# Patient Record
Sex: MALE | Race: WHITE | NOT HISPANIC OR LATINO | Employment: OTHER | URBAN - METROPOLITAN AREA
[De-identification: names, ages, dates, MRNs, and addresses within clinical notes are randomized per-mention and may not be internally consistent; named-entity substitution may affect disease eponyms.]

---

## 2017-01-12 ENCOUNTER — TELEPHONE (OUTPATIENT)
Dept: ORTHOPEDICS | Facility: CLINIC | Age: 77
End: 2017-01-12

## 2017-01-12 NOTE — TELEPHONE ENCOUNTER
Spoke with patient. Says he wanted to be seen today. Informed him that our providers are in surgery. Patient asked what he should do about his pain today. I informed him that he could go to the ER if he is in a lot of pain.

## 2017-01-12 NOTE — TELEPHONE ENCOUNTER
----- Message from Juice Corrigan sent at 1/12/2017 12:19 PM CST -----  Contact: pt  Pt is calling nurse staff regarding if patient can be seen today if possible. Pt is having pain with the left wrist. Pt call back asap [phone] thanks

## 2017-01-16 ENCOUNTER — OFFICE VISIT (OUTPATIENT)
Dept: ORTHOPEDICS | Facility: CLINIC | Age: 77
End: 2017-01-16
Payer: MEDICARE

## 2017-01-16 VITALS
HEART RATE: 66 BPM | SYSTOLIC BLOOD PRESSURE: 166 MMHG | HEIGHT: 73 IN | WEIGHT: 231.5 LBS | BODY MASS INDEX: 30.68 KG/M2 | DIASTOLIC BLOOD PRESSURE: 86 MMHG

## 2017-01-16 DIAGNOSIS — M19.032 ARTHRITIS OF LEFT WRIST: ICD-10-CM

## 2017-01-16 DIAGNOSIS — M77.8 TENDINITIS OF LEFT WRIST: Primary | ICD-10-CM

## 2017-01-16 PROCEDURE — 1160F RVW MEDS BY RX/DR IN RCRD: CPT | Mod: S$GLB,,, | Performed by: PHYSICIAN ASSISTANT

## 2017-01-16 PROCEDURE — 1159F MED LIST DOCD IN RCRD: CPT | Mod: S$GLB,,, | Performed by: PHYSICIAN ASSISTANT

## 2017-01-16 PROCEDURE — 1157F ADVNC CARE PLAN IN RCRD: CPT | Mod: S$GLB,,, | Performed by: PHYSICIAN ASSISTANT

## 2017-01-16 PROCEDURE — 20600 DRAIN/INJ JOINT/BURSA W/O US: CPT | Mod: LT,S$GLB,, | Performed by: PHYSICIAN ASSISTANT

## 2017-01-16 PROCEDURE — 1125F AMNT PAIN NOTED PAIN PRSNT: CPT | Mod: S$GLB,,, | Performed by: PHYSICIAN ASSISTANT

## 2017-01-16 PROCEDURE — 3079F DIAST BP 80-89 MM HG: CPT | Mod: S$GLB,,, | Performed by: PHYSICIAN ASSISTANT

## 2017-01-16 PROCEDURE — 99999 PR PBB SHADOW E&M-EST. PATIENT-LVL III: CPT | Mod: PBBFAC,,, | Performed by: PHYSICIAN ASSISTANT

## 2017-01-16 PROCEDURE — 99212 OFFICE O/P EST SF 10 MIN: CPT | Mod: 25,S$GLB,, | Performed by: PHYSICIAN ASSISTANT

## 2017-01-16 PROCEDURE — 3077F SYST BP >= 140 MM HG: CPT | Mod: S$GLB,,, | Performed by: PHYSICIAN ASSISTANT

## 2017-01-16 RX ORDER — DICLOFENAC SODIUM 10 MG/G
2 GEL TOPICAL 4 TIMES DAILY
Qty: 1 TUBE | Refills: 3 | Status: SHIPPED | OUTPATIENT
Start: 2017-01-16 | End: 2017-04-27

## 2017-01-16 RX ORDER — METHYLPREDNISOLONE ACETATE 40 MG/ML
40 INJECTION, SUSPENSION INTRA-ARTICULAR; INTRALESIONAL; INTRAMUSCULAR; SOFT TISSUE
Status: COMPLETED | OUTPATIENT
Start: 2017-01-16 | End: 2017-01-16

## 2017-01-16 RX ADMIN — METHYLPREDNISOLONE ACETATE 40 MG: 40 INJECTION, SUSPENSION INTRA-ARTICULAR; INTRALESIONAL; INTRAMUSCULAR; SOFT TISSUE at 03:01

## 2017-01-16 NOTE — MR AVS SNAPSHOT
[facility]  [address]  [place] [address]  Phone: [phone]  Fax: [phone]                  Pablito Givens Jr.   2017 2:45 PM   Office Visit    Description:  Male : 1940   Provider:  Dann Ozuna PA-C   Department:  [facility]           Reason for Visit     Left Wrist - Pain           Diagnoses this Visit        Comments    Tendinitis of left wrist    -  Primary     Arthritis of left wrist                To Do List           Future Appointments        Provider Department Dept Phone    2017 9:20 AM Jacinta Amezcua DPM [facility] Podiatry [phone]    3/20/2017 8:40 AM LABORATORY, [address] [facility] [phone]    3/27/2017 8:20 AM Christina East NP [facility] Internal Medicine [phone]    2017 9:20 AM Christiano Nieto MD [facility] Internal Medicine [phone]    1/3/2018 9:30 AM Josué Cook OD [facility] - Ophthalmology [phone]      Goals (5 Years of Data)     None      Follow-Up and Disposition     Return if symptoms worsen or fail to improve.       These Medications        Disp Refills Start End    diclofenac sodium 1 % Gel 1 Tube 3 2017    Apply 2 g topically 4 (four) times daily. - Topical    Pharmacy: [facility] [address] [place] [address] [id number] [place] [address] Ph #: [phone]         [facility] On [facility]     [facility] -  Assistance  Registered nurses in the [facility] provide clinical advisement, health education, appointment booking, and other advisory services.  Call for this free service at [phone].             Medications           Message regarding Medications     Verify the changes and/or additions to your medication regime listed below are the same as discussed with your clinician today.  If any of these changes or additions are incorrect, please notify your healthcare provider.        START taking these NEW medications      "   Refills    diclofenac sodium 1 % Gel 3    Sig: Apply 2 g topically 4 (four) times daily.    Class: Normal    Route: Topical      These medications were administered today        Dose Freq    methylPREDNISolone acetate injection 40 mg 40 mg Clinic/HOD 1 time    Sig: Inject 1 mL (40 mg total) into the articular space one time.    Class: Normal    Route: Intra-articular           Verify that the below list of medications is an accurate representation of the medications you are currently taking.  If none reported, the list may be blank. If incorrect, please contact your healthcare provider. Carry this list with you in case of emergency.           Current Medications     acetaminophen (TYLENOL) 650 MG TbSR Take 1 tablet (650 mg total) by mouth every 8 (eight) hours.    amitriptyline (ELAVIL) 25 MG tablet Take 1 tablet (25 mg total) by mouth every evening.    aspirin 81 MG Chew Take 81 mg by mouth once daily.    cholecalciferol, vitamin D3, (VITAMIN D3) 1,000 unit capsule Take 1 capsule (1,000 Units total) by mouth once daily.    colchicine 0.6 mg tablet TAKE ONE TABLET BY MOUTH ONCE DAILY    diclofenac sodium 1 % Gel Apply 2 g topically 4 (four) times daily.    hydrochlorothiazide (HYDRODIURIL) 25 MG tablet Take 1 tablet (25 mg total) by mouth once daily.    lancets (ACCU-CHEK SOFTCLIX LANCETS) Misc Check  blood sugars as soon as you wake up and 2 hrs after biggest meal    lisinopril (PRINIVIL,ZESTRIL) 20 MG tablet Take 1 tablet (20 mg total) by mouth once daily.    meloxicam (MOBIC) 15 MG tablet Take 1 tablet (15 mg total) by mouth once daily.    zolpidem (AMBIEN) 5 MG Tab Take 1 tablet (5 mg total) by mouth every evening.           Clinical Reference Information           Vital Signs - Last Recorded  Most recent update: 1/16/2017  3:09 PM by Hayde Wynn MA    BP Pulse Ht Wt BMI    (!) 166/86 66 6' 1" (1.854 m) 105 kg (231 lb 7.7 oz) 30.54 kg/m2      Blood Pressure          Most Recent Value    BP  (!)  " 166/86      Allergies as of 1/16/2017     Amlodipine    Demerol [Meperidine]    Oxycodone    Darvocet A500  [Propoxyphene N-acetaminophen]    Opioids-meperidine And Related    Codeine      Immunizations Administered on Date of Encounter - 1/16/2017     None

## 2017-01-16 NOTE — PROGRESS NOTES
Subjective:      Patient ID: Pablito Givens Jr. is a 76 y.o. male.    Chief Complaint: Pain of the Left Wrist    HPI   The patient presents to clinic for follow-up of left wrist pain. The patient states that he has had a gradual increase in his pain. He had a steroid injection in this wrist several months ago (July 2016) and reports good relief from this. He requests another injection today.     Review of Systems   Constitution: Negative for chills and fever.   Gastrointestinal: Negative for abdominal pain, diarrhea, nausea and vomiting.         Objective:                    Left Hand/Wrist Exam     Pain   Wrist - The patient exhibits pain of the extensory musculature.    Swelling   Wrist - The patient is swollen on the extensory musculature.    Range of Motion     Wrist   Extension: normal   Flexion: normal   Pronation: normal   Supination: normal            On exam, the patient has tenderness and pain of articulating surface of distal radius and ulna and ulna styloid.     X-rays: none taken today.        Assessment:       Encounter Diagnoses   Name Primary?    Tendinitis of left wrist Yes    Arthritis of left wrist           Plan:       Pablito was seen today for pain.    Diagnoses and all orders for this visit:    Tendinitis of left wrist  -     diclofenac sodium 1 % Gel; Apply 2 g topically 4 (four) times daily.    Arthritis of left wrist  -     diclofenac sodium 1 % Gel; Apply 2 g topically 4 (four) times daily.    Other orders  -     methylPREDNISolone acetate injection 40 mg; Inject 1 mL (40 mg total) into the articular space one time.        The patient was given a depomedrol and xylocaine injection for left wrist discomfort. He understands that he can have a max of 4 injections of his wrist per calendar year. This will be his second injection.     He was instructed on the use of warm, damp compresses and taking an anti-inflammatory. The patient does not want to take oral NSAIDs. Therefor, I prescribed  voltaren gel today.     He will follow-up as needed. in the clinic if he has any concerns or increased pain.    Procedure note:    Preoperative diagnosis: Arthritis and tendinitis of left wrist  Postoperative diagnosis: same    Procedure performed Injection  left wrist(s)     Description:  Timeout was performed and the patient's allergies were verified. His left wrist ( distal ulna) was marked and prepped with betadine prior to procedure. Under sterile conditions the patient's left wrist(s) was injected through   anterolateral approach with Depo-Medrol 40 and 0.5 cc of 2% Xylocaine.  The patient tolerated the procedure without incident.  A Band-Aid was applied to the needle site.

## 2017-01-17 ENCOUNTER — TELEPHONE (OUTPATIENT)
Dept: ORTHOPEDICS | Facility: CLINIC | Age: 77
End: 2017-01-17

## 2017-01-17 NOTE — TELEPHONE ENCOUNTER
----- Message from Dina Argueta sent at 1/17/2017 10:29 AM CST -----  Pt at [phone]///states he saw Ms Ozuna yesterday//1-16/17///Was given an topical ointment//the pharmacy did not have this med in stock  and also it is very expensive//$65.00//is needing a different med//less expensive//please call to discuss//thanks/lh

## 2017-01-17 NOTE — TELEPHONE ENCOUNTER
Spoke with patient and let him know that Julian Ozuna said he go go to the store and get a cream called blue-emu for his muscle pain. The patient stated that he will get some and try it.

## 2017-01-17 NOTE — TELEPHONE ENCOUNTER
Spoke with patient and let him know that I have forwarded his message to Dann Ozuna for review and will give him a call back as soon as I hear back from her.

## 2017-01-17 NOTE — TELEPHONE ENCOUNTER
----- Message from Dann Ozuna PA-C sent at 1/17/2017 12:07 PM CST -----  Hi!    If his pharmacy won't cover it, I do not have a cheaper alternative. There are compound creams which can be pretty pricey too.     Please inform the patient of this. He can also purchase Blue-Emu cream at most stores. It's very popular for muscle and joint pain.     Dann Ozuna PA-C    ----- Message -----     From: Lisandra Moran MA     Sent: 1/17/2017  11:28 AM       To: Dann Ozuna PA-C        ----- Message -----     From: Dina Argueta     Sent: 1/17/2017  10:29 AM       To: Laith GONZALEZ Staff    Pt at [phone]///states he saw Ms Ozuna yesterday//1-16/17///Was given an topical ointment//the pharmacy did not have this med in stock  and also it is very expensive//$65.00//is needing a different med//less expensive//please call to discuss//thanks/lh

## 2017-01-23 ENCOUNTER — TELEPHONE (OUTPATIENT)
Dept: INTERNAL MEDICINE | Facility: CLINIC | Age: 77
End: 2017-01-23

## 2017-01-23 RX ORDER — COLCHICINE 0.6 MG/1
0.6 TABLET ORAL DAILY
Qty: 30 TABLET | Refills: 11 | Status: SHIPPED | OUTPATIENT
Start: 2017-01-23 | End: 2017-02-17 | Stop reason: SDUPTHER

## 2017-01-23 NOTE — TELEPHONE ENCOUNTER
S/w insurance. Pt's colchicine is a non formulary medication and a form needs to be completed for pt to have a non Tier 1 exception. Waiting on form.

## 2017-01-23 NOTE — TELEPHONE ENCOUNTER
----- Message from Alexandria Odom sent at 1/23/2017  3:22 PM CST -----  Contact: pt   States he's been taking his medicine for gout / cochison(sp) and has been taking it for years and ws told recently that there was a price increase due to insurance not paying but was told to get a PA to state that pt needs the medicine and can be reached at [phone]/ states his right foot is starting to hurt bad [phone]//thanks/dbw    States they were filled by Dr Oquendo but has been seeing Dr Nieto lately     Pt pharm is   [id number] - [place], [place] - [address] [address]  [address] [place] [address]  [place] [address]  Phone: [phone] Fax: [phone]

## 2017-01-23 NOTE — TELEPHONE ENCOUNTER
MD Gerson Bustillos Staff 3 minutes ago (5:01 PM)                 I tried to send a new order for Colcrys brand name which is [employer] covered formulary and a review should not be required.   Also Dr Oquendo is his PCP. Needs o stick with her for non acute care problems

## 2017-02-13 ENCOUNTER — HOSPITAL ENCOUNTER (OUTPATIENT)
Dept: RADIOLOGY | Facility: HOSPITAL | Age: 77
Discharge: HOME | End: 2017-02-13
Attending: PHYSICIAN ASSISTANT
Payer: MEDICARE

## 2017-02-13 ENCOUNTER — OFFICE VISIT (OUTPATIENT)
Dept: ORTHOPEDICS | Facility: CLINIC | Age: 77
End: 2017-02-13
Payer: MEDICARE

## 2017-02-13 VITALS
HEIGHT: 73 IN | WEIGHT: 231.5 LBS | DIASTOLIC BLOOD PRESSURE: 83 MMHG | BODY MASS INDEX: 30.68 KG/M2 | HEART RATE: 94 BPM | SYSTOLIC BLOOD PRESSURE: 116 MMHG

## 2017-02-13 DIAGNOSIS — M25.421 PAIN AND SWELLING OF ELBOW, RIGHT: ICD-10-CM

## 2017-02-13 DIAGNOSIS — M25.521 RIGHT ELBOW PAIN: ICD-10-CM

## 2017-02-13 DIAGNOSIS — M25.521 PAIN AND SWELLING OF ELBOW, RIGHT: ICD-10-CM

## 2017-02-13 DIAGNOSIS — M10.021 ACUTE IDIOPATHIC GOUT OF RIGHT ELBOW: Primary | ICD-10-CM

## 2017-02-13 DIAGNOSIS — M25.521 RIGHT ELBOW PAIN: Primary | ICD-10-CM

## 2017-02-13 DIAGNOSIS — M19.021 PRIMARY OSTEOARTHRITIS OF RIGHT ELBOW: ICD-10-CM

## 2017-02-13 PROCEDURE — 1159F MED LIST DOCD IN RCRD: CPT | Mod: S$GLB,,, | Performed by: PHYSICIAN ASSISTANT

## 2017-02-13 PROCEDURE — 3074F SYST BP LT 130 MM HG: CPT | Mod: S$GLB,,, | Performed by: PHYSICIAN ASSISTANT

## 2017-02-13 PROCEDURE — 20605 DRAIN/INJ JOINT/BURSA W/O US: CPT | Mod: RT,S$GLB,, | Performed by: PHYSICIAN ASSISTANT

## 2017-02-13 PROCEDURE — 73080 X-RAY EXAM OF ELBOW: CPT | Mod: 26,RT,, | Performed by: RADIOLOGY

## 2017-02-13 PROCEDURE — 99999 PR PBB SHADOW E&M-EST. PATIENT-LVL III: CPT | Mod: PBBFAC,,, | Performed by: PHYSICIAN ASSISTANT

## 2017-02-13 PROCEDURE — 99213 OFFICE O/P EST LOW 20 MIN: CPT | Mod: 25,S$GLB,, | Performed by: PHYSICIAN ASSISTANT

## 2017-02-13 PROCEDURE — 3079F DIAST BP 80-89 MM HG: CPT | Mod: S$GLB,,, | Performed by: PHYSICIAN ASSISTANT

## 2017-02-13 PROCEDURE — 1160F RVW MEDS BY RX/DR IN RCRD: CPT | Mod: S$GLB,,, | Performed by: PHYSICIAN ASSISTANT

## 2017-02-13 PROCEDURE — 1125F AMNT PAIN NOTED PAIN PRSNT: CPT | Mod: S$GLB,,, | Performed by: PHYSICIAN ASSISTANT

## 2017-02-13 PROCEDURE — 1157F ADVNC CARE PLAN IN RCRD: CPT | Mod: S$GLB,,, | Performed by: PHYSICIAN ASSISTANT

## 2017-02-13 RX ORDER — METHYLPREDNISOLONE ACETATE 40 MG/ML
40 INJECTION, SUSPENSION INTRA-ARTICULAR; INTRALESIONAL; INTRAMUSCULAR; SOFT TISSUE
Status: COMPLETED | OUTPATIENT
Start: 2017-02-13 | End: 2017-02-13

## 2017-02-13 RX ADMIN — METHYLPREDNISOLONE ACETATE 40 MG: 40 INJECTION, SUSPENSION INTRA-ARTICULAR; INTRALESIONAL; INTRAMUSCULAR; SOFT TISSUE at 09:02

## 2017-02-13 NOTE — PROGRESS NOTES
Subjective:      Patient ID: Pablito Givens Jr. is a 76 y.o. male.    Chief Complaint: Pain of the Right Elbow    HPI  The patient presents to clinic for evaluation of right elbow pain and swelling.  He states that he did not injure himself or bump it.  The only thing that he's been doing is driving his tractor.  He does have a history of gout.  The patient states that his symptoms have been ongoing for approximately one week now.  Rates his current pain a 10 out of 10.  X-rays were taken today.  The patient states that he hasn't seen a rheumatologist in years.  He is unable to recall who he did see.    Review of Systems   Constitution: Negative for chills and fever.   Gastrointestinal: Negative for abdominal pain, diarrhea, nausea and vomiting.         Objective:            Ortho/SPM Exam   On exam, the patient has mild swelling and soft tissue edema over posterior arm and surrounding elbow.  He has pronounced pain to palpation over lateral and medial epicondyles as well as radial head.  The patient has limited flexion and extension due to his pain.    X-rays:Technique: 3 views were obtained of the right elbow    Comparison: 06/28/2016    Findings: There is nonspecific soft tissue edema surrounding the elbow.  No acute fractures or dislocations visualized.  Prominence of the anterior fat pad suggest possible small elbow joint effusion.  Previously described calcifications near the radial head are no longer visualized Moderate degenerative findings at the elbow again noted and unchanged.        Assessment:       Encounter Diagnoses   Name Primary?    Pain and swelling of elbow, right     Primary osteoarthritis of right elbow     Acute idiopathic gout of right elbow Yes          Plan:       Pablito was seen today for pain.    Diagnoses and all orders for this visit:    Acute idiopathic gout of right elbow    Pain and swelling of elbow, right  -     MARIA E; Future  -     Rheumatoid factor; Future  -     Sedimentation  rate, manual; Future  -     Uric acid; Future  -     C-reactive protein; Future  -     CBC auto differential; Future  -     methylPREDNISolone acetate injection 40 mg; Inject 1 mL (40 mg total) into the articular space one time.  -     methylPREDNISolone acetate injection 40 mg; Inject 1 mL (40 mg total) into the articular space one time.    Primary osteoarthritis of right elbow  -     MARIA E; Future  -     Rheumatoid factor; Future  -     Sedimentation rate, manual; Future  -     Uric acid; Future  -     C-reactive protein; Future  -     CBC auto differential; Future  -     methylPREDNISolone acetate injection 40 mg; Inject 1 mL (40 mg total) into the articular space one time.  -     methylPREDNISolone acetate injection 40 mg; Inject 1 mL (40 mg total) into the articular space one time.      The patient was given 2 injections in the distribution of lateral and medial epicondyles.  Afterwards, I applied a 4 inch Ace wrap on his elbow.  He was cautioned not to bump or lean on this elbow.  I would like the patient to have lab studies on his way out of clinic today.  He was instructed to apply cold compresses to his elbow as needed throughout the day.  The patient will return in approximately 3-4 weeks for follow-up.  If there are any significant abnormalities in his lab studies, I will notify him via phone call in 1-2 days.    Addendum: I notified the patient about his lab results. He will increase his colchicine to BID. He voiced understanding of these instructions.       Procedure note:    Preoperative diagnosis: Osteoarthritis of right elbow, gout flareup, pain and swelling of elbow  Postoperative diagnosis: same    Procedure performed Injection  right elbow(s)     Description:  Timeout was performed and the patient's ALLERGIES were verified.  His right elbow was marked and prepped with Betadine prior to the injections.  Under sterile conditions the patient's right elbow(s) was injected through   anteromedial,  anterolateral approach with Depo-Medrol 40 and 0.5 cc of 2% Xylocaine.  The patient tolerated the procedure without incident.  A Band-Aid was applied to the needle site.

## 2017-02-13 NOTE — MR AVS SNAPSHOT
[facility]  [address] [address]  Phone: [phone]  Fax: [phone]                  Pablito Givens Jr.   2017 9:15 AM   Office Visit    Description:  Male : 1940   Provider:  Dann Ozuna PA-C   [facility]           Reason for Visit     Right Elbow - Pain           Diagnoses this Visit        Comments    Pain and swelling of elbow, right    -  Primary     Primary osteoarthritis of right elbow                To Do List           Future Appointments        Provider Department Dept Phone    2017 2:10 PM LABORATORY, [address] [facility] [phone]    3/13/2017 9:15 AM Dann Ozuna PA-C [facility] [phone]    3/20/2017 8:40 AM LABORATORY, [address] [facility] [phone]    3/27/2017 8:20 AM Christina East NP [facility] Internal Medicine [phone]    2017 9:20 AM Christiano Nieto MD [facility] Internal Medicine [phone]      Goals (5 Years of Data)     None      [facility]     [facility] Nurse Care Line -  Assistance  Registered nurses in the [facility] provide clinical advisement, health education, appointment booking, and other advisory services.  Call for this free service at [phone].             Medications           Message regarding Medications     Verify the changes and/or additions to your medication regime listed below are the same as discussed with your clinician today.  If any of these changes or additions are incorrect, please notify your healthcare provider.        These medications were administered today        Dose Freq    methylPREDNISolone acetate injection 40 mg 40 mg Clinic/HOD 1 time    Sig: Inject 1 mL (40 mg total) into the articular space one time.    Class: Normal    Route: Intra-articular    methylPREDNISolone acetate injection 40 mg 40 mg Clinic/HOD 1 time    Sig: Inject 1 mL (40 mg  "total) into the articular space one time.    Class: Normal    Route: Intra-articular           Verify that the below list of medications is an accurate representation of the medications you are currently taking.  If none reported, the list may be blank. If incorrect, please contact your healthcare provider. Carry this list with you in case of emergency.           Current Medications     acetaminophen (TYLENOL) 650 MG TbSR Take 1 tablet (650 mg total) by mouth every 8 (eight) hours.    amitriptyline (ELAVIL) 25 MG tablet Take 1 tablet (25 mg total) by mouth every evening.    aspirin 81 MG Chew Take 81 mg by mouth once daily.    cholecalciferol, vitamin D3, (VITAMIN D3) 1,000 unit capsule Take 1 capsule (1,000 Units total) by mouth once daily.    colchicine 0.6 mg tablet Take 1 tablet (0.6 mg total) by mouth once daily.    hydrochlorothiazide (HYDRODIURIL) 25 MG tablet Take 1 tablet (25 mg total) by mouth once daily.    lancets (ACCU-CHEK SOFTCLIX LANCETS) Misc Check  blood sugars as soon as you wake up and 2 hrs after biggest meal    lisinopril (PRINIVIL,ZESTRIL) 20 MG tablet Take 1 tablet (20 mg total) by mouth once daily.    meloxicam (MOBIC) 15 MG tablet Take 1 tablet (15 mg total) by mouth once daily.    zolpidem (AMBIEN) 5 MG Tab Take 1 tablet (5 mg total) by mouth every evening.    diclofenac sodium 1 % Gel Apply 2 g topically 4 (four) times daily.           Clinical Reference Information           Your Vitals Were     BP Pulse Height Weight BMI    116/83 94 6' 1" (1.854 m) 105 kg (231 lb 7.7 oz) 30.54 kg/m2      Blood Pressure          Most Recent Value    BP  116/83      Allergies as of 2/13/2017     Amlodipine    Demerol [Meperidine]    Oxycodone    Darvocet A500  [Propoxyphene N-acetaminophen]    Opioids-meperidine And Related    Codeine      Immunizations Administered on Date of Encounter - 2/13/2017     None      Orders Placed During Today's Visit     Future Labs/Procedures Expected by Expires    MARIA E  " 2/13/2017 4/14/2018    C-reactive protein  2/13/2017 4/14/2018    CBC auto differential  2/13/2017 4/14/2018    Rheumatoid factor  2/13/2017 4/14/2018    Sedimentation rate, manual  2/13/2017 4/14/2018    Uric acid  2/13/2017 4/14/2018      Language Assistance Services     ATTENTION: Language assistance services are available, free of charge. Please call [phone].      ATENCIÓN: Si habla español, tiene a han disposición servicios gratuitos de asistencia lingüística. Llame al [phone].     CHÚ Ý: N?u b?n nói Ti?ng Vi?t, có các d?ch v? h? tr? ngôn ng? mi?n phí dành cho b?n. G?i s? [phone].         [facility] complies with applicable Federal civil rights laws and does not discriminate on the basis of race, color, national origin, age, disability, or sex.

## 2017-02-15 ENCOUNTER — TELEPHONE (OUTPATIENT)
Dept: ORTHOPEDICS | Facility: CLINIC | Age: 77
End: 2017-02-15

## 2017-02-15 NOTE — TELEPHONE ENCOUNTER
----- Message from Soy Horton sent at 2/15/2017  9:41 AM CST -----  Pt is requesting a call from nurse to discuss his prescription.              Please call pt back at [phone]

## 2017-02-17 ENCOUNTER — TELEPHONE (OUTPATIENT)
Dept: ORTHOPEDICS | Facility: CLINIC | Age: 77
End: 2017-02-17

## 2017-02-17 RX ORDER — COLCHICINE 0.6 MG/1
0.6 TABLET ORAL 2 TIMES DAILY
Qty: 60 TABLET | Refills: 1 | Status: SHIPPED | OUTPATIENT
Start: 2017-02-17 | End: 2017-02-17 | Stop reason: SDUPTHER

## 2017-02-17 RX ORDER — COLCHICINE 0.6 MG/1
0.6 TABLET ORAL 2 TIMES DAILY
Qty: 60 TABLET | Refills: 1 | Status: SHIPPED | OUTPATIENT
Start: 2017-02-17 | End: 2017-04-27

## 2017-02-17 NOTE — TELEPHONE ENCOUNTER
Spoke with patient regarding his pharmacy not having his medication in stock.I got a new pharmacy and let him know that [facility] will resend it .

## 2017-02-17 NOTE — TELEPHONE ENCOUNTER
----- Message from Dann Ozuna PA-C sent at 2/17/2017  1:29 PM CST -----  Contact: Pt   Can you please call the patient and let him know that I prescribed his medication?    Dann Yeung       ----- Message -----     From: Lisandra Moran MA     Sent: 2/16/2017   2:05 PM       To: Dann Ozuna PA-C     Just a reminder to fill medication. Thanks.   ----- Message -----     From: Mariel Mathew     Sent: 2/16/2017   1:50 PM       To: Laith GONZALEZ Staff    Pt states he spoke with the nurse last night and is waiting on a call from the nurse./ Pt can be reached at [phone]

## 2017-03-10 ENCOUNTER — PATIENT OUTREACH (OUTPATIENT)
Dept: ADMINISTRATIVE | Facility: HOSPITAL | Age: 77
End: 2017-03-10

## 2017-03-10 NOTE — PROGRESS NOTES
Diabetic eye exam has been completed and [facility] to [facility] for [facility]-Eye Exam. Measure has been satisfied

## 2017-03-13 ENCOUNTER — OFFICE VISIT (OUTPATIENT)
Dept: ORTHOPEDICS | Facility: CLINIC | Age: 77
End: 2017-03-13
Payer: MEDICARE

## 2017-03-13 VITALS
SYSTOLIC BLOOD PRESSURE: 113 MMHG | DIASTOLIC BLOOD PRESSURE: 72 MMHG | HEIGHT: 73 IN | HEART RATE: 89 BPM | BODY MASS INDEX: 30.51 KG/M2 | WEIGHT: 230.19 LBS

## 2017-03-13 DIAGNOSIS — M77.11 RIGHT TENNIS ELBOW: Primary | ICD-10-CM

## 2017-03-13 DIAGNOSIS — M10.021 ACUTE IDIOPATHIC GOUT OF RIGHT ELBOW: ICD-10-CM

## 2017-03-13 DIAGNOSIS — M19.021 PRIMARY OSTEOARTHRITIS OF RIGHT ELBOW: ICD-10-CM

## 2017-03-13 PROCEDURE — 1160F RVW MEDS BY RX/DR IN RCRD: CPT | Mod: S$GLB,,, | Performed by: PHYSICIAN ASSISTANT

## 2017-03-13 PROCEDURE — 3078F DIAST BP <80 MM HG: CPT | Mod: S$GLB,,, | Performed by: PHYSICIAN ASSISTANT

## 2017-03-13 PROCEDURE — 99212 OFFICE O/P EST SF 10 MIN: CPT | Mod: S$GLB,,, | Performed by: PHYSICIAN ASSISTANT

## 2017-03-13 PROCEDURE — 99999 PR PBB SHADOW E&M-EST. PATIENT-LVL III: CPT | Mod: PBBFAC,,, | Performed by: PHYSICIAN ASSISTANT

## 2017-03-13 PROCEDURE — 1157F ADVNC CARE PLAN IN RCRD: CPT | Mod: S$GLB,,, | Performed by: PHYSICIAN ASSISTANT

## 2017-03-13 PROCEDURE — 1125F AMNT PAIN NOTED PAIN PRSNT: CPT | Mod: S$GLB,,, | Performed by: PHYSICIAN ASSISTANT

## 2017-03-13 PROCEDURE — 3074F SYST BP LT 130 MM HG: CPT | Mod: S$GLB,,, | Performed by: PHYSICIAN ASSISTANT

## 2017-03-13 PROCEDURE — 1159F MED LIST DOCD IN RCRD: CPT | Mod: S$GLB,,, | Performed by: PHYSICIAN ASSISTANT

## 2017-03-13 NOTE — MR AVS SNAPSHOT
[facility]  [address] [facility] [address]  Phone: [phone]  Fax: [phone]                  Pablito Givens Jr.   3/13/2017 9:15 AM   Office Visit    Description:  Male : 1940   Provider:  Dann Ozuna PA-C   Department:  [facility]           Reason for Visit     Right Elbow - Pain           Diagnoses this Visit        Comments    Right tennis elbow    -  Primary     Acute idiopathic gout of right elbow         Primary osteoarthritis of right elbow                To Do List           Future Appointments        Provider Department Dept Phone    3/20/2017 8:40 AM LABORATORY, [address] [facility] [phone]    2017 10:30 AM Larry Zepeda MD [facility] - Rheumatology [phone]    2017 9:20 AM Christiano Nieto MD [facility] - Internal Medicine [phone]    1/3/2018 9:30 AM Josué Cook OD [facility] Ophthalmology [phone]      Goals (5 Years of Data)     None      [facility]     [facility] -  Assistance  Registered nurses in the [facility] provide clinical advisement, health education, appointment booking, and other advisory services.  Call for this free service at [phone].             Medications           Message regarding Medications     Verify the changes and/or additions to your medication regime listed below are the same as discussed with your clinician today.  If any of these changes or additions are incorrect, please notify your healthcare provider.             Verify that the below list of medications is an accurate representation of the medications you are currently taking.  If none reported, the list may be blank. If incorrect, please contact your healthcare provider. Carry this list with you in case of emergency.           Current Medications     acetaminophen (TYLENOL) 650 MG TbSR Take 1 tablet (650 mg total) by mouth every 8 (eight) hours.     "amitriptyline (ELAVIL) 25 MG tablet Take 1 tablet (25 mg total) by mouth every evening.    aspirin 81 MG Chew Take 81 mg by mouth once daily.    cholecalciferol, vitamin D3, (VITAMIN D3) 1,000 unit capsule Take 1 capsule (1,000 Units total) by mouth once daily.    colchicine 0.6 mg tablet Take 1 tablet (0.6 mg total) by mouth 2 (two) times daily.    hydrochlorothiazide (HYDRODIURIL) 25 MG tablet Take 1 tablet (25 mg total) by mouth once daily.    lancets (ACCU-CHEK SOFTCLIX LANCETS) Misc Check  blood sugars as soon as you wake up and 2 hrs after biggest meal    lisinopril (PRINIVIL,ZESTRIL) 20 MG tablet Take 1 tablet (20 mg total) by mouth once daily.    meloxicam (MOBIC) 15 MG tablet Take 1 tablet (15 mg total) by mouth once daily.    zolpidem (AMBIEN) 5 MG Tab Take 1 tablet (5 mg total) by mouth every evening.    diclofenac sodium 1 % Gel Apply 2 g topically 4 (four) times daily.           Clinical Reference Information           Your Vitals Were     BP Pulse Height Weight BMI    113/72 89 6' 1" (1.854 m) 104.4 kg (230 lb 2.6 oz) 30.37 kg/m2      Blood Pressure          Most Recent Value    BP  113/72      Allergies as of 3/13/2017     Amlodipine    Demerol [Meperidine]    Oxycodone    Darvocet A500  [Propoxyphene N-acetaminophen]    Opioids-meperidine And Related    Codeine      Immunizations Administered on Date of Encounter - 3/13/2017     None      Orders Placed During Today's Visit      Normal Orders This Visit    Ambulatory Referral to Rheumatology       Language Assistance Services     ATTENTION: Language assistance services are available, free of charge. Please call [phone].      ATENCIÓN: Si habla caesar, tiene a han disposición servicios gratuitos de asistencia lingüística. Llame al [phone].     CHÚ Ý: N?u b?n nói Ti?ng Vi?t, có các d?ch v? h? tr? ngôn ng? mi?n phí dành cho b?n. G?i s? [phone].         [facility] complies with applicable Federal civil rights laws and does " not discriminate on the basis of race, color, national origin, age, disability, or sex.

## 2017-03-13 NOTE — PROGRESS NOTES
Subjective:      Patient ID: Pablito Givens Jr. is a 76 y.o. male.    Chief Complaint: Pain of the Right Elbow    HPI  The patient presents to clinic for follow-up of right elbow pain and swelling. Overall, his symptoms have improved greatly since his last visit. The patient states that he was unable to get colcrys BID from his pharmacy due to his insurance and out-of-pocket costs. He started taking the colcrys that he had at home BID for a few days (3-4 days) and then resumed it once daily.     Labs from his last visit were reviewed with the patient.     Review of Systems   Constitution: Negative for chills and fever.   Gastrointestinal: Negative for abdominal pain, diarrhea, nausea and vomiting.         Objective:                        Right Hand/Wrist Exam     Range of Motion   The patient has normal right hand/wrist ROM.      Right Elbow Exam     Inspection   Effusion: absent    Pain   The patient exhibits pain of the extensor musculature and lateral epicondyle    Swelling   The patient is swollen on the flexor pronator musculature and lateral epicondyle    Tenderness   The patient is tender to palpation of the lateral epicondyle.     Range of Motion   The patient has normal right elbow ROM.    Tests   Tennis Elbow: mild            X-rays:  None taken today.    Labs:       Normal Range:    Current:   Uric Acid 3.4 - 7.0 mg/dL 10.4 (H             Assessment:       Encounter Diagnoses   Name Primary?    Right tennis elbow Yes    Acute idiopathic gout of right elbow     Primary osteoarthritis of right elbow           Plan:       Pablito was seen today for pain.    Diagnoses and all orders for this visit:    Right tennis elbow  -     Ambulatory Referral to Rheumatology    Acute idiopathic gout of right elbow  -     Ambulatory Referral to Rheumatology    Primary osteoarthritis of right elbow  -     Ambulatory Referral to Rheumatology      The patient seems to be improving with his pain and swelling after a recent  Depo-Medrol and Xylocaine injection of his elbow and with colchicine/colcrys.  I would like the patient to have an appointment with rheumatology for a consultation given  recent elevation of uric acid levels.  The patient would like to follow up with me after that  appointment should he have any increased pain, swelling or other concerns.

## 2017-03-20 ENCOUNTER — LAB VISIT (OUTPATIENT)
Dept: LAB | Facility: HOSPITAL | Age: 77
End: 2017-03-20
Attending: FAMILY MEDICINE
Payer: MEDICARE

## 2017-03-20 DIAGNOSIS — E55.9 VITAMIN D DEFICIENCY: ICD-10-CM

## 2017-03-20 DIAGNOSIS — I10 ESSENTIAL HYPERTENSION: ICD-10-CM

## 2017-03-20 DIAGNOSIS — R73.03 PREDIABETES: ICD-10-CM

## 2017-03-20 LAB
25(OH)D3+25(OH)D2 SERPL-MCNC: 21 NG/ML
ANION GAP SERPL CALC-SCNC: 13 MMOL/L
BUN SERPL-MCNC: 26 MG/DL
CALCIUM SERPL-MCNC: 9.9 MG/DL
CHLORIDE SERPL-SCNC: 104 MMOL/L
CHOLEST/HDLC SERPL: 4.5 {RATIO}
CO2 SERPL-SCNC: 26 MMOL/L
CREAT SERPL-MCNC: 1.5 MG/DL
EST. GFR  (AFRICAN AMERICAN): 52.3 ML/MIN/1.73 M^2
EST. GFR  (NON AFRICAN AMERICAN): 45.2 ML/MIN/1.73 M^2
GLUCOSE SERPL-MCNC: 85 MG/DL
HDL/CHOLESTEROL RATIO: 22.1 %
HDLC SERPL-MCNC: 163 MG/DL
HDLC SERPL-MCNC: 36 MG/DL
LDLC SERPL CALC-MCNC: 98.8 MG/DL
NONHDLC SERPL-MCNC: 127 MG/DL
POTASSIUM SERPL-SCNC: 4 MMOL/L
SODIUM SERPL-SCNC: 143 MMOL/L
TRIGL SERPL-MCNC: 141 MG/DL
TSH SERPL DL<=0.005 MIU/L-ACNC: 1.61 UIU/ML

## 2017-03-20 PROCEDURE — 80061 LIPID PANEL: CPT

## 2017-03-20 PROCEDURE — 82306 VITAMIN D 25 HYDROXY: CPT

## 2017-03-20 PROCEDURE — 84443 ASSAY THYROID STIM HORMONE: CPT

## 2017-03-20 PROCEDURE — 83036 HEMOGLOBIN GLYCOSYLATED A1C: CPT

## 2017-03-20 PROCEDURE — 80048 BASIC METABOLIC PNL TOTAL CA: CPT

## 2017-03-20 PROCEDURE — 36415 COLL VENOUS BLD VENIPUNCTURE: CPT

## 2017-03-21 LAB
ESTIMATED AVG GLUCOSE: 128 MG/DL
HBA1C MFR BLD HPLC: 6.1 %

## 2017-03-24 ENCOUNTER — TELEPHONE (OUTPATIENT)
Dept: INTERNAL MEDICINE | Facility: CLINIC | Age: 77
End: 2017-03-24

## 2017-03-24 DIAGNOSIS — E55.9 VITAMIN D INSUFFICIENCY: Primary | ICD-10-CM

## 2017-03-24 RX ORDER — ERGOCALCIFEROL 1.25 MG/1
50000 CAPSULE ORAL
Qty: 12 CAPSULE | Refills: 0 | Status: SHIPPED | OUTPATIENT
Start: 2017-03-24 | End: 2017-06-08 | Stop reason: SDUPTHER

## 2017-03-24 NOTE — TELEPHONE ENCOUNTER
----- Message from Christiano Nieto MD sent at 3/22/2017 11:36 AM CDT -----  A1c remains in prediabetic range.  As long as maintains physical activity level, okay to stay off low-dose metformin.  If decreased physical activity level, needs to start low dose metformin.  Return to clinic as planned.

## 2017-03-24 NOTE — TELEPHONE ENCOUNTER
----- Message from Christiano Nieto MD sent at 3/21/2017  8:12 AM CDT -----  Thyroid testing is normal.  Vitamin D Level is low despite OTC supplement. Needs weekly high dose supplement for 3 months. Recheck levlel in 3 months.  Lipid panel stable  Renal disease stable. Keep all Urology and nephrology appointments.  A1c pending

## 2017-03-28 DIAGNOSIS — F51.04 CHRONIC INSOMNIA: ICD-10-CM

## 2017-03-28 RX ORDER — ZOLPIDEM TARTRATE 5 MG/1
TABLET ORAL
Qty: 90 TABLET | Refills: 0 | Status: SHIPPED | OUTPATIENT
Start: 2017-03-28 | End: 2017-06-19 | Stop reason: SDUPTHER

## 2017-04-27 ENCOUNTER — OFFICE VISIT (OUTPATIENT)
Dept: INTERNAL MEDICINE | Facility: CLINIC | Age: 77
End: 2017-04-27
Payer: MEDICARE

## 2017-04-27 ENCOUNTER — NURSE TRIAGE (OUTPATIENT)
Dept: ADMINISTRATIVE | Facility: CLINIC | Age: 77
End: 2017-04-27

## 2017-04-27 VITALS
BODY MASS INDEX: 30.33 KG/M2 | TEMPERATURE: 98 F | SYSTOLIC BLOOD PRESSURE: 122 MMHG | HEIGHT: 73 IN | DIASTOLIC BLOOD PRESSURE: 72 MMHG | WEIGHT: 228.81 LBS

## 2017-04-27 DIAGNOSIS — Z90.5 HISTORY OF NEPHRECTOMY, UNILATERAL: ICD-10-CM

## 2017-04-27 DIAGNOSIS — N18.30 STAGE 3 CHRONIC KIDNEY DISEASE: ICD-10-CM

## 2017-04-27 DIAGNOSIS — R73.03 PREDIABETES: Chronic | ICD-10-CM

## 2017-04-27 DIAGNOSIS — E55.9 VITAMIN D DEFICIENCY DISEASE: Chronic | ICD-10-CM

## 2017-04-27 DIAGNOSIS — M1A.09X1 IDIOPATHIC CHRONIC GOUT OF MULTIPLE SITES WITH TOPHUS: Primary | Chronic | ICD-10-CM

## 2017-04-27 PROCEDURE — 1157F ADVNC CARE PLAN IN RCRD: CPT | Mod: S$GLB,,, | Performed by: FAMILY MEDICINE

## 2017-04-27 PROCEDURE — 1125F AMNT PAIN NOTED PAIN PRSNT: CPT | Mod: S$GLB,,, | Performed by: FAMILY MEDICINE

## 2017-04-27 PROCEDURE — 99999 PR PBB SHADOW E&M-EST. PATIENT-LVL III: CPT | Mod: PBBFAC,,, | Performed by: FAMILY MEDICINE

## 2017-04-27 PROCEDURE — 3074F SYST BP LT 130 MM HG: CPT | Mod: S$GLB,,, | Performed by: FAMILY MEDICINE

## 2017-04-27 PROCEDURE — 3078F DIAST BP <80 MM HG: CPT | Mod: S$GLB,,, | Performed by: FAMILY MEDICINE

## 2017-04-27 PROCEDURE — 99214 OFFICE O/P EST MOD 30 MIN: CPT | Mod: S$GLB,,, | Performed by: FAMILY MEDICINE

## 2017-04-27 PROCEDURE — 99499 UNLISTED E&M SERVICE: CPT | Mod: S$GLB,,, | Performed by: FAMILY MEDICINE

## 2017-04-27 PROCEDURE — 1160F RVW MEDS BY RX/DR IN RCRD: CPT | Mod: S$GLB,,, | Performed by: FAMILY MEDICINE

## 2017-04-27 PROCEDURE — 1159F MED LIST DOCD IN RCRD: CPT | Mod: S$GLB,,, | Performed by: FAMILY MEDICINE

## 2017-04-27 RX ORDER — PREDNISONE 50 MG/1
50 TABLET ORAL DAILY
Qty: 7 TABLET | Refills: 0 | Status: SHIPPED | OUTPATIENT
Start: 2017-04-27 | End: 2017-05-04

## 2017-04-27 NOTE — TELEPHONE ENCOUNTER
Reason for Disposition   SEVERE pain (e.g., excruciating, unable to do any normal activities)    Protocols used:  FOOT PAIN-A-OH    Pablito is calling to request an appointment.  He has left foot pain and swelling.  States he is scheduled to see a Rheumatologist but not until June and can not wait that long. States took tatianna dose of ASA last night but is still hurting.  Appointment scheduled.

## 2017-04-27 NOTE — PROGRESS NOTES
Subjective:   Patient ID: Pablito Givens Jr. is a 77 y.o. male.  Chief Complaint:  Foot Pain    HPI Comments: Patient presents for second opinion regarding left foot pain and chronic recurrent gout.  Has appointment with rheumatology June 2.  Review of chart shows previous evaluation and diagnosis of gout by rheumatology with allopurinol preventative and colchicine one needed   Unfortunately history kidney cancer with nephrectomy and most recent EGFR 45.2 with creatinine 1.5. Nephrology advised no allopurinol or cold to seen use   Multiple frequent attacks since then.    Prediabetes with A1c 6.1 recently.    Vitamin D on weekly high dose supplementation.      Review of Systems   Constitutional: Negative for chills, fatigue and fever.   Eyes: Negative for visual disturbance.   Respiratory: Negative for cough, chest tightness, shortness of breath and wheezing.    Cardiovascular: Negative for chest pain, palpitations and leg swelling.   Gastrointestinal: Negative for abdominal pain, constipation, diarrhea, nausea and vomiting.   Endocrine: Negative for polydipsia, polyphagia and polyuria.   Musculoskeletal: Positive for arthralgias, gait problem and joint swelling. Negative for myalgias.   Skin: Negative for rash.   Neurological: Negative for dizziness, syncope, weakness, numbness and headaches.       Current Outpatient Prescriptions:     acetaminophen (TYLENOL) 650 MG TbSR, Take 1 tablet (650 mg total) by mouth every 8 (eight) hours., Disp: , Rfl: 0    aspirin 81 MG Chew, Take 81 mg by mouth once daily., Disp: , Rfl:     ergocalciferol (ERGOCALCIFEROL) 50,000 unit Cap, Take 1 capsule (50,000 Units total) by mouth every 7 days., Disp: 12 capsule, Rfl: 0    hydrochlorothiazide (HYDRODIURIL) 25 MG tablet, Take 1 tablet (25 mg total) by mouth once daily., Disp: 90 tablet, Rfl: 3    lisinopril (PRINIVIL,ZESTRIL) 20 MG tablet, Take 1 tablet (20 mg total) by mouth once daily., Disp: 90 tablet, Rfl: 3    zolpidem  "(AMBIEN) 5 MG Tab, TAKE ONE TABLET BY MOUTH IN THE EVENING, Disp: 90 tablet, Rfl: 0    predniSONE (DELTASONE) 50 MG Tab, Take 1 tablet (50 mg total) by mouth once daily., Disp: 7 tablet, Rfl: 0    Objective:   /72 (BP Location: Right arm, Patient Position: Sitting, BP Method: Manual)  Temp 97.8 °F (36.6 °C) (Tympanic)   Ht 6' 1" (1.854 m)  Wt 103.8 kg (228 lb 13.4 oz)  BMI 30.19 kg/m2    Physical Exam   Constitutional: He appears well-developed and well-nourished.   Eyes: Conjunctivae are normal. Right eye exhibits no discharge. Left eye exhibits no discharge. No scleral icterus.   Neck: No JVD present.   Cardiovascular: Normal rate, regular rhythm and normal heart sounds.  Exam reveals no gallop and no friction rub.    No murmur heard.  Pulmonary/Chest: Effort normal and breath sounds normal. He has no wheezes. He has no rhonchi. He has no rales.   Abdominal: Soft. He exhibits no distension. There is no tenderness. There is no rebound and no guarding.   Musculoskeletal: He exhibits no edema.   Skin: Skin is warm and dry. No rash noted.   Psychiatric: He has a normal mood and affect.   Nursing note and vitals reviewed.    Assessment:     1. Idiopathic chronic gout of multiple sites with tophus    2. Stage 3 chronic kidney disease    3. Vitamin D deficiency disease    4. Prediabetes    5. History of nephrectomy, unilateral      Plan:   Idiopathic chronic gout of multiple sites with tophus  -     predniSONE (DELTASONE) 50 MG Tab; Take 1 tablet (50 mg total) by mouth once daily.  Dispense: 7 tablet; Refill: 0  Treat present acute attack with prednisone.  Tylenol over-the-counter.  No colchicine.  No over-the-counter NSAIDs.  Keep rheumatology appointment regarding maintenance preventative medicine of allopurinol despite kidney risk or possible daily low-dose prednisone.    Stage 3 chronic kidney disease/History of nephrectomy, unilateral  Patient refuses/declines nephrology referral today.      Vitamin D " deficiency disease  Continue weekly high-dose supplement.  Return to clinic 2 months as planned.  Recheck levels in.    Prediabetes  Stable.  Recheck A1c next visit.     RTC 2 months

## 2017-04-27 NOTE — MR AVS SNAPSHOT
[facility]  [address] [facility] [address] [facility] [place] [address]  Phone: [phone]  Fax: [phone]                  Pablito Givens Jr.   2017 1:20 PM   Office Visit    Description:  Male : 1940   Provider:  Christiano Nieto MD   Department:  [facility] - Internal Medicine           Reason for Visit     Foot Pain           Diagnoses this Visit        Comments    Idiopathic chronic gout of multiple sites with tophus    -  Primary     Stage 3 chronic kidney disease         Vitamin D deficiency disease         Prediabetes         History of nephrectomy, unilateral                To Do List           Future Appointments        Provider Department Dept Phone    2017 10:30 AM Larry Zepeda MD [facility] Rheumatology [phone]    2017 9:20 AM Christiano Nieto MD [facility] Internal Medicine [phone]    1/3/2018 9:30 AM Josué Cook OD [facility] [phone]      Goals (5 Years of Data)     None      Follow-Up and Disposition     Return for As Planned with Dr Nieto, As Planned with Rhematologist.       These Medications        Disp Refills Start End    predniSONE (DELTASONE) 50 MG Tab 7 tablet 0 2017    Take 1 tablet (50 mg total) by mouth once daily. - Oral    Pharmacy: [facility] [id number]  [place], [place] - [address] [facility] #: [phone]         [facility]     [facility] -  Assistance  Unless otherwise directed by your provider, please contact [facility], our nurse care line that is available for  assistance.     Registered nurses in the [facility] provide: appointment scheduling, clinical advisement, health education, and other advisory services.  Call: [phone] (toll free)               Medications           Message regarding Medications     Verify the changes and/or additions to your medication regime listed below are the same as discussed with your clinician today.  If any of  "these changes or additions are incorrect, please notify your healthcare provider.        START taking these NEW medications        Refills    predniSONE (DELTASONE) 50 MG Tab 0    Sig: Take 1 tablet (50 mg total) by mouth once daily.    Class: Normal    Route: Oral      STOP taking these medications     amitriptyline (ELAVIL) 25 MG tablet Take 1 tablet (25 mg total) by mouth every evening.    diclofenac sodium 1 % Gel Apply 2 g topically 4 (four) times daily.    meloxicam (MOBIC) 15 MG tablet Take 1 tablet (15 mg total) by mouth once daily.    colchicine 0.6 mg tablet Take 1 tablet (0.6 mg total) by mouth 2 (two) times daily.    lancets (ACCU-CHEK SOFTCLIX LANCETS) Misc Check  blood sugars as soon as you wake up and 2 hrs after biggest meal           Verify that the below list of medications is an accurate representation of the medications you are currently taking.  If none reported, the list may be blank. If incorrect, please contact your healthcare provider. Carry this list with you in case of emergency.           Current Medications     acetaminophen (TYLENOL) 650 MG TbSR Take 1 tablet (650 mg total) by mouth every 8 (eight) hours.    aspirin 81 MG Chew Take 81 mg by mouth once daily.    ergocalciferol (ERGOCALCIFEROL) 50,000 unit Cap Take 1 capsule (50,000 Units total) by mouth every 7 days.    hydrochlorothiazide (HYDRODIURIL) 25 MG tablet Take 1 tablet (25 mg total) by mouth once daily.    lisinopril (PRINIVIL,ZESTRIL) 20 MG tablet Take 1 tablet (20 mg total) by mouth once daily.    predniSONE (DELTASONE) 50 MG Tab Take 1 tablet (50 mg total) by mouth once daily.    zolpidem (AMBIEN) 5 MG Tab TAKE ONE TABLET BY MOUTH IN THE EVENING           Clinical Reference Information           Your Vitals Were     BP Temp Height Weight BMI    122/72 (BP Location: Right arm, Patient Position: Sitting, BP Method: Manual) 97.8 °F (36.6 °C) (Tympanic) 6' 1" (1.854 m) 103.8 kg (228 lb 13.4 oz) 30.19 kg/m2      Blood Pressure  "         Most Recent Value    BP  122/72      Allergies as of 4/27/2017     Amlodipine    Demerol [Meperidine]    Oxycodone    Darvocet A500  [Propoxyphene N-acetaminophen]    Opioids-meperidine And Related    Codeine      Immunizations Administered on Date of Encounter - 4/27/2017     None      Language Assistance Services     ATTENTION: Language assistance services are available, free of charge. Please call [phone].      ATENCIÓN: Si habla español, tiene a han disposición servicios gratuitos de asistencia lingüística. Llame al [phone].     [facility] Ý: N?u b?n nói Ti?ng Vi?t, có các d?ch v? h? tr? ngôn ng? mi?n phí dành cho b?n. G?i s? [phone].         [facility] complies with applicable Federal civil rights laws and does not discriminate on the basis of race, color, national origin, age, disability, or sex.

## 2017-05-07 ENCOUNTER — PATIENT MESSAGE (OUTPATIENT)
Dept: INTERNAL MEDICINE | Facility: CLINIC | Age: 77
End: 2017-05-07

## 2017-05-08 ENCOUNTER — TELEPHONE (OUTPATIENT)
Dept: RHEUMATOLOGY | Facility: CLINIC | Age: 77
End: 2017-05-08

## 2017-05-08 ENCOUNTER — OFFICE VISIT (OUTPATIENT)
Dept: INTERNAL MEDICINE | Facility: CLINIC | Age: 77
End: 2017-05-08
Payer: MEDICARE

## 2017-05-08 ENCOUNTER — TELEPHONE (OUTPATIENT)
Dept: INTERNAL MEDICINE | Facility: CLINIC | Age: 77
End: 2017-05-08

## 2017-05-08 ENCOUNTER — HOSPITAL ENCOUNTER (OUTPATIENT)
Dept: RADIOLOGY | Facility: HOSPITAL | Age: 77
Discharge: HOME | End: 2017-05-08
Attending: FAMILY MEDICINE
Payer: MEDICARE

## 2017-05-08 ENCOUNTER — PATIENT MESSAGE (OUTPATIENT)
Dept: RHEUMATOLOGY | Facility: CLINIC | Age: 77
End: 2017-05-08

## 2017-05-08 ENCOUNTER — PATIENT MESSAGE (OUTPATIENT)
Dept: INTERNAL MEDICINE | Facility: CLINIC | Age: 77
End: 2017-05-08

## 2017-05-08 VITALS
HEIGHT: 73 IN | HEART RATE: 98 BPM | SYSTOLIC BLOOD PRESSURE: 110 MMHG | TEMPERATURE: 101 F | OXYGEN SATURATION: 98 % | DIASTOLIC BLOOD PRESSURE: 80 MMHG

## 2017-05-08 DIAGNOSIS — M1A.09X1 IDIOPATHIC CHRONIC GOUT OF MULTIPLE SITES WITH TOPHUS: Chronic | ICD-10-CM

## 2017-05-08 DIAGNOSIS — Z85.528 HISTORY OF RENAL CELL CARCINOMA: ICD-10-CM

## 2017-05-08 DIAGNOSIS — E55.9 VITAMIN D DEFICIENCY DISEASE: Chronic | ICD-10-CM

## 2017-05-08 DIAGNOSIS — Z90.5 HISTORY OF NEPHRECTOMY, UNILATERAL: ICD-10-CM

## 2017-05-08 DIAGNOSIS — M25.571 ACUTE RIGHT ANKLE PAIN: Primary | ICD-10-CM

## 2017-05-08 DIAGNOSIS — M25.571 ACUTE RIGHT ANKLE PAIN: ICD-10-CM

## 2017-05-08 DIAGNOSIS — N18.30 STAGE 3 CHRONIC KIDNEY DISEASE: Chronic | ICD-10-CM

## 2017-05-08 PROCEDURE — 99999 PR PBB SHADOW E&M-EST. PATIENT-LVL III: CPT | Mod: 25,PBBFAC,, | Performed by: FAMILY MEDICINE

## 2017-05-08 PROCEDURE — 73630 X-RAY EXAM OF FOOT: CPT | Mod: 26,50,, | Performed by: RADIOLOGY

## 2017-05-08 PROCEDURE — 3079F DIAST BP 80-89 MM HG: CPT | Mod: S$GLB,,, | Performed by: FAMILY MEDICINE

## 2017-05-08 PROCEDURE — 73610 X-RAY EXAM OF ANKLE: CPT | Mod: 26,RT,, | Performed by: RADIOLOGY

## 2017-05-08 PROCEDURE — 99499 UNLISTED E&M SERVICE: CPT | Mod: S$GLB,,, | Performed by: FAMILY MEDICINE

## 2017-05-08 PROCEDURE — 1157F ADVNC CARE PLAN IN RCRD: CPT | Mod: S$GLB,,, | Performed by: FAMILY MEDICINE

## 2017-05-08 PROCEDURE — 1159F MED LIST DOCD IN RCRD: CPT | Mod: S$GLB,,, | Performed by: FAMILY MEDICINE

## 2017-05-08 PROCEDURE — 73630 X-RAY EXAM OF FOOT: CPT | Mod: 50,TC,PO,RT

## 2017-05-08 PROCEDURE — 73610 X-RAY EXAM OF ANKLE: CPT | Mod: TC,PO,RT

## 2017-05-08 PROCEDURE — 3074F SYST BP LT 130 MM HG: CPT | Mod: S$GLB,,, | Performed by: FAMILY MEDICINE

## 2017-05-08 PROCEDURE — 1160F RVW MEDS BY RX/DR IN RCRD: CPT | Mod: S$GLB,,, | Performed by: FAMILY MEDICINE

## 2017-05-08 PROCEDURE — 99214 OFFICE O/P EST MOD 30 MIN: CPT | Mod: S$GLB,,, | Performed by: FAMILY MEDICINE

## 2017-05-08 RX ORDER — PREDNISONE 50 MG/1
50 TABLET ORAL DAILY
Qty: 3 TABLET | Refills: 0 | Status: SHIPPED | OUTPATIENT
Start: 2017-05-08 | End: 2017-05-11

## 2017-05-08 NOTE — PROGRESS NOTES
Subjective:       Patient ID: Pablito Givens Jr. is a 77 y.o. male.    Chief Complaint: Gout    HPI Comments: 77-year-old male patient of Dr. Nieto accompanied by his wife with Patient Active Problem List:     Hypertension     Gout, chronic     Testosterone deficiency     Osteoarthritis of both knees     Vitamin D deficiency disease     Prediabetes     Kidney carcinoma     History of total left knee replacement     Neuropathy     Primary osteoarthritis of right elbow     Stage 3 chronic kidney disease     History of nephrectomy, unilateral  Here with complaint of bilateral feet pain but worse to the right ankle, to the outer side since Friday.  Patient was recently seen by Dr. Nieto for chronic gout, and was started on prednisone 50 mg daily for 7 days, after finishing prednisone patient started having stiffness to his feet, and felt fever to his feet, patient not able to walk since yesterday secondary to severe pain, not able to keep her weight.  Denies of any injury or trauma.   Patient secondary to left nephrectomy status post renal cancer, has been out of colchicine, has appointment with rheumatology tomorrow for prophylactic medication for frequent gout exacerbations.   Patient reports that she's been feeling weak and has been having low-grade fever.  Currently taking vitamin D by prescription  Has been taking hydrochlorothiazide for blood pressure.       Review of Systems   Constitutional: Positive for fatigue and fever. Negative for appetite change and chills.   Eyes: Negative for visual disturbance.   Respiratory: Negative for shortness of breath.    Cardiovascular: Negative for chest pain and leg swelling.   Gastrointestinal: Negative for abdominal pain, nausea and vomiting.   Musculoskeletal: Positive for arthralgias, gait problem and myalgias.   Skin: Negative for rash.   Neurological: Negative for headaches.   Psychiatric/Behavioral: Negative for sleep disturbance.         /80  Pulse 98   "Temp (!) 100.8 °F (38.2 °C) (Tympanic)   Ht 6' 1" (1.854 m)  SpO2 98%  Objective:      Physical Exam   Constitutional: He is oriented to person, place, and time. He appears well-developed and well-nourished.   HENT:   Head: Normocephalic and atraumatic.   Mouth/Throat: Oropharynx is clear and moist.   Cardiovascular: Normal rate, regular rhythm and normal heart sounds.    Pulmonary/Chest: Effort normal and breath sounds normal. He has no wheezes.   Abdominal: Soft. Bowel sounds are normal.   Musculoskeletal: He exhibits tenderness. He exhibits no edema.   Positive for tenderness to the right ankle laterally, and dorsal foot tenderness on the right side.  Minimal tenderness noted at the left side of the left foot anteriorly and dorsally warmth noted in bilateral feet  Positive for bunions     Neurological: He is alert and oriented to person, place, and time.   Skin: Skin is warm and dry. No rash noted. No erythema.   Psychiatric: He has a normal mood and affect.         Assessment:       1. Acute right ankle pain    2. Idiopathic chronic gout of multiple sites with tophus    3. Stage 3 chronic kidney disease    4. History of nephrectomy, unilateral    5. History of renal cell carcinoma    6. Vitamin D deficiency disease        Plan:   Acute right ankle pain  -     CBC auto differential; Future; Expected date: 5/8/17  -     Basic metabolic panel; Future; Expected date: 5/8/17  -     C-reactive protein; Future; Expected date: 5/8/17  -     X-Ray Foot AP Bilateral; Future; Expected date: 5/8/17  -     X-Ray Ankle Complete Right; Future; Expected date: 5/8/17  -     predniSONE (DELTASONE) 50 MG Tab; Take 1 tablet (50 mg total) by mouth once daily.  Dispense: 3 tablet; Refill: 0  -     Sedimentation rate, manual; Future; Expected date: 5/8/17    Idiopathic chronic gout of multiple sites with tophus  -     Uric acid; Future; Expected date: 5/8/17  -     X-Ray Foot AP Bilateral; Future; Expected date: 5/8/17  -     X-Ray " Ankle Complete Right; Future; Expected date: 5/8/17  -     predniSONE (DELTASONE) 50 MG Tab; Take 1 tablet (50 mg total) by mouth once daily.  Dispense: 3 tablet; Refill: 0    Secondary to history of chronic gout and acute right ankle and bilateral feet pain, we will get further x-rays.  Prednisone 50 mg daily given for 3 days.  Patient was advised to keep appointments with rheumatology tomorrow  Continue taking Tylenol as needed for fever/pain.   Elevate lower extremities  Drink adequate fluids    discuss further with rheumatology regarding discontinuation of hydrochlorothiazide, to see whether it has any role to play for frequent gout exacerbations and to discuss about prophylactic medication  Secondary to low-grade fever, and warmth noted to bilateral feet, if having any elevated white blood cell count and after reviewing x-rays, may consider starting on antibiotics.       Stage 3 chronic kidney disease  -     Basic metabolic panel; Future; Expected date: 5/8/17  Will recheck labs today , past labs showed improvement in kidney functions     History of nephrectomy, unilateral-status post left nephrectomy  History of renal cell carcinoma    Vitamin D deficiency disease  -     Vitamin D; Future; Expected date: 5/8/17  Currently taking prescription vitamin D

## 2017-05-08 NOTE — TELEPHONE ENCOUNTER
MD Sharita Bustillos LPN        Caller: Unspecified (Today,  9:32 AM)                     Patient needs to see rheumatology to discuss long-term low-dose prednisone with its risks versus allopurinol and its risk in light of his renal disease.   Looks like he has a visit planned on May 10 per rheumatology notes

## 2017-05-08 NOTE — TELEPHONE ENCOUNTER
Spoke with Wife and Patient and advised them that Dr. ALFONSO would see them earlier but since we will be overbooking to work them in they have a a longer wait time. Made an apt with  for 5/10/17 at 1:30 pm. Verbalized understanding. Will send urgent message to PCP's office for a call back as requested by wife and patient.

## 2017-05-08 NOTE — TELEPHONE ENCOUNTER
Spoke with pt and advised him that we had a cancellation for 10.30 am on 5-9-17, pt accepted new appointment time, appointment for 5-10-17 at 1.30 and appointment for 6-2-17 at 10.30 am cancelled, pt verbalized understanding.

## 2017-05-08 NOTE — TELEPHONE ENCOUNTER
Pt would like to know what else he can do because he can barely walk or does he need to wait for his appt on 5/10/17. Please advise.

## 2017-05-08 NOTE — TELEPHONE ENCOUNTER
S/w pt. Pt stated that he took prednisone for 7 days and he was doing a little better but once he finished 7 days of prednisone his gout flared up even worse and he can't ever walk. Pt is wanting to know what he can do or if another round of steroids that he can gradually taper off can be sent into pharmacy. Told pt that I would discuss with Dr. Nieto and return his call. Pt verbalized understanding.

## 2017-05-08 NOTE — TELEPHONE ENCOUNTER
Bring the patient anytime. Please make him aware that we are overbooking him and there might be some unusual waiting before seen- give a two hour appointment window.Thanks.

## 2017-05-08 NOTE — MR AVS SNAPSHOT
[facility]  [address] [facility] [place] [address]  Phone: [phone]  Fax: [phone]                  Pablito Givens Jr.   2017 12:40 PM   Office Visit    Description:  Male : 1940   Provider:  Daily Eller MD   Department:  [facility] - Internal Medicine           Reason for Visit     Gout           Diagnoses this Visit        Comments    Acute right ankle pain    -  Primary     Idiopathic chronic gout of multiple sites with tophus         Stage 3 chronic kidney disease         History of nephrectomy, unilateral         History of renal cell carcinoma         Vitamin D deficiency disease                To Do List           Future Appointments        Provider Department Dept Phone    2017 2:15 PM LABORATORY, [facility] [phone]    2017 2:30 PM [facility] XR2 [facility] [phone]    2017 10:30 AM Larry Zepeda MD [facility] Rheumatology [phone]    2017 9:20 AM Christiano Nieto MD [facility] [phone]    1/3/2018 9:30 AM Josué Cook OD [facility] Ophthalmology [phone]      Goals (5 Years of Data)     None      Follow-Up and Disposition     Return if symptoms worsen or fail to improve.       These Medications        Disp Refills Start End    predniSONE (DELTASONE) 50 MG Tab 3 tablet 0 2017    Take 1 tablet (50 mg total) by mouth once daily. - Oral    Pharmacy: [facility] [id number]  [place], [place] - [address] [facility] Ph #: [phone]         [facility]     [facility] Nurse Care Line -  Assistance  Unless otherwise directed by your provider, please contact [facility], our nurse care line that is available for  assistance.     Registered nurses in the [facility] provide: appointment scheduling, clinical advisement, health education, and other advisory services.  Call: [phone] (toll free)               Medications          "  Message regarding Medications     Verify the changes and/or additions to your medication regime listed below are the same as discussed with your clinician today.  If any of these changes or additions are incorrect, please notify your healthcare provider.        START taking these NEW medications        Refills    predniSONE (DELTASONE) 50 MG Tab 0    Sig: Take 1 tablet (50 mg total) by mouth once daily.    Class: Normal    Route: Oral           Verify that the below list of medications is an accurate representation of the medications you are currently taking.  If none reported, the list may be blank. If incorrect, please contact your healthcare provider. Carry this list with you in case of emergency.           Current Medications     acetaminophen (TYLENOL) 650 MG TbSR Take 1 tablet (650 mg total) by mouth every 8 (eight) hours.    aspirin 81 MG Chew Take 81 mg by mouth once daily.    ergocalciferol (ERGOCALCIFEROL) 50,000 unit Cap Take 1 capsule (50,000 Units total) by mouth every 7 days.    hydrochlorothiazide (HYDRODIURIL) 25 MG tablet Take 1 tablet (25 mg total) by mouth once daily.    lisinopril (PRINIVIL,ZESTRIL) 20 MG tablet Take 1 tablet (20 mg total) by mouth once daily.    predniSONE (DELTASONE) 50 MG Tab Take 1 tablet (50 mg total) by mouth once daily.    zolpidem (AMBIEN) 5 MG Tab TAKE ONE TABLET BY MOUTH IN THE EVENING           Clinical Reference Information           Your Vitals Were     BP Pulse Temp Height SpO2       110/80 98 100.8 °F (38.2 °C) (Tympanic) 6' 1" (1.854 m) 98%       Blood Pressure          Most Recent Value    BP  110/80      Allergies as of 5/8/2017     Amlodipine    Demerol [Meperidine]    Oxycodone    Darvocet A500  [Propoxyphene N-acetaminophen]    Opioids-meperidine And Related    Codeine      Immunizations Administered on Date of Encounter - 5/8/2017     None      Orders Placed During Today's Visit     Future Labs/Procedures Expected by Expires    Basic metabolic panel  " 5/8/2017 7/7/2018    C-reactive protein  5/8/2017 7/7/2018    CBC auto differential  5/8/2017 7/7/2018    Sedimentation rate, manual  5/8/2017 7/7/2018    Uric acid  5/8/2017 7/7/2018    Vitamin D  5/8/2017 7/7/2018    X-Ray Ankle Complete Right  5/8/2017 5/8/2018    X-Ray Foot AP Bilateral  5/8/2017 5/8/2018      Language Assistance Services     ATTENTION: Language assistance services are available, free of charge. Please call [phone].      ATENCIÓN: Si habla español, tiene a han disposición servicios gratuitos de asistencia lingüística. Llame al [phone].     CHÚ Ý: N?u b?n nói Ti?ng Vi?t, có các d?ch v? h? tr? ngôn ng? mi?n phí dành cho b?n. G?i s? [phone].         [facility] - Internal Medicine complies with applicable Federal civil rights laws and does not discriminate on the basis of race, color, national origin, age, disability, or sex.

## 2017-05-08 NOTE — TELEPHONE ENCOUNTER
MD Sharita Bustillos LPN        Caller: Unspecified (Today,  9:32 AM)                     Needs to wait and see rheumatology for any additional steroids.   For the pain and ordert tramadol prescription if he would like.

## 2017-05-08 NOTE — TELEPHONE ENCOUNTER
----- Message from Jeniffer Coley sent at 5/8/2017  8:19 AM CDT -----  Contact: wife - Mayda [phone]  Pt too all of the prednisone and was better for a bit but the Gout has re-flared up worse and he can not even get out of bed.  Ms Ohara sent messages on the portal last night and this morning.  Please call her ASAP at the above number

## 2017-05-09 ENCOUNTER — INITIAL CONSULT (OUTPATIENT)
Dept: RHEUMATOLOGY | Facility: CLINIC | Age: 77
End: 2017-05-09
Payer: MEDICARE

## 2017-05-09 ENCOUNTER — TELEPHONE (OUTPATIENT)
Dept: INTERNAL MEDICINE | Facility: CLINIC | Age: 77
End: 2017-05-09

## 2017-05-09 ENCOUNTER — DOCUMENTATION ONLY (OUTPATIENT)
Dept: RHEUMATOLOGY | Facility: CLINIC | Age: 77
End: 2017-05-09

## 2017-05-09 ENCOUNTER — PATIENT MESSAGE (OUTPATIENT)
Dept: INTERNAL MEDICINE | Facility: CLINIC | Age: 77
End: 2017-05-09

## 2017-05-09 VITALS
SYSTOLIC BLOOD PRESSURE: 124 MMHG | BODY MASS INDEX: 29.61 KG/M2 | WEIGHT: 224.44 LBS | DIASTOLIC BLOOD PRESSURE: 61 MMHG | HEART RATE: 97 BPM

## 2017-05-09 DIAGNOSIS — M10.071 ACUTE IDIOPATHIC GOUT OF RIGHT ANKLE: ICD-10-CM

## 2017-05-09 DIAGNOSIS — M1A.09X1 IDIOPATHIC CHRONIC GOUT OF MULTIPLE SITES WITH TOPHUS: Primary | Chronic | ICD-10-CM

## 2017-05-09 DIAGNOSIS — L08.9 FOOT INFECTION: Primary | ICD-10-CM

## 2017-05-09 PROCEDURE — 1125F AMNT PAIN NOTED PAIN PRSNT: CPT | Mod: S$GLB,,, | Performed by: INTERNAL MEDICINE

## 2017-05-09 PROCEDURE — 99499 UNLISTED E&M SERVICE: CPT | Mod: S$GLB,,, | Performed by: INTERNAL MEDICINE

## 2017-05-09 PROCEDURE — 1159F MED LIST DOCD IN RCRD: CPT | Mod: S$GLB,,, | Performed by: INTERNAL MEDICINE

## 2017-05-09 PROCEDURE — 3078F DIAST BP <80 MM HG: CPT | Mod: S$GLB,,, | Performed by: INTERNAL MEDICINE

## 2017-05-09 PROCEDURE — 1160F RVW MEDS BY RX/DR IN RCRD: CPT | Mod: S$GLB,,, | Performed by: INTERNAL MEDICINE

## 2017-05-09 PROCEDURE — 3074F SYST BP LT 130 MM HG: CPT | Mod: S$GLB,,, | Performed by: INTERNAL MEDICINE

## 2017-05-09 PROCEDURE — 1157F ADVNC CARE PLAN IN RCRD: CPT | Mod: S$GLB,,, | Performed by: INTERNAL MEDICINE

## 2017-05-09 PROCEDURE — 99999 PR PBB SHADOW E&M-EST. PATIENT-LVL III: CPT | Mod: PBBFAC,,, | Performed by: INTERNAL MEDICINE

## 2017-05-09 PROCEDURE — 99215 OFFICE O/P EST HI 40 MIN: CPT | Mod: S$GLB,,, | Performed by: INTERNAL MEDICINE

## 2017-05-09 RX ORDER — PREDNISONE 10 MG/1
TABLET ORAL
Qty: 60 TABLET | Refills: 1 | Status: SHIPPED | OUTPATIENT
Start: 2017-05-09 | End: 2017-07-13 | Stop reason: SDUPTHER

## 2017-05-09 RX ORDER — FEBUXOSTAT 40 MG/1
40 TABLET, FILM COATED ORAL DAILY
Qty: 30 TABLET | Refills: 3 | Status: SHIPPED | OUTPATIENT
Start: 2017-05-09 | End: 2017-07-13 | Stop reason: SDUPTHER

## 2017-05-09 RX ORDER — DOXYCYCLINE 100 MG/1
100 CAPSULE ORAL EVERY 12 HOURS
Qty: 14 CAPSULE | Refills: 0 | Status: SHIPPED | OUTPATIENT
Start: 2017-05-09 | End: 2017-05-16

## 2017-05-09 NOTE — ASSESSMENT & PLAN NOTE
Resolving already from the prednisone given by  yesterday. Taper off prednisone gradually. High WBC and CRP could be related to acute gout since there is no obvious signs of infections present . Repeat labs next week to follow up on the leukocytosis. Agree with empiric antibiotic therapy prescribed by .

## 2017-05-09 NOTE — TELEPHONE ENCOUNTER
Spoke to pt. Wife. Informed her of pt results. recommendations and medication. Pt wife verbalized understanding

## 2017-05-09 NOTE — LETTER
May 9, 2017      Melvin Garrett MD  [address] [place] [address] [address] [place] [address]           [facility] - Rheumatology  [address] [place] [address]  Phone: [phone]  Fax: [phone]          Patient: Pablito Givens Jr.   MR Number: [id number]   YOB: 1940   Date of Visit: 5/9/2017       Dear Dr. Melvin Garrett:    Thank you for referring Pablito Givens to me for evaluation. Attached you will find relevant portions of my assessment and plan of care.    If you have questions, please do not hesitate to call me. I look forward to following Pablito Givens along with you.    Sincerely,    Larry Zepeda MD    Enclosure  CC:  MD Daily Bustillos MD    If you would like to receive this communication electronically, please contact [email or web address] or [phone] to request more information on [employer] Link access.    For providers and/or their staff who would like to refer a patient to [facility], please contact us through our one-stop-shop provider referral line, [facility], at [phone].    If you feel you have received this communication in error or would no longer like to receive these types of communications, please e-mail [email or web address]

## 2017-05-09 NOTE — MR AVS SNAPSHOT
[facility] - Rheumatology  [address] [facility] [place] [address]  Phone: [phone]  Fax: [phone]                  Pablito Givens JrYasmani   2017 10:30 AM   Initial consult    Description:  Male : 1940   Provider:  Larry Zepeda MD   Department[facility]  [facility] - Rheumatology           Reason for Visit     Gout           Diagnoses this Visit        Comments    Idiopathic chronic gout of multiple sites with tophus    -  Primary     Acute idiopathic gout of right ankle                To Do List           Future Appointments        Provider Department Dept Phone    2017 9:20 AM Christiano Nieto MD [facility] - Internal Medicine [phone]    1/3/2018 9:30 AM Josué Cook OD [facility] - Ophthalmology [phone]      Goals (5 Years of Data)     None      Follow-Up and Disposition     Return in about 2 months (around 2017).       These Medications        Disp Refills Start End    predniSONE (DELTASONE) 10 MG tablet 60 tablet 1 2017     Take 20 mg once daily for one week, then 15 mg daily next week, then 10 mg daily next week and then 5 mg daily thereafter.    Pharmacy: [facility] [address] [place] [address] [facility] Ph #: [phone]       Notes to Pharmacy: To start this dose after completion of the 50 mg.    febuxostat (ULORIC) 40 mg Tab 30 tablet 3 2017     Take 1 tablet (40 mg total) by mouth once daily. - Oral    Pharmacy: [facility] [address] [place] [address] [facility] Ph #: [phone]         [facility]     [facility] -  Assistance  Unless otherwise directed by your provider, please contact [facility], our nurse care line that is available for  assistance.     Registered nurses in the [facility] provide: appointment scheduling, clinical advisement, health education, and other advisory services.  Call: [phone] (toll free)               Medications           Message regarding Medications     Verify  the changes and/or additions to your medication regime listed below are the same as discussed with your clinician today.  If any of these changes or additions are incorrect, please notify your healthcare provider.        START taking these NEW medications        Refills    predniSONE (DELTASONE) 10 MG tablet 1    Sig: Take 20 mg once daily for one week, then 15 mg daily next week, then 10 mg daily next week and then 5 mg daily thereafter.    Class: Normal    febuxostat (ULORIC) 40 mg Tab 3    Sig: Take 1 tablet (40 mg total) by mouth once daily.    Class: Normal    Route: Oral           Verify that the below list of medications is an accurate representation of the medications you are currently taking.  If none reported, the list may be blank. If incorrect, please contact your healthcare provider. Carry this list with you in case of emergency.           Current Medications     acetaminophen (TYLENOL) 650 MG TbSR Take 1 tablet (650 mg total) by mouth every 8 (eight) hours.    aspirin 81 MG Chew Take 81 mg by mouth once daily.    doxycycline (VIBRAMYCIN) 100 MG Cap Take 1 capsule (100 mg total) by mouth every 12 (twelve) hours.    ergocalciferol (ERGOCALCIFEROL) 50,000 unit Cap Take 1 capsule (50,000 Units total) by mouth every 7 days.    hydrochlorothiazide (HYDRODIURIL) 25 MG tablet Take 1 tablet (25 mg total) by mouth once daily.    lisinopril (PRINIVIL,ZESTRIL) 20 MG tablet Take 1 tablet (20 mg total) by mouth once daily.    predniSONE (DELTASONE) 50 MG Tab Take 1 tablet (50 mg total) by mouth once daily.    zolpidem (AMBIEN) 5 MG Tab TAKE ONE TABLET BY MOUTH IN THE EVENING    febuxostat (ULORIC) 40 mg Tab Take 1 tablet (40 mg total) by mouth once daily.    predniSONE (DELTASONE) 10 MG tablet Take 20 mg once daily for one week, then 15 mg daily next week, then 10 mg daily next week and then 5 mg daily thereafter.           Clinical Reference Information           Your Vitals Were     BP Pulse Weight BMI        124/61 97 101.8 kg (224 lb 6.9 oz) 29.61 kg/m2       Blood Pressure          Most Recent Value    BP  124/61      Allergies as of 5/9/2017     Allopurinol Analogues    Amlodipine    Demerol [Meperidine]    Oxycodone    Darvocet A500  [Propoxyphene N-acetaminophen]    Opioids-meperidine And Related    Codeine      Immunizations Administered on Date of Encounter - 5/9/2017     None      Orders Placed During Today's Visit     Recurring Lab Work Interval Expires    C-reactive protein   5/9/2018    CBC auto differential   5/9/2018    Sedimentation rate, manual   5/9/2018      Language Assistance Services     ATTENTION: Language assistance services are available, free of charge. Please call [phone].      ATENCIÓN: Si klaudia maynard, tiene a han disposición servicios gratuitos de asistencia lingüística. Llame al [phone].     CHÚ Ý: N?u b?n nói Ti?ng Vi?t, có các d?ch v? h? tr? ngôn ng? mi?n phí dành cho b?n. G?i s? [phone].         [facility] - [facility]ology complies with applicable Federal civil rights laws and does not discriminate on the basis of race, color, national origin, age, disability, or sex.

## 2017-05-09 NOTE — ASSESSMENT & PLAN NOTE
Active, uncontrolled progressively worsening chronic tophaceous gout with uncontrolled uric acid levels and frequent flare ups.  Allopurinol contraindicated because of his intolerance to it in past and due to his CKD . Start Uloric. Treat with prednisone and intraarticular corticosteroids. I would recommend to consider swapping HCTZ to an ARB's to facilitate renal uric acid elimination. ARB's have shown to reduce uric acid level by 1mg/dl. Diet measures advised.
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
## 2017-05-16 ENCOUNTER — TELEPHONE (OUTPATIENT)
Dept: RHEUMATOLOGY | Facility: CLINIC | Age: 77
End: 2017-05-16

## 2017-05-16 ENCOUNTER — LAB VISIT (OUTPATIENT)
Dept: LAB | Facility: HOSPITAL | Age: 77
End: 2017-05-16
Attending: INTERNAL MEDICINE
Payer: MEDICARE

## 2017-05-16 DIAGNOSIS — M10.071 ACUTE IDIOPATHIC GOUT OF RIGHT ANKLE: ICD-10-CM

## 2017-05-16 DIAGNOSIS — M54.31 BILATERAL SCIATICA: Primary | ICD-10-CM

## 2017-05-16 DIAGNOSIS — M54.32 BILATERAL SCIATICA: Primary | ICD-10-CM

## 2017-05-16 DIAGNOSIS — M1A.09X1 IDIOPATHIC CHRONIC GOUT OF MULTIPLE SITES WITH TOPHUS: Chronic | ICD-10-CM

## 2017-05-16 LAB
BASOPHILS # BLD AUTO: 0.03 K/UL
BASOPHILS NFR BLD: 0.2 %
CRP SERPL-MCNC: 11.9 MG/L
DIFFERENTIAL METHOD: ABNORMAL
EOSINOPHIL # BLD AUTO: 0.2 K/UL
EOSINOPHIL NFR BLD: 1.2 %
ERYTHROCYTE [DISTWIDTH] IN BLOOD BY AUTOMATED COUNT: 15 %
ERYTHROCYTE [SEDIMENTATION RATE] IN BLOOD BY WESTERGREN METHOD: 10 MM/HR
HCT VFR BLD AUTO: 44.6 %
HGB BLD-MCNC: 15 G/DL
LYMPHOCYTES # BLD AUTO: 2 K/UL
LYMPHOCYTES NFR BLD: 16.6 %
MCH RBC QN AUTO: 29.2 PG
MCHC RBC AUTO-ENTMCNC: 33.6 %
MCV RBC AUTO: 87 FL
MONOCYTES # BLD AUTO: 0.6 K/UL
MONOCYTES NFR BLD: 5.2 %
NEUTROPHILS # BLD AUTO: 9.3 K/UL
NEUTROPHILS NFR BLD: 76.8 %
PLATELET # BLD AUTO: 436 K/UL
PMV BLD AUTO: 9.9 FL
RBC # BLD AUTO: 5.13 M/UL
WBC # BLD AUTO: 12.14 K/UL

## 2017-05-16 PROCEDURE — 85025 COMPLETE CBC W/AUTO DIFF WBC: CPT

## 2017-05-16 PROCEDURE — 85651 RBC SED RATE NONAUTOMATED: CPT

## 2017-05-16 PROCEDURE — 86140 C-REACTIVE PROTEIN: CPT

## 2017-05-16 PROCEDURE — 36415 COLL VENOUS BLD VENIPUNCTURE: CPT

## 2017-05-16 NOTE — TELEPHONE ENCOUNTER
----- Message from Larry Zepeda MD sent at 5/16/2017  2:18 PM CDT -----  Labs show significant improvement in disease activity.

## 2017-05-16 NOTE — TELEPHONE ENCOUNTER
Advised patient of results, pt verbalized understanding. Pt states that the bottom of his feet are still hurting. States he did not get the Uloric from the pharmacy due to the cost.

## 2017-05-16 NOTE — TELEPHONE ENCOUNTER
----- Message from Sadie East sent at 5/16/2017  4:26 PM CDT -----  Contact: pt   .Pt was returning nurse call    .. [phone]

## 2017-05-17 NOTE — TELEPHONE ENCOUNTER
The prednisone is helping with the disease. It should improve in the next couple of weeks. Thanks.

## 2017-05-18 ENCOUNTER — HOSPITAL ENCOUNTER (OUTPATIENT)
Dept: RADIOLOGY | Facility: HOSPITAL | Age: 77
Discharge: HOME | End: 2017-05-18
Attending: ANESTHESIOLOGY
Payer: MEDICARE

## 2017-05-18 ENCOUNTER — OFFICE VISIT (OUTPATIENT)
Dept: PAIN MEDICINE | Facility: CLINIC | Age: 77
End: 2017-05-18
Payer: MEDICARE

## 2017-05-18 VITALS
WEIGHT: 224 LBS | HEIGHT: 73 IN | DIASTOLIC BLOOD PRESSURE: 79 MMHG | RESPIRATION RATE: 18 BRPM | SYSTOLIC BLOOD PRESSURE: 134 MMHG | HEART RATE: 71 BPM | BODY MASS INDEX: 29.69 KG/M2

## 2017-05-18 DIAGNOSIS — M47.812 SPONDYLOSIS OF CERVICAL REGION WITHOUT MYELOPATHY OR RADICULOPATHY: ICD-10-CM

## 2017-05-18 DIAGNOSIS — M47.817 SPONDYLOSIS OF LUMBOSACRAL REGION WITHOUT MYELOPATHY OR RADICULOPATHY: ICD-10-CM

## 2017-05-18 DIAGNOSIS — M46.1 SACROILIITIS, NOT ELSEWHERE CLASSIFIED: Primary | ICD-10-CM

## 2017-05-18 DIAGNOSIS — M46.1 SACROILIITIS: Primary | ICD-10-CM

## 2017-05-18 PROCEDURE — 99999 PR PBB SHADOW E&M-EST. PATIENT-LVL III: CPT | Mod: PBBFAC,,, | Performed by: ANESTHESIOLOGY

## 2017-05-18 PROCEDURE — 72052 X-RAY EXAM NECK SPINE 6/>VWS: CPT | Mod: TC

## 2017-05-18 PROCEDURE — 3078F DIAST BP <80 MM HG: CPT | Mod: S$GLB,,, | Performed by: ANESTHESIOLOGY

## 2017-05-18 PROCEDURE — 3075F SYST BP GE 130 - 139MM HG: CPT | Mod: S$GLB,,, | Performed by: ANESTHESIOLOGY

## 2017-05-18 PROCEDURE — 1160F RVW MEDS BY RX/DR IN RCRD: CPT | Mod: S$GLB,,, | Performed by: ANESTHESIOLOGY

## 2017-05-18 PROCEDURE — 1125F AMNT PAIN NOTED PAIN PRSNT: CPT | Mod: S$GLB,,, | Performed by: ANESTHESIOLOGY

## 2017-05-18 PROCEDURE — 1157F ADVNC CARE PLAN IN RCRD: CPT | Mod: S$GLB,,, | Performed by: ANESTHESIOLOGY

## 2017-05-18 PROCEDURE — 72114 X-RAY EXAM L-S SPINE BENDING: CPT | Mod: 26,,, | Performed by: RADIOLOGY

## 2017-05-18 PROCEDURE — 72052 X-RAY EXAM NECK SPINE 6/>VWS: CPT | Mod: 26,,, | Performed by: RADIOLOGY

## 2017-05-18 PROCEDURE — 99203 OFFICE O/P NEW LOW 30 MIN: CPT | Mod: S$GLB,,, | Performed by: ANESTHESIOLOGY

## 2017-05-18 PROCEDURE — 1159F MED LIST DOCD IN RCRD: CPT | Mod: S$GLB,,, | Performed by: ANESTHESIOLOGY

## 2017-05-18 PROCEDURE — 72114 X-RAY EXAM L-S SPINE BENDING: CPT | Mod: TC

## 2017-05-18 PROCEDURE — 99499 UNLISTED E&M SERVICE: CPT | Mod: S$GLB,,, | Performed by: ANESTHESIOLOGY

## 2017-05-18 NOTE — LETTER
May 18, 2017      Larry Zepeda MD  [address] [facility]  [place] [address]           [facility] - Interventional [facility]  [address] [place] [address]  [place] [address]  Phone: [phone]  Fax: [phone]          Patient: Pablito Givens Jr.   MR Number: [id number]   YOB: 1940   Date of Visit: 5/18/2017       Dear Dr. Larry Zepeda:    Thank you for referring Pablito Givens to me for evaluation. Attached you will find relevant portions of my assessment and plan of care.    If you have questions, please do not hesitate to call me. I look forward to following Pablito Givens along with you.    Sincerely,    Jovanny Everett MD    Enclosure  CC:  No Recipients    If you would like to receive this communication electronically, please contact [email or web address] or [phone] to request more information on [employer] Link access.    For providers and/or their staff who would like to refer a patient to [facility], please contact us through our one-stop-shop provider referral line, [facility], at [phone].    If you feel you have received this communication in error or would no longer like to receive these types of communications, please e-mail [email or web address]

## 2017-05-18 NOTE — TELEPHONE ENCOUNTER
Spoke with pt and advised him of below, pt states that during his visit he had mentioned to you that his sciatic nerve on the right side was bothering him and now he states that it is really hurting him and he is having a hard time getting around. Would like to know what could be done for this and if needed he can come in for a visit. Please Advise.

## 2017-05-18 NOTE — TELEPHONE ENCOUNTER
Spoke with pt and made an apt with Dr. Everett for today at 5.18.17 at 11.20 am. Pt verbalized understanding,

## 2017-05-18 NOTE — PROGRESS NOTES
Chief Pain Complaint:  Lower back pain, right leg pain, neck pain    History of Present Illness:   This patient is a 77 y.o. male who presents today complaining of the above noted pain/s. The patient describes the pain as follows.    - duration of pain: 2 years   - timing: constant   - character: aching, sharp  - radiating, dermatomal: extends into right leg along S1, cervical pain is nonradiating  - antecedent trauma, prior spinal surgery: no prior trauma, no prior spinal surgery   - pertinent negatives: No fever, No chills, No weight loss, No bladder dysfunction, No bowel dysfunction, No saddle anesthesia  - pertinent positives: right leg weakness    - medications, other therapies tried (physical therapy, injections):     >> Tylenol    >> Has NOT previously undergone Physical Therapy    >> Has NOT previously undergone spinal injection/s      Imaging / Labs / Studies (reviewed on 5/18/2017):      Results for orders placed in visit on 03/23/12   X-Ray Lumbar Spine Complete 5 View    Narrative DATE OF EXAM: Apr 12 2012   Findings: Spinal alignment is anatomic.  There is prominent bridging   multilevel degenerative vertebral endplate spurring with mild relative   narrowing of the L5-S1 disk space.  Pars inter-articularis defects are   equivocally identified at L5.  Pedicles are intact.  There is no   compression fracture or subluxation.         Review of Systems:  CONSTITUTIONAL: patient denies any fever, chills, or weight loss  SKIN: patient denies any rash or itching  RESPIRATORY: patient reports dyspnea with exertion  GASTROINTESTINAL: patient reports constipation  GENITOURINARY: patient denies having any abnormal bladder function    MUSCULOSKELETAL:  - patient complains of the above noted pain/s (see chief pain complaint)    NEUROLOGICAL:   - pain as above  - strength in Lower extremities is decreased, on the RIGHT  - sensation in Lower extremities is abnormal, on the RIGHT  - patient denies any loss of bowel or  "bladder control      PSYCHIATRIC: patient denies any change in mood    Other:  All other systems reviewed and are negative      Physical Exam:  /79 (BP Location: Right arm, Patient Position: Sitting, BP Method: Automatic)  Pulse 71  Resp 18  Ht 6' 1" (1.854 m)  Wt 101.6 kg (224 lb)  BMI 29.55 kg/m2 (reviewed on 5/18/2017)  General: alert and oriented, in no apparent distress  Gait: normal gait  Skin: No rashes, No discoloration, No obvious lesions  HEENT: EOMI  Cardiovascular: no significant peripheral edema present  Respiratory: respirations nonlabored    Musculoskeletal:  - Any pain on flexion, extension, rotation:    >> pain on extension and rotation  - Straight Leg Raise:     >> LEFT :: negative    >> RIGHT :: negative    - Any tenderness to palpation across paraspinal muscles, joints, bursae:     >> across lumbar paraspinals < right SI joint  - MELIDA positive on the right    Neuro:  - Extremity Strength:     >> LEFT :: 5/5    >> RIGHT :: 5/5  - Extremity Reflexes:    >> LEFT  :: 2+    >> RIGHT :: 2+     Psych:  Mood and affect is appropriate      Assessment:  Sacroiliitis  Lumbar Spondylosis  Cervical spondylosis    Plan:  Patient has cervical facet pain and right SI pain.  I will order right SI injection, check cervical and lumbar xr.  He wants to avoid medications.  Imaging / studies reviewed, detailed above.  I discussed in detail the risks, benefits, and alternatives to any and all potential treatment options.  All questions and concerns were fully addressed today in clinic.      Disclaimer:  This note may have been prepared using voice recognition software, it may have not been extensively proofed, as such there could be errors within the text such as sound alike errors.           "

## 2017-05-26 ENCOUNTER — HOSPITAL ENCOUNTER (OUTPATIENT)
Facility: HOSPITAL | Age: 77
Discharge: HOME | End: 2017-05-26
Attending: ANESTHESIOLOGY | Admitting: ANESTHESIOLOGY
Payer: MEDICARE

## 2017-05-26 ENCOUNTER — HOSPITAL ENCOUNTER (OUTPATIENT)
Dept: RADIOLOGY | Facility: HOSPITAL | Age: 77
Discharge: HOME | End: 2017-05-26
Attending: ANESTHESIOLOGY | Admitting: ANESTHESIOLOGY
Payer: MEDICARE

## 2017-05-26 ENCOUNTER — SURGERY (OUTPATIENT)
Age: 77
End: 2017-05-26

## 2017-05-26 VITALS
HEIGHT: 73 IN | RESPIRATION RATE: 17 BRPM | WEIGHT: 203 LBS | SYSTOLIC BLOOD PRESSURE: 155 MMHG | DIASTOLIC BLOOD PRESSURE: 76 MMHG | HEART RATE: 76 BPM | BODY MASS INDEX: 26.9 KG/M2 | OXYGEN SATURATION: 97 % | TEMPERATURE: 98 F

## 2017-05-26 DIAGNOSIS — M46.1 SACROILIITIS: Primary | ICD-10-CM

## 2017-05-26 DIAGNOSIS — M46.1 SACROILIITIS: ICD-10-CM

## 2017-05-26 PROCEDURE — 63600175 PHARM REV CODE 636 W HCPCS: Performed by: ANESTHESIOLOGY

## 2017-05-26 PROCEDURE — 25000003 PHARM REV CODE 250: Performed by: ANESTHESIOLOGY

## 2017-05-26 PROCEDURE — 27096 INJECT SACROILIAC JOINT: CPT | Mod: TC

## 2017-05-26 PROCEDURE — 63600175 PHARM REV CODE 636 W HCPCS

## 2017-05-26 PROCEDURE — 25000003 PHARM REV CODE 250

## 2017-05-26 PROCEDURE — 27096 INJECT SACROILIAC JOINT: CPT | Mod: RT,,, | Performed by: ANESTHESIOLOGY

## 2017-05-26 RX ORDER — DIAZEPAM 5 MG/1
5 TABLET ORAL EVERY 6 HOURS PRN
Status: DISCONTINUED | OUTPATIENT
Start: 2017-05-26 | End: 2017-05-26 | Stop reason: HOSPADM

## 2017-05-26 RX ORDER — METHYLPREDNISOLONE ACETATE 40 MG/ML
INJECTION, SUSPENSION INTRA-ARTICULAR; INTRALESIONAL; INTRAMUSCULAR; SOFT TISSUE
Status: DISCONTINUED | OUTPATIENT
Start: 2017-05-26 | End: 2017-05-26 | Stop reason: HOSPADM

## 2017-05-26 RX ORDER — LIDOCAINE HYDROCHLORIDE 10 MG/ML
INJECTION, SOLUTION EPIDURAL; INFILTRATION; INTRACAUDAL; PERINEURAL
Status: DISCONTINUED | OUTPATIENT
Start: 2017-05-26 | End: 2017-05-26 | Stop reason: HOSPADM

## 2017-05-26 RX ADMIN — LIDOCAINE HYDROCHLORIDE 5 ML: 10 INJECTION, SOLUTION EPIDURAL; INFILTRATION; INTRACAUDAL; PERINEURAL at 11:05

## 2017-05-26 RX ADMIN — DIAZEPAM 5 MG: 5 TABLET ORAL at 11:05

## 2017-05-26 RX ADMIN — METHYLPREDNISOLONE ACETATE 80 MG: 40 INJECTION, SUSPENSION INTRA-ARTICULAR; INTRALESIONAL; INTRAMUSCULAR; SOFT TISSUE at 11:05

## 2017-05-26 NOTE — OP NOTE
PROCEDURE: Sacroiliac joint injection under fluoroscopic guidance     SIDE: right      PROCEDURE DATE: 5/26/2017    PREOPERATIVE DIAGNOSIS: Sacroiliitis  POSTOPERATIVE DIAGNOSIS: Sacroiliitis    PROVIDER: Jovanny Everett MD  Assistant(s): None    ANESTHESIA: Local, Diazepam 5 mg po    >> 0 mg of VERSED    >> 0 mcg of FENTANYL     INDICATION: The patient has a history of pain due to sacroiliitis unresponsive to conservative treatments. Fluoroscopy was used to optimize visualization of needle placement and to maximize safety.     PROCEDURE DESCRIPTION: The patient was seen and identified in the preoperative area. Risks, benefits, complications, and alternatives were discussed with the patient. The patient agreed to proceed with the procedure and signed the consent. The site and side of the procedure was identified and marked. An IV was not placed for this procedure.     The patient was taken to the procedural suite. The patient was positioned in prone orientation on procedure table. A time out was performed prior to any intervention. The procedure, site, side, and allergies were stated and agreed to by all present. The lumbosacral area was widely prepped with ChloraPrep. The procedural site was draped in usual sterile fashion. Vital signs were closely monitored throughout this procedure. Conscious sedation was used for this procedure to decrease patient anxiety.    The fluoroscopic camera was placed in contralateral oblique view and was adjusted until the anterior and posterior joint margins of the targeted sacroiliac joint aligned in linear array. The lower pole of the joint was identified, marked, and localized with 1% Lidocaine. A 25 gauge 3.5 inch spinal needle was introduced and advanced to the joint under fluoroscopic guidance. The joint space was entered and after negative aspiration, 2 mL of injectate was posited into the joint space. The needle was then withdrawn outside of the joint space and after negative  aspiration 1 mL of solution was injected outside of the joint space. The injectant solution used was comprised of 2 mL of 1% PF Lidocaine and 2 mL of Methylprednisolone (40 mg/mL). This techniques was performed for the above noted joint/s.    Description of Findings: Not applicable    Prosthetic devices, grafts, tissues, or devices implanted: None    Specimen Removed: No    Estimated Blood Loss: minimal    COMPLICATIONS: None    DISPOSITION / PLANS: The patient was transferred to the recovery area in a stable condition for observation. The patient was reexamined prior to discharge. There was no evidence of acute neurologic injury following the procedure.  Patient was discharged from the recovery room after meeting discharge criteria. Home discharge instructions were given to the patient by the staff.

## 2017-05-26 NOTE — DISCHARGE SUMMARY
[facility]  Discharge Note       Description of Procedure: Sacroiliac Joint Injection under Fluoroscopic Guidance    Procedure Date: 5/26/2017    Admit Date: 5/26/2017  Discharge Date: 5/26/2017     Attending Physician: Jovanny Everett   Discharge Provider: Jovanny Everett    Preoperative Diagnosis:   Active Hospital Problems    Diagnosis  POA    Sacroiliitis [M46.1]  Yes     Priority: High      Resolved Hospital Problems    Diagnosis Date Resolved POA   No resolved problems to display.        Postoperative Diagnosis: as above, same as preoperative diagnosis    Discharged Condition: Stable    Hospital Course: Patient was admitted for an outpatient procedure. The procedure was tolerated well with no complications.    Final Diagnoses: Same as principal problem.    Disposition: Home, self-care.    Follow up/Patient Instructions:  Follow-up in clinic in 2-3 weeks.    Medications: No medications were prescribed today. The patient was advised to resume normal medication regimen without change.  Specific information was provided regarding restarting any anticoagulant/s.    Discharge Procedure Orders (must include Diet, Follow-up, Activity):  Light activity for the remainder of the day, resume normal activity tomorrow. Resume normal diet. Follow-up in clinic in 2-3 weeks.

## 2017-05-26 NOTE — PLAN OF CARE
Problem: Patient Care Overview  Goal: Plan of Care Review  Outcome: Outcome(s) achieved Date Met: 05/26/17  Patient d/c home in stable condition via wheelchair with ride. Verbalized understanding of d/c instructions. Patient voiced no complaints at this time. Patient stood at side of bed, walked steps with no new motor deficits. Neurologically intact.

## 2017-06-08 ENCOUNTER — OFFICE VISIT (OUTPATIENT)
Dept: INTERNAL MEDICINE | Facility: CLINIC | Age: 77
End: 2017-06-08
Payer: MEDICARE

## 2017-06-08 ENCOUNTER — LAB VISIT (OUTPATIENT)
Dept: LAB | Facility: HOSPITAL | Age: 77
End: 2017-06-08
Attending: FAMILY MEDICINE
Payer: MEDICARE

## 2017-06-08 VITALS
TEMPERATURE: 97 F | SYSTOLIC BLOOD PRESSURE: 130 MMHG | BODY MASS INDEX: 29.25 KG/M2 | WEIGHT: 220.69 LBS | DIASTOLIC BLOOD PRESSURE: 70 MMHG | HEIGHT: 73 IN

## 2017-06-08 DIAGNOSIS — M1A.09X1 IDIOPATHIC CHRONIC GOUT OF MULTIPLE SITES WITH TOPHUS: Chronic | ICD-10-CM

## 2017-06-08 DIAGNOSIS — R73.03 PREDIABETES: Chronic | ICD-10-CM

## 2017-06-08 DIAGNOSIS — E55.9 VITAMIN D INSUFFICIENCY: ICD-10-CM

## 2017-06-08 DIAGNOSIS — I10 ESSENTIAL HYPERTENSION: Primary | Chronic | ICD-10-CM

## 2017-06-08 DIAGNOSIS — N18.30 STAGE 3 CHRONIC KIDNEY DISEASE: Chronic | ICD-10-CM

## 2017-06-08 DIAGNOSIS — I10 ESSENTIAL HYPERTENSION: Chronic | ICD-10-CM

## 2017-06-08 PROBLEM — M10.071 ACUTE IDIOPATHIC GOUT OF RIGHT ANKLE: Status: RESOLVED | Noted: 2017-05-09 | Resolved: 2017-06-08

## 2017-06-08 LAB
ALBUMIN SERPL BCP-MCNC: 3.8 G/DL
ALP SERPL-CCNC: 63 U/L
ALT SERPL W/O P-5'-P-CCNC: 22 U/L
ANION GAP SERPL CALC-SCNC: 12 MMOL/L
AST SERPL-CCNC: 15 U/L
BILIRUB SERPL-MCNC: 0.3 MG/DL
BUN SERPL-MCNC: 40 MG/DL
CALCIUM SERPL-MCNC: 10.4 MG/DL
CHLORIDE SERPL-SCNC: 103 MMOL/L
CO2 SERPL-SCNC: 24 MMOL/L
CREAT SERPL-MCNC: 1.6 MG/DL
EST. GFR  (AFRICAN AMERICAN): 48 ML/MIN/1.73 M^2
EST. GFR  (NON AFRICAN AMERICAN): 41.5 ML/MIN/1.73 M^2
GLUCOSE SERPL-MCNC: 93 MG/DL
POTASSIUM SERPL-SCNC: 4.4 MMOL/L
PROT SERPL-MCNC: 7 G/DL
SODIUM SERPL-SCNC: 139 MMOL/L

## 2017-06-08 PROCEDURE — 36415 COLL VENOUS BLD VENIPUNCTURE: CPT | Mod: PO

## 2017-06-08 PROCEDURE — 1159F MED LIST DOCD IN RCRD: CPT | Mod: S$GLB,,, | Performed by: FAMILY MEDICINE

## 2017-06-08 PROCEDURE — 80053 COMPREHEN METABOLIC PANEL: CPT

## 2017-06-08 PROCEDURE — 1157F ADVNC CARE PLAN IN RCRD: CPT | Mod: S$GLB,,, | Performed by: FAMILY MEDICINE

## 2017-06-08 PROCEDURE — 99214 OFFICE O/P EST MOD 30 MIN: CPT | Mod: S$GLB,,, | Performed by: FAMILY MEDICINE

## 2017-06-08 PROCEDURE — 99999 PR PBB SHADOW E&M-EST. PATIENT-LVL III: CPT | Mod: PBBFAC,,, | Performed by: FAMILY MEDICINE

## 2017-06-08 PROCEDURE — 83036 HEMOGLOBIN GLYCOSYLATED A1C: CPT

## 2017-06-08 PROCEDURE — 1126F AMNT PAIN NOTED NONE PRSNT: CPT | Mod: S$GLB,,, | Performed by: FAMILY MEDICINE

## 2017-06-08 PROCEDURE — 99499 UNLISTED E&M SERVICE: CPT | Mod: S$GLB,,, | Performed by: FAMILY MEDICINE

## 2017-06-08 RX ORDER — LOSARTAN POTASSIUM 100 MG/1
100 TABLET ORAL DAILY
Qty: 90 TABLET | Refills: 3 | Status: SHIPPED | OUTPATIENT
Start: 2017-06-08 | End: 2017-07-13

## 2017-06-08 RX ORDER — ERGOCALCIFEROL 1.25 MG/1
50000 CAPSULE ORAL
Qty: 12 CAPSULE | Refills: 0 | Status: SHIPPED | OUTPATIENT
Start: 2017-06-08 | End: 2017-07-13

## 2017-06-08 NOTE — PROGRESS NOTES
Subjective:   Patient ID: Pablito Givens Jr. is a 77 y.o. male.  Chief Complaint:  Follow-up      Follow-up on multiple chronic medical conditions.  Prediabetes.  Last A1c 6.1%.  No metformin started.  Needs repeat testing today.  Denies symptoms of hypo-or hyperglycemia.  Hypertension controlled on lisinopril and hydrochlorothiazide, however discontinuation of both with change to losartan recommended to help control chronic gout.  Vitamin D insufficiency.  On weekly high-dose supplement.  Do repeat testing in August.  Chronic recurrent gout.  Saw rheumatology.  On slow dose steroid taper.  Has not started/filled U Lorick is previously advised.  No pain or recent exacerbation reported.  Previously stable chronic kidney disease.  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  Denies any urinary difficulty.  Persistent sacroiliitis/low back pain.  Reports failed injection by pain management.  Manipulation by chiropractor helped.  No new or additional concerns today.      Review of Systems   Constitutional: Negative for chills, fatigue and fever.   Eyes: Negative for visual disturbance.   Respiratory: Negative for cough, chest tightness, shortness of breath and wheezing.    Cardiovascular: Negative for chest pain, palpitations and leg swelling.   Gastrointestinal: Negative for abdominal pain, constipation, diarrhea, nausea and vomiting.   Endocrine: Negative for polydipsia, polyphagia and polyuria.   Musculoskeletal: Positive for myalgias. Negative for neck pain.   Skin: Negative for rash.   Neurological: Negative for dizziness, syncope, weakness, light-headedness, numbness and headaches.   Psychiatric/Behavioral: Positive for sleep disturbance. The patient is not nervous/anxious.        Current Outpatient Prescriptions:     aspirin 81 MG Chew, Take 81 mg by mouth once daily., Disp: , Rfl:     ergocalciferol (ERGOCALCIFEROL) 50,000 unit Cap, Take 1 capsule (50,000 Units total) by mouth every 7 days., Disp: 12  "capsule, Rfl: 0    predniSONE (DELTASONE) 10 MG tablet, Take 20 mg once daily for one week, then 15 mg daily next week, then 10 mg daily next week and then 5 mg daily thereafter. (Patient taking differently: 5 mg. ), Disp: 60 tablet, Rfl: 1    zolpidem (AMBIEN) 5 MG Tab, TAKE ONE TABLET BY MOUTH IN THE EVENING, Disp: 90 tablet, Rfl: 0    acetaminophen (TYLENOL) 650 MG TbSR, Take 1 tablet (650 mg total) by mouth every 8 (eight) hours., Disp: , Rfl: 0    febuxostat (ULORIC) 40 mg Tab, Take 1 tablet (40 mg total) by mouth once daily., Disp: 30 tablet, Rfl: 3    losartan (COZAAR) 100 MG tablet, Take 1 tablet (100 mg total) by mouth once daily., Disp: 90 tablet, Rfl: 3    Objective:   /70 (BP Location: Right arm, Patient Position: Sitting, BP Method: Manual)   Temp 97.4 °F (36.3 °C) (Tympanic)   Ht 6' 1" (1.854 m)   Wt 100.1 kg (220 lb 10.9 oz)   BMI 29.12 kg/m²     Physical Exam   Constitutional: Vital signs are normal. He appears well-developed and well-nourished.   Eyes: Conjunctivae are normal. Right eye exhibits no discharge. Left eye exhibits no discharge. No scleral icterus.   Neck: No JVD present.   Cardiovascular: Normal rate, regular rhythm and normal heart sounds.  Exam reveals no gallop and no friction rub.    No murmur heard.  Pulmonary/Chest: Effort normal and breath sounds normal. He has no wheezes. He has no rhonchi. He has no rales.   Abdominal: Soft. He exhibits no distension. There is no tenderness. There is no rebound and no guarding.   Musculoskeletal: He exhibits no edema.   Skin: Skin is warm and dry. No rash noted.   Psychiatric: He has a normal mood and affect.   Nursing note and vitals reviewed.    Assessment:     1. Essential hypertension    2. Prediabetes    3. Vitamin D insufficiency    4. Idiopathic chronic gout of multiple sites with tophus    5. Stage 3 chronic kidney disease      Plan:   Essential hypertension/Stage 3 chronic kidney disease  -     losartan (COZAAR) 100 MG " tablet; Take 1 tablet (100 mg total) by mouth once daily.  Dispense: 90 tablet; Refill: 3  -     Comprehensive metabolic panel; Future; Expected date: 06/08/2017  Stop lisinopril.  Stop HCTZ.  Start losartan.  Recheck CMP 1 month.    Prediabetes  -     Hemoglobin A1c; Future; Expected date: 06/08/2017  Check A1c.  Start medication as indicated.    Vitamin D insufficiency  -     ergocalciferol (ERGOCALCIFEROL) 50,000 unit Cap; Take 1 capsule (50,000 Units total) by mouth every 7 days.  Dispense: 12 capsule; Refill: 0  Continue weekly high-dose supplement.  Recheck vitamin D level in August.    Idiopathic chronic gout of multiple sites with tophus  Finish prednisone taper.  Encouraged to start Uloric is advised by rheumatology.  Follow-up with rheumatology in July as scheduled.    Keep all specialist appointments as scheduled  RTC 1 months.

## 2017-06-09 LAB
ESTIMATED AVG GLUCOSE: 163 MG/DL
HBA1C MFR BLD HPLC: 7.3 %

## 2017-06-13 ENCOUNTER — TELEPHONE (OUTPATIENT)
Dept: INTERNAL MEDICINE | Facility: CLINIC | Age: 77
End: 2017-06-13

## 2017-06-13 NOTE — TELEPHONE ENCOUNTER
----- Message from Christiano Nieto MD sent at 6/9/2017 11:57 AM CDT -----  A1c now in diabetic range.  We'll need to start medication.  Discussed at 1 month follow-up.  Kidney and liver testing stable.  Obtain blood pressure medicine as planned.  Recheck 1 month.

## 2017-06-19 ENCOUNTER — TELEPHONE (OUTPATIENT)
Dept: RHEUMATOLOGY | Facility: CLINIC | Age: 77
End: 2017-06-19

## 2017-06-19 DIAGNOSIS — F51.04 CHRONIC INSOMNIA: ICD-10-CM

## 2017-06-19 RX ORDER — ZOLPIDEM TARTRATE 5 MG/1
TABLET ORAL
Qty: 90 TABLET | Refills: 0 | Status: SHIPPED | OUTPATIENT
Start: 2017-06-19 | End: 2017-09-11

## 2017-06-19 NOTE — TELEPHONE ENCOUNTER
----- Message from Lilly Foley PA-C sent at 6/19/2017  4:10 PM CDT -----  Contact: [phone] or [phone]  Looks like the prednisone is for gout flare. Is that what he is having? Ok to go up to 15mg for 3-4 days, then taper back down. See if that helps.     ----- Message -----  From: Luz Manzanares LPN  Sent: 6/19/2017   4:01 PM  To: Lilly Foley PA-C    Pt wrist and foot is hurting  Pain level 6 pt started on prednisone and was to taper it he started on 15mg then down to 10 then 5mg he has up himself to 10mg. He stated he wanted to go up to 15 mg because of pain. Pt only has one kidney and they are worried about his kidney, please advise thanks  ----- Message -----  From: Mook Roberts  Sent: 6/19/2017  10:48 AM  To: [facility] Staff    Pt would like to consult the nurse about Rx medication Prednisone and Gout.  Please call back [phone] or [phone].  Thanks-[facility]

## 2017-07-10 ENCOUNTER — LAB VISIT (OUTPATIENT)
Dept: LAB | Facility: HOSPITAL | Age: 77
End: 2017-07-10
Attending: INTERNAL MEDICINE
Payer: MEDICARE

## 2017-07-10 DIAGNOSIS — M1A.09X1 IDIOPATHIC CHRONIC GOUT OF MULTIPLE SITES WITH TOPHUS: Chronic | ICD-10-CM

## 2017-07-10 DIAGNOSIS — M10.071 ACUTE IDIOPATHIC GOUT OF RIGHT ANKLE: ICD-10-CM

## 2017-07-10 LAB
BASOPHILS # BLD AUTO: 0.03 K/UL
BASOPHILS NFR BLD: 0.3 %
CRP SERPL-MCNC: 5.3 MG/L
DIFFERENTIAL METHOD: ABNORMAL
EOSINOPHIL # BLD AUTO: 0.2 K/UL
EOSINOPHIL NFR BLD: 1.9 %
ERYTHROCYTE [DISTWIDTH] IN BLOOD BY AUTOMATED COUNT: 16 %
ERYTHROCYTE [SEDIMENTATION RATE] IN BLOOD BY WESTERGREN METHOD: 8 MM/HR
HCT VFR BLD AUTO: 45 %
HGB BLD-MCNC: 14.8 G/DL
LYMPHOCYTES # BLD AUTO: 1.2 K/UL
LYMPHOCYTES NFR BLD: 12.9 %
MCH RBC QN AUTO: 29.2 PG
MCHC RBC AUTO-ENTMCNC: 32.9 %
MCV RBC AUTO: 89 FL
MONOCYTES # BLD AUTO: 0.8 K/UL
MONOCYTES NFR BLD: 9.1 %
NEUTROPHILS # BLD AUTO: 6.8 K/UL
NEUTROPHILS NFR BLD: 75.8 %
PLATELET # BLD AUTO: 336 K/UL
PMV BLD AUTO: 9.9 FL
RBC # BLD AUTO: 5.06 M/UL
WBC # BLD AUTO: 9.01 K/UL

## 2017-07-10 PROCEDURE — 85025 COMPLETE CBC W/AUTO DIFF WBC: CPT

## 2017-07-10 PROCEDURE — 86140 C-REACTIVE PROTEIN: CPT

## 2017-07-10 PROCEDURE — 36415 COLL VENOUS BLD VENIPUNCTURE: CPT

## 2017-07-10 PROCEDURE — 85651 RBC SED RATE NONAUTOMATED: CPT

## 2017-07-12 ENCOUNTER — TELEPHONE (OUTPATIENT)
Dept: RHEUMATOLOGY | Facility: CLINIC | Age: 77
End: 2017-07-12

## 2017-07-13 ENCOUNTER — OFFICE VISIT (OUTPATIENT)
Dept: INTERNAL MEDICINE | Facility: CLINIC | Age: 77
End: 2017-07-13
Payer: MEDICARE

## 2017-07-13 ENCOUNTER — LAB VISIT (OUTPATIENT)
Dept: LAB | Facility: HOSPITAL | Age: 77
End: 2017-07-13
Attending: INTERNAL MEDICINE
Payer: MEDICARE

## 2017-07-13 ENCOUNTER — OFFICE VISIT (OUTPATIENT)
Dept: RHEUMATOLOGY | Facility: CLINIC | Age: 77
End: 2017-07-13
Payer: MEDICARE

## 2017-07-13 ENCOUNTER — HOSPITAL ENCOUNTER (OUTPATIENT)
Dept: RADIOLOGY | Facility: HOSPITAL | Age: 77
Discharge: HOME | End: 2017-07-13
Attending: INTERNAL MEDICINE
Payer: MEDICARE

## 2017-07-13 VITALS
SYSTOLIC BLOOD PRESSURE: 152 MMHG | HEART RATE: 69 BPM | HEIGHT: 73 IN | TEMPERATURE: 97 F | WEIGHT: 226.44 LBS | DIASTOLIC BLOOD PRESSURE: 92 MMHG | OXYGEN SATURATION: 96 % | BODY MASS INDEX: 30.01 KG/M2

## 2017-07-13 VITALS
SYSTOLIC BLOOD PRESSURE: 192 MMHG | HEART RATE: 80 BPM | DIASTOLIC BLOOD PRESSURE: 98 MMHG | BODY MASS INDEX: 29.93 KG/M2 | WEIGHT: 226.88 LBS

## 2017-07-13 DIAGNOSIS — M19.90 INFLAMMATORY ARTHRITIS: ICD-10-CM

## 2017-07-13 DIAGNOSIS — M1A.09X1 IDIOPATHIC CHRONIC GOUT OF MULTIPLE SITES WITH TOPHUS: Primary | ICD-10-CM

## 2017-07-13 DIAGNOSIS — N18.30 TYPE 2 DIABETES MELLITUS WITH STAGE 3 CHRONIC KIDNEY DISEASE, WITHOUT LONG-TERM CURRENT USE OF INSULIN: Chronic | ICD-10-CM

## 2017-07-13 DIAGNOSIS — E11.59 HYPERTENSION ASSOCIATED WITH DIABETES: Primary | ICD-10-CM

## 2017-07-13 DIAGNOSIS — E11.22 TYPE 2 DIABETES MELLITUS WITH STAGE 3 CHRONIC KIDNEY DISEASE, WITHOUT LONG-TERM CURRENT USE OF INSULIN: Chronic | ICD-10-CM

## 2017-07-13 DIAGNOSIS — E55.9 VITAMIN D INSUFFICIENCY: ICD-10-CM

## 2017-07-13 DIAGNOSIS — I15.2 HYPERTENSION ASSOCIATED WITH DIABETES: Primary | ICD-10-CM

## 2017-07-13 LAB
ANION GAP SERPL CALC-SCNC: 12 MMOL/L
BUN SERPL-MCNC: 23 MG/DL
CALCIUM SERPL-MCNC: 10 MG/DL
CHLORIDE SERPL-SCNC: 106 MMOL/L
CO2 SERPL-SCNC: 26 MMOL/L
CREAT SERPL-MCNC: 1.4 MG/DL
EST. GFR  (AFRICAN AMERICAN): 56.4 ML/MIN/1.73 M^2
EST. GFR  (NON AFRICAN AMERICAN): 48.8 ML/MIN/1.73 M^2
GLUCOSE SERPL-MCNC: 97 MG/DL
POTASSIUM SERPL-SCNC: 4.6 MMOL/L
SODIUM SERPL-SCNC: 144 MMOL/L

## 2017-07-13 PROCEDURE — 99999 PR PBB SHADOW E&M-EST. PATIENT-LVL III: CPT | Mod: PBBFAC,,, | Performed by: INTERNAL MEDICINE

## 2017-07-13 PROCEDURE — 1159F MED LIST DOCD IN RCRD: CPT | Mod: S$GLB,,, | Performed by: FAMILY MEDICINE

## 2017-07-13 PROCEDURE — 1157F ADVNC CARE PLAN IN RCRD: CPT | Mod: S$GLB,,, | Performed by: INTERNAL MEDICINE

## 2017-07-13 PROCEDURE — 99999 PR PBB SHADOW E&M-EST. PATIENT-LVL III: CPT | Mod: PBBFAC,,, | Performed by: FAMILY MEDICINE

## 2017-07-13 PROCEDURE — 1125F AMNT PAIN NOTED PAIN PRSNT: CPT | Mod: S$GLB,,, | Performed by: FAMILY MEDICINE

## 2017-07-13 PROCEDURE — 1125F AMNT PAIN NOTED PAIN PRSNT: CPT | Mod: S$GLB,,, | Performed by: INTERNAL MEDICINE

## 2017-07-13 PROCEDURE — 3045F PR MOST RECENT HEMOGLOBIN A1C LEVEL 7.0-9.0%: CPT | Mod: S$GLB,,, | Performed by: FAMILY MEDICINE

## 2017-07-13 PROCEDURE — 99499 UNLISTED E&M SERVICE: CPT | Mod: S$GLB,,, | Performed by: FAMILY MEDICINE

## 2017-07-13 PROCEDURE — 73130 X-RAY EXAM OF HAND: CPT | Mod: 50,TC,PO

## 2017-07-13 PROCEDURE — 80048 BASIC METABOLIC PNL TOTAL CA: CPT

## 2017-07-13 PROCEDURE — 36415 COLL VENOUS BLD VENIPUNCTURE: CPT | Mod: PO

## 2017-07-13 PROCEDURE — 3066F NEPHROPATHY DOC TX: CPT | Mod: S$GLB,,, | Performed by: FAMILY MEDICINE

## 2017-07-13 PROCEDURE — 99214 OFFICE O/P EST MOD 30 MIN: CPT | Mod: 25,S$GLB,, | Performed by: INTERNAL MEDICINE

## 2017-07-13 PROCEDURE — 99499 UNLISTED E&M SERVICE: CPT | Mod: S$GLB,,, | Performed by: INTERNAL MEDICINE

## 2017-07-13 PROCEDURE — 96372 THER/PROPH/DIAG INJ SC/IM: CPT | Mod: S$GLB,,, | Performed by: INTERNAL MEDICINE

## 2017-07-13 PROCEDURE — 73130 X-RAY EXAM OF HAND: CPT | Mod: 26,50,, | Performed by: RADIOLOGY

## 2017-07-13 PROCEDURE — 1159F MED LIST DOCD IN RCRD: CPT | Mod: S$GLB,,, | Performed by: INTERNAL MEDICINE

## 2017-07-13 PROCEDURE — 99214 OFFICE O/P EST MOD 30 MIN: CPT | Mod: S$GLB,,, | Performed by: FAMILY MEDICINE

## 2017-07-13 PROCEDURE — 1157F ADVNC CARE PLAN IN RCRD: CPT | Mod: S$GLB,,, | Performed by: FAMILY MEDICINE

## 2017-07-13 RX ORDER — FEBUXOSTAT 40 MG/1
40 TABLET, FILM COATED ORAL DAILY
Qty: 30 TABLET | Refills: 3 | Status: SHIPPED | OUTPATIENT
Start: 2017-07-13 | End: 2018-02-08 | Stop reason: SDUPTHER

## 2017-07-13 RX ORDER — PREDNISONE 10 MG/1
10 TABLET ORAL DAILY
Qty: 30 TABLET | Refills: 3 | Status: SHIPPED | OUTPATIENT
Start: 2017-07-13 | End: 2017-08-10 | Stop reason: SDUPTHER

## 2017-07-13 RX ORDER — GLIMEPIRIDE 1 MG/1
1 TABLET ORAL
Qty: 90 TABLET | Refills: 0 | Status: SHIPPED | OUTPATIENT
Start: 2017-07-13 | End: 2017-10-08 | Stop reason: SDUPTHER

## 2017-07-13 RX ORDER — BETAMETHASONE SODIUM PHOSPHATE AND BETAMETHASONE ACETATE 3; 3 MG/ML; MG/ML
6 INJECTION, SUSPENSION INTRA-ARTICULAR; INTRALESIONAL; INTRAMUSCULAR; SOFT TISSUE
Status: COMPLETED | OUTPATIENT
Start: 2017-07-13 | End: 2017-07-13

## 2017-07-13 RX ADMIN — BETAMETHASONE SODIUM PHOSPHATE AND BETAMETHASONE ACETATE 6 MG: 3; 3 INJECTION, SUSPENSION INTRA-ARTICULAR; INTRALESIONAL; INTRAMUSCULAR; SOFT TISSUE at 10:07

## 2017-07-13 NOTE — PROGRESS NOTES
Subjective:   Patient ID: Pablito Givens Jr. is a 77 y.o. male.  Chief Complaint:  Follow-up      Presents for one-month follow-up.  Last visit stopped ACE inhibitor.  Adequate thiazide.  Started Cozaar to better control uric acid levels.  Here for repeat blood pressure.  Unfortunately elevated and no longer controlled with change.  Previous creatinine 1.6.  Solitary kidney post nephrectomy.  Needs BMP checked to see if okay to remain on ARB.  A1c 7.3%.  Previous prediabetes.  Now also diabetes mellitus.  Needs medication.  Patient only agrees to generic medication.  Vitamin D insufficiency.  Taking weekly supplement since May.  Needs repeat level in August.  Saw rheumatology for gout.  On Uloric  Tapering prednisone.  No additional complaints concerns today.      Review of Systems   Constitutional: Positive for fatigue. Negative for chills and fever.   Eyes: Negative for visual disturbance.   Respiratory: Negative for cough, chest tightness, shortness of breath and wheezing.    Cardiovascular: Negative for chest pain, palpitations and leg swelling.   Gastrointestinal: Negative for abdominal pain, constipation, diarrhea, nausea and vomiting.   Endocrine: Negative for polydipsia, polyphagia and polyuria.   Musculoskeletal: Positive for arthralgias, back pain and joint swelling. Negative for gait problem, myalgias and neck pain.   Skin: Negative for rash.   Neurological: Negative for dizziness, syncope, weakness, light-headedness, numbness and headaches.   Psychiatric/Behavioral: Negative for sleep disturbance. The patient is not nervous/anxious.        Current Outpatient Prescriptions:     acetaminophen (TYLENOL) 650 MG TbSR, Take 1 tablet (650 mg total) by mouth every 8 (eight) hours., Disp: , Rfl: 0    aspirin 81 MG Chew, Take 81 mg by mouth once daily., Disp: , Rfl:     febuxostat (ULORIC) 40 mg Tab, Take 1 tablet (40 mg total) by mouth once daily., Disp: 30 tablet, Rfl: 3    predniSONE (DELTASONE) 10 MG  "tablet, Take 1 tablet (10 mg total) by mouth once daily., Disp: 30 tablet, Rfl: 3    zolpidem (AMBIEN) 5 MG Tab, TAKE ONE TABLET BY MOUTH ONCE DAILY IN THE EVENING, Disp: 90 tablet, Rfl: 0    glimepiride (AMARYL) 1 MG tablet, Take 1 tablet (1 mg total) by mouth daily with breakfast., Disp: 90 tablet, Rfl: 0  No current facility-administered medications for this visit.     Objective:   BP (!) 152/92 (BP Location: Right arm, Patient Position: Sitting, BP Method: Manual)   Pulse 69   Temp 96.9 °F (36.1 °C) (Tympanic)   Ht 6' 1" (1.854 m)   Wt 102.7 kg (226 lb 6.6 oz)   SpO2 96%   BMI 29.87 kg/m²     Physical Exam   Constitutional: Vital signs are normal. He appears well-developed and well-nourished.   Eyes: Conjunctivae are normal. Right eye exhibits no discharge. Left eye exhibits no discharge. No scleral icterus.   Neck: No JVD present.   Cardiovascular: Normal rate, regular rhythm and normal heart sounds.  Exam reveals no gallop and no friction rub.    No murmur heard.  Pulmonary/Chest: Effort normal and breath sounds normal. He has no wheezes. He has no rhonchi. He has no rales.   Abdominal: Soft. He exhibits no distension. There is no tenderness. There is no rebound and no guarding.   Musculoskeletal: He exhibits no edema.   Skin: Skin is warm and dry. No rash noted.   Psychiatric: He has a normal mood and affect.   Nursing note and vitals reviewed.    Assessment:     1. Hypertension associated with diabetes    2. Type 2 diabetes mellitus with stage 3 chronic kidney disease, without long-term current use of insulin    3. Vitamin D insufficiency      Plan:   Hypertension associated with diabetes  Check BMP.    If stable, astop losartan and change to Exforge for better blood pressure control    Type 2 diabetes mellitus with stage 3 chronic kidney disease, without long-term current use of insulin  -     Basic metabolic panel; Future; Expected date: 07/13/2017  -     Microalbumin/creatinine urine ratio; " Future; Expected date: 07/13/2017        -     glimepiride (AMARYL) 1 MG tablet; Take 1 tablet (1 mg total) by mouth daily with breakfast.  Dispense: 90 tablet; Refill: 0  Start Amaryl 1 mg daily    Vitamin D insufficiency  Continue weekly high-dose supplement.  Recheck vitamin D next visit.    Return to clinic 1 month

## 2017-07-13 NOTE — ASSESSMENT & PLAN NOTE
His presentation is not very typical for chronic tophaceous gout related chronic arthritis.  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  I will be able to differentiate between the toes as long as he is on prednisone therapy for gout prophylaxis.  Once we get better control the gout if he starts having persistent inflammatory arthritis I would consider starting disease modifying agents.

## 2017-07-13 NOTE — PROGRESS NOTES
Administered 1 cc Betamethasone 6mg/cc  to right ventrogluteal. Pt tolerated well. No acute reaction noted to site. Pt instructed on S/S to report. Advised patient to wait in lobby 15 minutes after receiving injection to monitor for any reactions. Pt verbalized understanding.     Lot: [id number]  Exp: 11/18

## 2017-07-13 NOTE — ASSESSMENT & PLAN NOTE
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  He had intolerance to allopurinol and due to progressively worsening chronic kidney disease he was given uloric in the last visit but due to some misunderstanding he haven't started it..  Prednisone is probably controlling the flareups.  Start uloric.  If any problem consider pegloticase infusion for tophaceous gout.
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
## 2017-07-14 ENCOUNTER — TELEPHONE (OUTPATIENT)
Dept: INTERNAL MEDICINE | Facility: CLINIC | Age: 77
End: 2017-07-14

## 2017-07-14 ENCOUNTER — PATIENT MESSAGE (OUTPATIENT)
Dept: RHEUMATOLOGY | Facility: CLINIC | Age: 77
End: 2017-07-14

## 2017-07-14 RX ORDER — VALSARTAN 320 MG/1
320 TABLET ORAL DAILY
Qty: 90 TABLET | Refills: 0 | Status: SHIPPED | OUTPATIENT
Start: 2017-07-14 | End: 2017-07-29 | Stop reason: SINTOL

## 2017-07-20 ENCOUNTER — DOCUMENTATION ONLY (OUTPATIENT)
Dept: RHEUMATOLOGY | Facility: CLINIC | Age: 77
End: 2017-07-20

## 2017-07-20 NOTE — PROGRESS NOTES
Patient approved until 12.31.17 for [facility] patient assistance program for [employer].

## 2017-07-29 ENCOUNTER — OFFICE VISIT (OUTPATIENT)
Dept: URGENT CARE | Facility: CLINIC | Age: 77
End: 2017-07-29
Payer: MEDICARE

## 2017-07-29 VITALS
DIASTOLIC BLOOD PRESSURE: 105 MMHG | HEIGHT: 73 IN | BODY MASS INDEX: 29.54 KG/M2 | WEIGHT: 222.88 LBS | SYSTOLIC BLOOD PRESSURE: 210 MMHG | TEMPERATURE: 98 F

## 2017-07-29 DIAGNOSIS — R42 DIZZINESS: ICD-10-CM

## 2017-07-29 DIAGNOSIS — M25.519 SHOULDER PAIN, UNSPECIFIED CHRONICITY, UNSPECIFIED LATERALITY: Primary | ICD-10-CM

## 2017-07-29 DIAGNOSIS — I10 ESSENTIAL HYPERTENSION: ICD-10-CM

## 2017-07-29 DIAGNOSIS — S61.411A LACERATION OF RIGHT HAND WITHOUT FOREIGN BODY, INITIAL ENCOUNTER: ICD-10-CM

## 2017-07-29 PROCEDURE — 99499 UNLISTED E&M SERVICE: CPT | Mod: S$GLB,,, | Performed by: FAMILY MEDICINE

## 2017-07-29 PROCEDURE — 1157F ADVNC CARE PLAN IN RCRD: CPT | Mod: S$GLB,,, | Performed by: FAMILY MEDICINE

## 2017-07-29 PROCEDURE — 99214 OFFICE O/P EST MOD 30 MIN: CPT | Mod: S$GLB,,, | Performed by: FAMILY MEDICINE

## 2017-07-29 PROCEDURE — 1125F AMNT PAIN NOTED PAIN PRSNT: CPT | Mod: S$GLB,,, | Performed by: FAMILY MEDICINE

## 2017-07-29 PROCEDURE — 99999 PR PBB SHADOW E&M-EST. PATIENT-LVL III: CPT | Mod: PBBFAC,,, | Performed by: FAMILY MEDICINE

## 2017-07-29 PROCEDURE — 1159F MED LIST DOCD IN RCRD: CPT | Mod: S$GLB,,, | Performed by: FAMILY MEDICINE

## 2017-07-29 RX ORDER — VERAPAMIL HYDROCHLORIDE 120 MG/1
120 CAPSULE, EXTENDED RELEASE ORAL DAILY
Qty: 30 CAPSULE | Refills: 3 | Status: SHIPPED | OUTPATIENT
Start: 2017-07-29 | End: 2017-07-30

## 2017-07-29 RX ORDER — LIDOCAINE 50 MG/G
1 PATCH TOPICAL DAILY
Qty: 10 PATCH | Refills: 0 | Status: SHIPPED | OUTPATIENT
Start: 2017-07-29 | End: 2017-08-01

## 2017-07-29 NOTE — PATIENT INSTRUCTIONS
"  Discharge Instructions for High Blood Pressure (Hypertension)  You have been diagnosed with high blood pressure (also called hypertension). This means the force of blood against your artery walls is too strong. It also means your heart is working hard to move blood. High blood pressure usually has no symptoms, but over time, it can damage your heart, blood vessels, eyes, kidneys, and other organs. With help from your doctor, you can manage your blood pressure and protect your health.  Taking medicine  · Learn to take your own blood pressure. Keep a record of your results. Ask your doctor which readings mean that you need medical attention.  · Take your blood pressure medicine exactly as directed. Dont skip doses. Missing doses can cause your blood pressure to get out of control.  · If you do miss a dose (or doses) check with your healthcare provider about what to do.  · Avoid medicine that contain heart stimulants, including over-the-counter drugs. Check for warnings about high blood pressure on the label. Ask the pharmacist before purchasing something you haven't used before  · Check with your doctor or pharmacist before taking a decongestant. Some decongestants can worsen high blood pressure.  Lifestyle changes  · Maintain a healthy weight. Get help to lose any extra pounds.  · Cut back on salt.  ¨ Limit canned, dried, packaged, and fast foods.  ¨ Dont add salt to your food at the table.  ¨ Season foods with herbs instead of salt when you cook.  ¨ Request no added salt when you go to a restaurant.  ¨ The American Heart Associations (AHA) "ideal" sodium intake recommendation is 1,500 milligrams per day.  However, since American's eat so much salt, the AHA says a positive change can occur by cutting back to even 2,400 milligrams of sodium a day.   · Follow the DASH (Dietary Approaches to Stop Hypertension) eating plan. This plan recommends vegetables, fruits, whole gains, and other heart healthy foods.  · Begin " an exercise program. Ask your doctor how to get started. The American Heart Association recommends aerobic exercise 3 to 4 times a week for an average of 40 minutes at a time, with your doctor's approval. Simple activities like walking or gardening can help.  · Break the smoking habit. Enroll in a stop-smoking program to improve your chances of success. Ask your healthcare provider about programs and medicines to help you stop smoking.  · Limit drinks that contain caffeine (coffee, black or green tea, cola) to 2 per day.  · Never take stimulants such as amphetamines or cocaine; these drugs can be deadly for someone with high blood pressure.  · Control your stress. Learn stress-management techniques.  · Limit alcohol to no more than 1 drink a day for women and 2 drinks a day for men.  Follow-up care  Make a follow-up appointment as directed by our staff.     When to seek medical care  Call your doctor immediately or seek emergency care if you have any of the following:  · Chest pain or shortness of breath (call 911)  · Moderate to severe headache  · Weakness in the muscles of your face, arms, or legs  · Trouble speaking  · Extreme drowsiness  · Confusion  · Fainting or dizziness  · Pulsating or rushing sound in your ears  · Unexplained nosebleed  · Weakness, tingling, or numbness of your face, arms, or legs  · Change in vision  · Blood pressure measured at home that is greater than 180/110   Date Last Reviewed: 4/27/2016  © 2136-5674 [employer]. [address] [place] [address], [place] [address]. All rights reserved. This information is not intended as a substitute for professional medical care. Always follow your healthcare professional's instructions.
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
## 2017-07-30 ENCOUNTER — OFFICE VISIT (OUTPATIENT)
Dept: URGENT CARE | Facility: CLINIC | Age: 77
End: 2017-07-30
Payer: MEDICARE

## 2017-07-30 ENCOUNTER — HOSPITAL ENCOUNTER (EMERGENCY)
Facility: HOSPITAL | Age: 77
Discharge: HOME | End: 2017-07-30
Attending: INTERNAL MEDICINE
Payer: MEDICARE

## 2017-07-30 VITALS
OXYGEN SATURATION: 96 % | HEART RATE: 70 BPM | TEMPERATURE: 98 F | DIASTOLIC BLOOD PRESSURE: 85 MMHG | RESPIRATION RATE: 20 BRPM | BODY MASS INDEX: 30.07 KG/M2 | HEIGHT: 73 IN | SYSTOLIC BLOOD PRESSURE: 169 MMHG | WEIGHT: 226.88 LBS

## 2017-07-30 VITALS
HEIGHT: 73 IN | SYSTOLIC BLOOD PRESSURE: 182 MMHG | BODY MASS INDEX: 29.54 KG/M2 | TEMPERATURE: 99 F | HEART RATE: 83 BPM | WEIGHT: 222.88 LBS | DIASTOLIC BLOOD PRESSURE: 98 MMHG | OXYGEN SATURATION: 97 %

## 2017-07-30 DIAGNOSIS — M77.8 TENDONITIS OF SHOULDER, RIGHT: ICD-10-CM

## 2017-07-30 DIAGNOSIS — N18.30 STAGE 3 CHRONIC KIDNEY DISEASE: Chronic | ICD-10-CM

## 2017-07-30 DIAGNOSIS — R03.0 ELEVATED BLOOD PRESSURE READING: Primary | ICD-10-CM

## 2017-07-30 DIAGNOSIS — E11.22 TYPE 2 DIABETES MELLITUS WITH STAGE 3 CHRONIC KIDNEY DISEASE, WITHOUT LONG-TERM CURRENT USE OF INSULIN: Chronic | ICD-10-CM

## 2017-07-30 DIAGNOSIS — M1A.09X1 IDIOPATHIC CHRONIC GOUT OF MULTIPLE SITES WITH TOPHUS: Chronic | ICD-10-CM

## 2017-07-30 DIAGNOSIS — M79.671 RIGHT FOOT PAIN: ICD-10-CM

## 2017-07-30 DIAGNOSIS — N18.30 TYPE 2 DIABETES MELLITUS WITH STAGE 3 CHRONIC KIDNEY DISEASE, WITHOUT LONG-TERM CURRENT USE OF INSULIN: Chronic | ICD-10-CM

## 2017-07-30 DIAGNOSIS — I10 ESSENTIAL HYPERTENSION: ICD-10-CM
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
## 2017-07-30 DIAGNOSIS — E55.9 VITAMIN D INSUFFICIENCY: Chronic | ICD-10-CM

## 2017-07-30 DIAGNOSIS — E11.59 HYPERTENSION ASSOCIATED WITH DIABETES: Chronic | ICD-10-CM

## 2017-07-30 DIAGNOSIS — I10 HYPERTENSION, UNCONTROLLED: Primary | ICD-10-CM

## 2017-07-30 DIAGNOSIS — M25.511 ACUTE PAIN OF RIGHT SHOULDER: ICD-10-CM

## 2017-07-30 DIAGNOSIS — M19.90 INFLAMMATORY ARTHRITIS: ICD-10-CM

## 2017-07-30 DIAGNOSIS — L03.032 CELLULITIS OF FOURTH TOE OF LEFT FOOT: ICD-10-CM

## 2017-07-30 DIAGNOSIS — I15.2 HYPERTENSION ASSOCIATED WITH DIABETES: Chronic | ICD-10-CM

## 2017-07-30 PROCEDURE — 99499 UNLISTED E&M SERVICE: CPT | Mod: S$GLB,,, | Performed by: NURSE PRACTITIONER

## 2017-07-30 PROCEDURE — 99212 OFFICE O/P EST SF 10 MIN: CPT | Mod: S$GLB,,, | Performed by: NURSE PRACTITIONER

## 2017-07-30 PROCEDURE — 1159F MED LIST DOCD IN RCRD: CPT | Mod: S$GLB,,, | Performed by: NURSE PRACTITIONER

## 2017-07-30 PROCEDURE — 99499 UNLISTED E&M SERVICE: CPT | Mod: ,,, | Performed by: INTERNAL MEDICINE

## 2017-07-30 PROCEDURE — 1157F ADVNC CARE PLAN IN RCRD: CPT | Mod: S$GLB,,, | Performed by: NURSE PRACTITIONER

## 2017-07-30 PROCEDURE — 99283 EMERGENCY DEPT VISIT LOW MDM: CPT

## 2017-07-30 PROCEDURE — 99999 PR PBB SHADOW E&M-EST. PATIENT-LVL III: CPT | Mod: PBBFAC,,, | Performed by: NURSE PRACTITIONER

## 2017-07-30 RX ORDER — TRAMADOL HYDROCHLORIDE 50 MG/1
50 TABLET ORAL 2 TIMES DAILY
Qty: 60 TABLET | Refills: 0 | Status: SHIPPED | OUTPATIENT
Start: 2017-07-30 | End: 2017-08-09

## 2017-07-30 RX ORDER — CEPHALEXIN 500 MG/1
500 CAPSULE ORAL EVERY 8 HOURS
Qty: 30 CAPSULE | Refills: 0 | Status: SHIPPED | OUTPATIENT
Start: 2017-07-30 | End: 2017-08-22

## 2017-07-30 RX ORDER — CLONIDINE HYDROCHLORIDE 0.1 MG/1
0.1 TABLET ORAL 2 TIMES DAILY PRN
Qty: 30 TABLET | Refills: 0 | Status: SHIPPED | OUTPATIENT
Start: 2017-07-30 | End: 2017-12-14

## 2017-07-30 RX ORDER — COLCHICINE 0.6 MG/1
0.6 TABLET ORAL DAILY
Qty: 30 TABLET | Refills: 11 | Status: SHIPPED | OUTPATIENT
Start: 2017-07-30 | End: 2018-10-07 | Stop reason: SDUPTHER

## 2017-07-30 RX ORDER — HYDRALAZINE HYDROCHLORIDE 25 MG/1
25 TABLET, FILM COATED ORAL 3 TIMES DAILY
Qty: 90 TABLET | Refills: 11 | Status: SHIPPED | OUTPATIENT
Start: 2017-07-30 | End: 2017-08-01 | Stop reason: SDUPTHER

## 2017-07-30 RX ORDER — VERAPAMIL HYDROCHLORIDE 240 MG/1
240 CAPSULE, EXTENDED RELEASE ORAL DAILY
Qty: 30 CAPSULE | Refills: 0 | Status: SHIPPED | OUTPATIENT
Start: 2017-07-30 | End: 2017-08-22 | Stop reason: DRUGHIGH
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
## 2017-07-30 NOTE — PROGRESS NOTES
Subjective:       Patient ID: Pablito Givens Jr. is a 77 y.o. male.    Chief Complaint: Hypertension    Patient presents with concern of elevated blood pressure and left shoulder pain.  Saw urgent care MD on yesterday with blood pressure of 210/105.  Was started on Verapamil.  Has documentation of blood pressure in the 190's/100's on this morning.  Currently 182/98.  Still complains of left shoulder pain. Started OTC topical lidocaine due to cost of Rx Lidocaine patch with no relief.  Denies any injury to shoulder.  Reported a recent fall and has been having right foot pain and swelling.  Has been taking Tylenol with mild relief.  Denies any chest pain, headaches, or shortness of breath.       Review of Systems   Constitutional: Negative for chills and fever.   Respiratory: Negative for cough, chest tightness and shortness of breath.    Musculoskeletal: Positive for arthralgias, gait problem, joint swelling and myalgias.   Neurological: Negative for dizziness and headaches.   Psychiatric/Behavioral: Positive for agitation. Negative for confusion.       Objective:      Physical Exam   Constitutional: He is oriented to person, place, and time. He appears well-developed and well-nourished.   HENT:   Head: Normocephalic.   Neck: Normal range of motion.   Cardiovascular: Normal rate.    Pulmonary/Chest: Effort normal.   Musculoskeletal: He exhibits edema and tenderness.        Right foot: There is tenderness. There is normal range of motion.        Feet:    Neurological: He is alert and oriented to person, place, and time.   Skin: Skin is warm and dry.       Assessment:       1. Elevated blood pressure reading    2. Acute pain of right shoulder    3. Right foot pain (post fall)        Plan:         Instructed to patient that blood pressure has improved but still elevated.  Educated that PO medications take some time to improve blood pressure.  Encouraged to follow up in ER for additional treatment for blood pressure and  shoulder pain.

## 2017-07-30 NOTE — DISCHARGE INSTRUCTIONS
1. Hypertension, uncontrolled: Pt has fallen with dizziness on Diovan which does not suit him and has not tolerated in the past. He is also allergic to Norvasc. Will increase Verapamil to 240 mg daily. Add hydralazine 25 TID and use Clonidine PRN for systolic greater than 180. No papilledema and no end organ damage. Further management with outpatient. Can stop diovan for now      2. Chronic kidney disease: Stage 3 stable. Ok to use Colchicine 3 times a week for gout and inflammatory arthritis. Will also give pain control to reduce BP see below       3. Inflammatory arthritis with gout: Add Colchicine and use 2 Tylenol along with Tramadol for pain control. Continue with Prednisone and Uloric. F/u with rheumatology.       4. Cellulitis of right foot: Keflex PO.      5. Fall: Pt evaluated, no bony injury. Fall precautions discussed in detail, prevention of future falls.

## 2017-08-01 ENCOUNTER — OFFICE VISIT (OUTPATIENT)
Dept: INTERNAL MEDICINE | Facility: CLINIC | Age: 77
End: 2017-08-01
Payer: MEDICARE

## 2017-08-01 VITALS
HEIGHT: 73 IN | BODY MASS INDEX: 30.47 KG/M2 | DIASTOLIC BLOOD PRESSURE: 68 MMHG | WEIGHT: 229.94 LBS | SYSTOLIC BLOOD PRESSURE: 144 MMHG | HEART RATE: 72 BPM | TEMPERATURE: 96 F | OXYGEN SATURATION: 95 %

## 2017-08-01 DIAGNOSIS — I15.2 HYPERTENSION ASSOCIATED WITH DIABETES: Primary | Chronic | ICD-10-CM

## 2017-08-01 DIAGNOSIS — E11.59 HYPERTENSION ASSOCIATED WITH DIABETES: Primary | Chronic | ICD-10-CM

## 2017-08-01 PROCEDURE — 99499 UNLISTED E&M SERVICE: CPT | Mod: S$GLB,,, | Performed by: FAMILY MEDICINE

## 2017-08-01 PROCEDURE — 1125F AMNT PAIN NOTED PAIN PRSNT: CPT | Mod: S$GLB,,, | Performed by: FAMILY MEDICINE

## 2017-08-01 PROCEDURE — 3066F NEPHROPATHY DOC TX: CPT | Mod: S$GLB,,, | Performed by: FAMILY MEDICINE

## 2017-08-01 PROCEDURE — 99999 PR PBB SHADOW E&M-EST. PATIENT-LVL III: CPT | Mod: PBBFAC,,, | Performed by: FAMILY MEDICINE

## 2017-08-01 PROCEDURE — 3045F PR MOST RECENT HEMOGLOBIN A1C LEVEL 7.0-9.0%: CPT | Mod: S$GLB,,, | Performed by: FAMILY MEDICINE

## 2017-08-01 PROCEDURE — 1157F ADVNC CARE PLAN IN RCRD: CPT | Mod: S$GLB,,, | Performed by: FAMILY MEDICINE

## 2017-08-01 PROCEDURE — 1159F MED LIST DOCD IN RCRD: CPT | Mod: S$GLB,,, | Performed by: FAMILY MEDICINE

## 2017-08-01 PROCEDURE — 99214 OFFICE O/P EST MOD 30 MIN: CPT | Mod: S$GLB,,, | Performed by: FAMILY MEDICINE

## 2017-08-01 RX ORDER — HYDRALAZINE HYDROCHLORIDE 50 MG/1
50 TABLET, FILM COATED ORAL 3 TIMES DAILY
Qty: 90 TABLET | Refills: 11 | Status: SHIPPED | OUTPATIENT
Start: 2017-08-01 | End: 2017-12-14

## 2017-08-01 NOTE — PROGRESS NOTES
Subjective:   Patient ID: Pablito Givens Jr. is a 77 y.o. male.  Chief Complaint:  Hypertension      Presents for follow-up on poorly controlled hypertension.  Last visit with me stop losartan 100 mg daily, changed to valsartan 320 mg daily  Evaluated in urgent care for dizziness related to valsartan.  Changed to verapamil  mg daily.  Blood pressure remain elevated.  Followed up urgent care/ER/kidney Dr. and now on verapamil  mg daily and hydralazine 25 mg 3 times a day with clonidine 0.1 mg twice a day if systolic blood pressure greater than 100  Overall feeling good.  Denies side effects from present blood pressure medicines.  Records from home still show significant elevations greater than 180 and frequent need for clonidine use.  Shoulder pain improving.  GERD stable.  Cellulitis of foot resolved.  Completing antibiotics.  No additional complaints concerns today.        Review of Systems   Constitutional: Negative for fatigue.   Eyes: Negative for visual disturbance.   Respiratory: Negative for chest tightness and shortness of breath.    Cardiovascular: Negative for chest pain, palpitations and leg swelling.   Gastrointestinal: Negative for abdominal pain.   Musculoskeletal: Positive for arthralgias (Shoulder right) and myalgias. Negative for neck pain.   Skin: Positive for rash (Right Foot Cellulitis).   Neurological: Negative for dizziness, syncope, weakness, light-headedness and headaches.       Current Outpatient Prescriptions:     acetaminophen (TYLENOL) 650 MG TbSR, Take 1 tablet (650 mg total) by mouth every 8 (eight) hours., Disp: , Rfl: 0    cephALEXin (KEFLEX) 500 MG capsule, Take 1 capsule (500 mg total) by mouth every 8 (eight) hours., Disp: 30 capsule, Rfl: 0    cloNIDine (CATAPRES) 0.1 MG tablet, Take 1 tablet (0.1 mg total) by mouth 2 (two) times daily as needed (for systolic BP greater than 180)., Disp: 30 tablet, Rfl: 0    colchicine 0.6 mg tablet, Take 1 tablet (0.6 mg total) by  "mouth once daily. As directed by MD instructions in ER, Disp: 30 tablet, Rfl: 11    febuxostat (ULORIC) 40 mg Tab, Take 1 tablet (40 mg total) by mouth once daily., Disp: 30 tablet, Rfl: 3    glimepiride (AMARYL) 1 MG tablet, Take 1 tablet (1 mg total) by mouth daily with breakfast., Disp: 90 tablet, Rfl: 0    hydrALAZINE (APRESOLINE) 50 MG tablet, Take 1 tablet (50 mg total) by mouth 3 (three) times daily., Disp: 90 tablet, Rfl: 11    predniSONE (DELTASONE) 10 MG tablet, Take 1 tablet (10 mg total) by mouth once daily., Disp: 30 tablet, Rfl: 3    tramadol (ULTRAM) 50 mg tablet, Take 1 tablet (50 mg total) by mouth 2 (two) times daily., Disp: 60 tablet, Rfl: 0    verapamil (VERELAN) 240 MG C24P, Take 1 capsule (240 mg total) by mouth once daily., Disp: 30 capsule, Rfl: 0    zolpidem (AMBIEN) 5 MG Tab, TAKE ONE TABLET BY MOUTH ONCE DAILY IN THE EVENING, Disp: 90 tablet, Rfl: 0    aspirin 81 MG Chew, Take 81 mg by mouth once daily., Disp: , Rfl:     Objective:   BP (!) 144/68 (BP Location: Right arm, Patient Position: Sitting, BP Method: Manual)   Pulse 72   Temp 96.4 °F (35.8 °C) (Tympanic)   Ht 6' 1" (1.854 m)   Wt 104.3 kg (229 lb 15 oz)   SpO2 95%   BMI 30.34 kg/m²     Physical Exam   Constitutional: Vital signs are normal. He appears well-developed and well-nourished.   BP improved., but not at goal   Neck: No JVD present.   Cardiovascular: Normal rate, regular rhythm and normal heart sounds.  Exam reveals no gallop and no friction rub.    No murmur heard.  Pulmonary/Chest: Effort normal and breath sounds normal. He has no wheezes. He has no rhonchi. He has no rales.   Abdominal: Soft. He exhibits no distension. There is no tenderness.   Musculoskeletal: He exhibits no edema.   Skin: Skin is dry. No rash noted.   Psychiatric: He has a normal mood and affect.   Nursing note and vitals reviewed.    Assessment:     1. Hypertension associated with diabetes      Plan:   Hypertension associated with " diabetes  -     hydrALAZINE (APRESOLINE) 50 MG tablet; Take 1 tablet (50 mg total) by mouth 3 (three) times daily.  Dispense: 90 tablet; Refill: 11    Continue Verapamil  mg daily  Increase Hydralazine 50 mg three times a day  Take Clonidine 0.1 mg twice a day if blood pressure  > 180/100  Return to clinic 1 month.  Please bring any blood pressure record to visit.  Patient and wife express understanding and agree to the above plan

## 2017-08-01 NOTE — PATIENT INSTRUCTIONS
Continue Verapamil  mg daily  Increase Hydralazine 50 mg three times a day  Take Clonidine 0.1 mg twice a day if blood pressure  > 180/100

## 2017-08-08 ENCOUNTER — LAB VISIT (OUTPATIENT)
Dept: LAB | Facility: HOSPITAL | Age: 77
End: 2017-08-08
Attending: INTERNAL MEDICINE
Payer: MEDICARE

## 2017-08-08 ENCOUNTER — OFFICE VISIT (OUTPATIENT)
Dept: RHEUMATOLOGY | Facility: CLINIC | Age: 77
End: 2017-08-08
Payer: MEDICARE

## 2017-08-08 VITALS
DIASTOLIC BLOOD PRESSURE: 68 MMHG | SYSTOLIC BLOOD PRESSURE: 135 MMHG | BODY MASS INDEX: 30.19 KG/M2 | HEART RATE: 84 BPM | WEIGHT: 228.81 LBS

## 2017-08-08 DIAGNOSIS — M06.09 RHEUMATOID ARTHRITIS OF MULTIPLE SITES WITH NEGATIVE RHEUMATOID FACTOR: ICD-10-CM

## 2017-08-08 DIAGNOSIS — M1A.09X1 IDIOPATHIC CHRONIC GOUT OF MULTIPLE SITES WITH TOPHUS: Chronic | ICD-10-CM

## 2017-08-08 DIAGNOSIS — M06.09 RHEUMATOID ARTHRITIS OF MULTIPLE SITES WITH NEGATIVE RHEUMATOID FACTOR: Primary | ICD-10-CM

## 2017-08-08 PROBLEM — M19.90 INFLAMMATORY ARTHRITIS: Status: RESOLVED | Noted: 2017-07-13 | Resolved: 2017-08-08

## 2017-08-08 LAB
ALBUMIN SERPL BCP-MCNC: 3.8 G/DL
ALP SERPL-CCNC: 65 U/L
ALT SERPL W/O P-5'-P-CCNC: 19 U/L
ANION GAP SERPL CALC-SCNC: 9 MMOL/L
AST SERPL-CCNC: 17 U/L
BASOPHILS # BLD AUTO: 0.03 K/UL
BASOPHILS NFR BLD: 0.3 %
BILIRUB SERPL-MCNC: 0.6 MG/DL
BUN SERPL-MCNC: 23 MG/DL
CALCIUM SERPL-MCNC: 10.2 MG/DL
CHLORIDE SERPL-SCNC: 107 MMOL/L
CO2 SERPL-SCNC: 26 MMOL/L
CREAT SERPL-MCNC: 1.5 MG/DL
CRP SERPL-MCNC: 5.7 MG/L
DIFFERENTIAL METHOD: ABNORMAL
EOSINOPHIL # BLD AUTO: 0.2 K/UL
EOSINOPHIL NFR BLD: 1.4 %
ERYTHROCYTE [DISTWIDTH] IN BLOOD BY AUTOMATED COUNT: 15.8 %
ERYTHROCYTE [SEDIMENTATION RATE] IN BLOOD BY WESTERGREN METHOD: 6 MM/HR
EST. GFR  (AFRICAN AMERICAN): 52 ML/MIN/1.73 M^2
EST. GFR  (NON AFRICAN AMERICAN): 45 ML/MIN/1.73 M^2
GLUCOSE SERPL-MCNC: 99 MG/DL
HCT VFR BLD AUTO: 44.2 %
HGB BLD-MCNC: 14 G/DL
LYMPHOCYTES # BLD AUTO: 1.2 K/UL
LYMPHOCYTES NFR BLD: 11.4 %
MCH RBC QN AUTO: 29.2 PG
MCHC RBC AUTO-ENTMCNC: 31.7 G/DL
MCV RBC AUTO: 92 FL
MONOCYTES # BLD AUTO: 0.8 K/UL
MONOCYTES NFR BLD: 7.1 %
NEUTROPHILS # BLD AUTO: 8.6 K/UL
NEUTROPHILS NFR BLD: 79.8 %
PLATELET # BLD AUTO: 330 K/UL
PMV BLD AUTO: 10 FL
POTASSIUM SERPL-SCNC: 4.1 MMOL/L
PROT SERPL-MCNC: 6.9 G/DL
RBC # BLD AUTO: 4.8 M/UL
SODIUM SERPL-SCNC: 142 MMOL/L
URATE SERPL-MCNC: 6.4 MG/DL
WBC # BLD AUTO: 10.82 K/UL

## 2017-08-08 PROCEDURE — 99999 PR PBB SHADOW E&M-EST. PATIENT-LVL III: CPT | Mod: PBBFAC,,, | Performed by: INTERNAL MEDICINE

## 2017-08-08 PROCEDURE — 1159F MED LIST DOCD IN RCRD: CPT | Mod: S$GLB,,, | Performed by: INTERNAL MEDICINE

## 2017-08-08 PROCEDURE — 96372 THER/PROPH/DIAG INJ SC/IM: CPT | Mod: S$GLB,,, | Performed by: INTERNAL MEDICINE

## 2017-08-08 PROCEDURE — 86140 C-REACTIVE PROTEIN: CPT

## 2017-08-08 PROCEDURE — 84550 ASSAY OF BLOOD/URIC ACID: CPT | Mod: PO

## 2017-08-08 PROCEDURE — 80053 COMPREHEN METABOLIC PANEL: CPT | Mod: PO

## 2017-08-08 PROCEDURE — 3008F BODY MASS INDEX DOCD: CPT | Mod: S$GLB,,, | Performed by: INTERNAL MEDICINE

## 2017-08-08 PROCEDURE — 85025 COMPLETE CBC W/AUTO DIFF WBC: CPT | Mod: PO

## 2017-08-08 PROCEDURE — 1157F ADVNC CARE PLAN IN RCRD: CPT | Mod: S$GLB,,, | Performed by: INTERNAL MEDICINE

## 2017-08-08 PROCEDURE — 1125F AMNT PAIN NOTED PAIN PRSNT: CPT | Mod: S$GLB,,, | Performed by: INTERNAL MEDICINE

## 2017-08-08 PROCEDURE — 85651 RBC SED RATE NONAUTOMATED: CPT | Mod: PO

## 2017-08-08 PROCEDURE — 99215 OFFICE O/P EST HI 40 MIN: CPT | Mod: 25,S$GLB,, | Performed by: INTERNAL MEDICINE

## 2017-08-08 PROCEDURE — 36415 COLL VENOUS BLD VENIPUNCTURE: CPT | Mod: PO

## 2017-08-08 PROCEDURE — 3075F SYST BP GE 130 - 139MM HG: CPT | Mod: S$GLB,,, | Performed by: INTERNAL MEDICINE

## 2017-08-08 PROCEDURE — 99499 UNLISTED E&M SERVICE: CPT | Mod: S$GLB,,, | Performed by: INTERNAL MEDICINE

## 2017-08-08 PROCEDURE — 3078F DIAST BP <80 MM HG: CPT | Mod: S$GLB,,, | Performed by: INTERNAL MEDICINE

## 2017-08-08 RX ORDER — ERGOCALCIFEROL 1.25 MG/1
50000 CAPSULE ORAL
COMMUNITY

## 2017-08-08 RX ORDER — FOLIC ACID 1 MG/1
1 TABLET ORAL DAILY
Qty: 30 TABLET | Refills: 3 | Status: SHIPPED | OUTPATIENT
Start: 2017-08-08 | End: 2017-12-17 | Stop reason: SDUPTHER

## 2017-08-08 RX ORDER — METHOTREXATE 2.5 MG/1
10 TABLET ORAL
Qty: 20 TABLET | Refills: 2 | Status: SHIPPED | OUTPATIENT
Start: 2017-08-08 | End: 2017-11-13 | Stop reason: SDUPTHER

## 2017-08-08 RX ORDER — METHOTREXATE 2.5 MG/1
2.5 TABLET ORAL
Qty: 20 TABLET | Refills: 2 | Status: SHIPPED | OUTPATIENT
Start: 2017-08-08 | End: 2017-08-08 | Stop reason: SDUPTHER

## 2017-08-08 RX ORDER — BETAMETHASONE SODIUM PHOSPHATE AND BETAMETHASONE ACETATE 3; 3 MG/ML; MG/ML
6 INJECTION, SUSPENSION INTRA-ARTICULAR; INTRALESIONAL; INTRAMUSCULAR; SOFT TISSUE
Status: COMPLETED | OUTPATIENT
Start: 2017-08-08 | End: 2017-08-08

## 2017-08-08 RX ADMIN — BETAMETHASONE SODIUM PHOSPHATE AND BETAMETHASONE ACETATE 6 MG: 3; 3 INJECTION, SUSPENSION INTRA-ARTICULAR; INTRALESIONAL; INTRAMUSCULAR; SOFT TISSUE at 01:08

## 2017-08-08 NOTE — ASSESSMENT & PLAN NOTE
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  Continue colchicine 3 days a week.  Continue prednisone at a higher dose.  If resistant to treatment with uloric consider pegloticase infusion.

## 2017-08-08 NOTE — ASSESSMENT & PLAN NOTE
Based on his history of chronic inflammatory arthritis, examination showing synovitis over bilateral wrists, bilateral elbows, a few MCPs and bilateral ankles and labs showing a negative rheumatoid serologies he most likely has rheumatoid factor negative rheumatoid arthritis.  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  Start methotrexate as a steroid sparing agent and increased dose of prednisone for not more than 10 mg because of his treatment resistant hypertension.  Take folic acid with methotrexate.  Advised to follow adverse effects including risk of infection and liver problems.  Check labs in 4 weeks.

## 2017-08-08 NOTE — PROGRESS NOTES
Administered 1 cc Betamethasone 6mg/cc  to right ventrogluteal. Pt tolerated well. No acute reaction noted to site. Pt instructed on S/S to report. Advised patient to wait in lobby 15 minutes after receiving injection to monitor for any reactions. Pt verbalized understanding.     Lot: [id number]  Exp: 02/19
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
## 2017-08-10 ENCOUNTER — TELEPHONE (OUTPATIENT)
Dept: RHEUMATOLOGY | Facility: CLINIC | Age: 77
End: 2017-08-10

## 2017-08-10 DIAGNOSIS — M19.90 INFLAMMATORY ARTHRITIS: ICD-10-CM

## 2017-08-10 DIAGNOSIS — M1A.09X1 IDIOPATHIC CHRONIC GOUT OF MULTIPLE SITES WITH TOPHUS: ICD-10-CM

## 2017-08-10 RX ORDER — PREDNISONE 10 MG/1
20 TABLET ORAL DAILY
Qty: 60 TABLET | Refills: 0 | Status: SHIPPED | OUTPATIENT
Start: 2017-08-10 | End: 2017-10-30 | Stop reason: SDUPTHER

## 2017-08-10 NOTE — TELEPHONE ENCOUNTER
----- Message from Juice Corrigan sent at 8/9/2017  4:05 PM CDT -----  Contact: pt  Pt is calling nurse staff regarding pt is doing ok. Pt call back to discuss further  [phone] to be advised thanks

## 2017-08-10 NOTE — TELEPHONE ENCOUNTER
Increase prednisone dose to 20 mg once daily for next 4 weeks and report back to me in 3 weeks to decide on dose change. Thanks.

## 2017-08-10 NOTE — TELEPHONE ENCOUNTER
Spoke with pt and he states that after the celestone injection he woke up the next morning and felt great but today his right arm is hurting again. States that the pain patches Dr. ALFONSO recommended have been helping as well.

## 2017-08-22 ENCOUNTER — CLINICAL SUPPORT (OUTPATIENT)
Dept: CARDIOLOGY | Facility: CLINIC | Age: 77
End: 2017-08-22
Payer: MEDICARE

## 2017-08-22 ENCOUNTER — OFFICE VISIT (OUTPATIENT)
Dept: INTERNAL MEDICINE | Facility: CLINIC | Age: 77
End: 2017-08-22
Payer: MEDICARE

## 2017-08-22 VITALS
DIASTOLIC BLOOD PRESSURE: 78 MMHG | HEIGHT: 73 IN | HEART RATE: 84 BPM | WEIGHT: 220.88 LBS | SYSTOLIC BLOOD PRESSURE: 160 MMHG | TEMPERATURE: 97 F | OXYGEN SATURATION: 97 % | BODY MASS INDEX: 29.27 KG/M2

## 2017-08-22 DIAGNOSIS — M1A.09X1 IDIOPATHIC CHRONIC GOUT OF MULTIPLE SITES WITH TOPHUS: Chronic | ICD-10-CM

## 2017-08-22 DIAGNOSIS — I15.2 HYPERTENSION ASSOCIATED WITH DIABETES: Primary | Chronic | ICD-10-CM

## 2017-08-22 DIAGNOSIS — Z90.5 HISTORY OF NEPHRECTOMY, UNILATERAL: ICD-10-CM

## 2017-08-22 DIAGNOSIS — R55 SYNCOPE AND COLLAPSE: ICD-10-CM

## 2017-08-22 DIAGNOSIS — E11.59 HYPERTENSION ASSOCIATED WITH DIABETES: Chronic | ICD-10-CM

## 2017-08-22 DIAGNOSIS — M06.09 RHEUMATOID ARTHRITIS OF MULTIPLE SITES WITH NEGATIVE RHEUMATOID FACTOR: ICD-10-CM

## 2017-08-22 DIAGNOSIS — R42 DIZZINESS: ICD-10-CM

## 2017-08-22 DIAGNOSIS — E55.9 VITAMIN D INSUFFICIENCY: Chronic | ICD-10-CM

## 2017-08-22 DIAGNOSIS — E11.22 TYPE 2 DIABETES MELLITUS WITH STAGE 3 CHRONIC KIDNEY DISEASE, WITHOUT LONG-TERM CURRENT USE OF INSULIN: Chronic | ICD-10-CM

## 2017-08-22 DIAGNOSIS — E11.59 HYPERTENSION ASSOCIATED WITH DIABETES: Primary | Chronic | ICD-10-CM

## 2017-08-22 DIAGNOSIS — Z85.528 HISTORY OF KIDNEY CANCER: ICD-10-CM

## 2017-08-22 DIAGNOSIS — N18.30 TYPE 2 DIABETES MELLITUS WITH STAGE 3 CHRONIC KIDNEY DISEASE, WITHOUT LONG-TERM CURRENT USE OF INSULIN: Chronic | ICD-10-CM

## 2017-08-22 DIAGNOSIS — K59.00 CONSTIPATION, UNSPECIFIED CONSTIPATION TYPE: ICD-10-CM

## 2017-08-22 DIAGNOSIS — I15.2 HYPERTENSION ASSOCIATED WITH DIABETES: Chronic | ICD-10-CM

## 2017-08-22 DIAGNOSIS — N18.30 STAGE 3 CHRONIC KIDNEY DISEASE: Chronic | ICD-10-CM

## 2017-08-22 PROCEDURE — 3077F SYST BP >= 140 MM HG: CPT | Mod: S$GLB,,, | Performed by: FAMILY MEDICINE

## 2017-08-22 PROCEDURE — 1159F MED LIST DOCD IN RCRD: CPT | Mod: S$GLB,,, | Performed by: FAMILY MEDICINE

## 2017-08-22 PROCEDURE — 99999 PR PBB SHADOW E&M-EST. PATIENT-LVL III: CPT | Mod: PBBFAC,,, | Performed by: FAMILY MEDICINE

## 2017-08-22 PROCEDURE — 3045F PR MOST RECENT HEMOGLOBIN A1C LEVEL 7.0-9.0%: CPT | Mod: S$GLB,,, | Performed by: FAMILY MEDICINE

## 2017-08-22 PROCEDURE — 3078F DIAST BP <80 MM HG: CPT | Mod: S$GLB,,, | Performed by: FAMILY MEDICINE

## 2017-08-22 PROCEDURE — 99214 OFFICE O/P EST MOD 30 MIN: CPT | Mod: S$GLB,,, | Performed by: FAMILY MEDICINE

## 2017-08-22 PROCEDURE — 1126F AMNT PAIN NOTED NONE PRSNT: CPT | Mod: S$GLB,,, | Performed by: FAMILY MEDICINE

## 2017-08-22 PROCEDURE — 99499 UNLISTED E&M SERVICE: CPT | Mod: S$GLB,,, | Performed by: FAMILY MEDICINE

## 2017-08-22 PROCEDURE — 93000 ELECTROCARDIOGRAM COMPLETE: CPT | Mod: S$GLB,,, | Performed by: INTERNAL MEDICINE

## 2017-08-22 PROCEDURE — 3066F NEPHROPATHY DOC TX: CPT | Mod: S$GLB,,, | Performed by: FAMILY MEDICINE

## 2017-08-22 PROCEDURE — 3008F BODY MASS INDEX DOCD: CPT | Mod: S$GLB,,, | Performed by: FAMILY MEDICINE

## 2017-08-22 PROCEDURE — 1157F ADVNC CARE PLAN IN RCRD: CPT | Mod: S$GLB,,, | Performed by: FAMILY MEDICINE

## 2017-08-22 RX ORDER — VERAPAMIL HYDROCHLORIDE 300 MG/1
300 CAPSULE, EXTENDED RELEASE ORAL NIGHTLY
Qty: 30 CAPSULE | Refills: 1 | Status: SHIPPED | OUTPATIENT
Start: 2017-08-22 | End: 2017-09-05 | Stop reason: SDUPTHER

## 2017-08-22 RX ORDER — POLYETHYLENE GLYCOL 3350 17 G/17G
17 POWDER, FOR SOLUTION ORAL DAILY
Qty: 1 BOTTLE | Refills: 0 | Status: SHIPPED | OUTPATIENT
Start: 2017-08-22 | End: 2017-12-14

## 2017-08-22 NOTE — PROGRESS NOTES
Subjective:       Patient ID: Pablito Givens Jr. is a 77 y.o. male.    Chief Complaint: Establish Care    77-year-old male patient with Patient Active Problem List:     Hypertension associated with diabetes     Gout, chronic     Testosterone deficiency     Osteoarthritis of both knees     Vitamin D insufficiency     Type 2 diabetes mellitus with stage 3 chronic kidney disease     History of kidney cancer     History of total left knee replacement     Neuropathy     Primary osteoarthritis of right elbow     Stage 3 chronic kidney disease     History of nephrectomy, unilateral     Sacroiliitis     Rheumatoid arthritis of multiple sites with negative rheumatoid factor  Here for follow-up on blood pressure.  Patient reported his blood pressure log which has been running in the range of 150s to 170s/80s.  Patient reports that he feels dizziness early in the morning denies of any syncopal episodes.   Patient was initially discontinued on valsartan because of dizziness.  Currently being treated with prednisone for gout flareups, has been doing well without any symptoms.   Patient denies of any chest pain or shortness of breath, palpitations.   Patient reports that previously his blood pressures was well controlled.  Has not been taking clonidine as it was advised to take it only when it gets be on 180/100    .       Review of Systems   Constitutional: Negative for appetite change and fatigue.   Eyes: Negative for visual disturbance.   Respiratory: Negative for shortness of breath.    Cardiovascular: Negative for chest pain, palpitations and leg swelling.   Gastrointestinal: Negative for abdominal pain, nausea and vomiting.   Musculoskeletal: Negative for myalgias.   Skin: Negative for rash.   Neurological: Positive for dizziness. Negative for syncope and headaches.   Psychiatric/Behavioral: Negative for sleep disturbance.         BP (!) 160/78 (BP Location: Left arm, Patient Position: Sitting)   Pulse 84   Temp 96.8 °F  "(36 °C) (Tympanic)   Ht 6' 1" (1.854 m)   Wt 100.2 kg (220 lb 14.4 oz)   SpO2 97%   BMI 29.14 kg/m²   Objective:      Physical Exam   Constitutional: He is oriented to person, place, and time. He appears well-developed and well-nourished.   HENT:   Head: Normocephalic and atraumatic.   Mouth/Throat: Oropharynx is clear and moist.   Cardiovascular: Normal rate, regular rhythm and normal heart sounds.    No murmur heard.  Negative for carotid bruit bilaterally    Pulmonary/Chest: Effort normal and breath sounds normal. He has no wheezes.   Abdominal: Soft. Bowel sounds are normal. There is no tenderness.   Musculoskeletal: He exhibits no edema.   Neurological: He is alert and oriented to person, place, and time.   Skin: Skin is warm and dry. No rash noted.   Psychiatric: He has a normal mood and affect.       No visits with results within 2 Week(s) from this visit.   Latest known visit with results is:   Lab Visit on 08/08/2017   Component Date Value Ref Range Status    WBC 08/08/2017 10.82  3.90 - 12.70 K/uL Final    RBC 08/08/2017 4.80  4.60 - 6.20 M/uL Final    Hemoglobin 08/08/2017 14.0  14.0 - 18.0 g/dL Final    Hematocrit 08/08/2017 44.2  40.0 - 54.0 % Final    MCV 08/08/2017 92  82 - 98 fL Final    MCH 08/08/2017 29.2  27.0 - 31.0 pg Final    MCHC 08/08/2017 31.7* 32.0 - 36.0 g/dL Final    RDW 08/08/2017 15.8* 11.5 - 14.5 % Final    Platelets 08/08/2017 330  150 - 350 K/uL Final    MPV 08/08/2017 10.0  9.2 - 12.9 fL Final    Gran # 08/08/2017 8.6* 1.8 - 7.7 K/uL Final    Lymph # 08/08/2017 1.2  1.0 - 4.8 K/uL Final    Mono # 08/08/2017 0.8  0.3 - 1.0 K/uL Final    Eos # 08/08/2017 0.2  0.0 - 0.5 K/uL Final    Baso # 08/08/2017 0.03  0.00 - 0.20 K/uL Final    Gran% 08/08/2017 79.8* 38.0 - 73.0 % Final    Lymph% 08/08/2017 11.4* 18.0 - 48.0 % Final    Mono% 08/08/2017 7.1  4.0 - 15.0 % Final    Eosinophil% 08/08/2017 1.4  0.0 - 8.0 % Final    Basophil% 08/08/2017 0.3  0.0 - 1.9 % Final    " Differential Method 08/08/2017 Automated   Final    Sodium 08/08/2017 142  136 - 145 mmol/L Final    Potassium 08/08/2017 4.1  3.5 - 5.1 mmol/L Final    Chloride 08/08/2017 107  95 - 110 mmol/L Final    CO2 08/08/2017 26  23 - 29 mmol/L Final    Glucose 08/08/2017 99  70 - 110 mg/dL Final    BUN, Bld 08/08/2017 23  8 - 23 mg/dL Final    Creatinine 08/08/2017 1.5* 0.5 - 1.4 mg/dL Final    Calcium 08/08/2017 10.2  8.7 - 10.5 mg/dL Final    Total Protein 08/08/2017 6.9  6.0 - 8.4 g/dL Final    Albumin 08/08/2017 3.8  3.5 - 5.2 g/dL Final    Total Bilirubin 08/08/2017 0.6  0.1 - 1.0 mg/dL Final    Comment: For infants and newborns, interpretation of results should be based  on gestational age, weight and in agreement with clinical  observations.  Premature Infant recommended reference ranges:  Up to 24 hours.............<8.0 mg/dL  Up to 48 hours............<12.0 mg/dL  3-5 days..................<15.0 mg/dL  6-29 days.................<15.0 mg/dL      Alkaline Phosphatase 08/08/2017 65  55 - 135 U/L Final    AST 08/08/2017 17  10 - 40 U/L Final    ALT 08/08/2017 19  10 - 44 U/L Final    Anion Gap 08/08/2017 9  8 - 16 mmol/L Final    eGFR if  08/08/2017 52* >60 mL/min/1.73 m^2 Final    eGFR if non  08/08/2017 45* >60 mL/min/1.73 m^2 Final    Comment: Calculation used to obtain the estimated glomerular filtration  rate (eGFR) is the CKD-EPI equation. Since race is unknown   in our information system, the eGFR values for   -American and Non--American patients are given   for each creatinine result.      CRP 08/08/2017 5.7  0.0 - 8.2 mg/L Final    Sed Rate 08/08/2017 6  0 - 10 mm/Hr Final    Uric Acid 08/08/2017 6.4  3.4 - 7.0 mg/dL Final       Assessment:       1. Hypertension associated with diabetes    2. Type 2 diabetes mellitus with stage 3 chronic kidney disease, without long-term current use of insulin    3. Stage 3 chronic kidney disease    4.  Idiopathic chronic gout of multiple sites with tophus    5. History of kidney cancer    6. History of nephrectomy, unilateral    7. Rheumatoid arthritis of multiple sites with negative rheumatoid factor    8. Vitamin D insufficiency    9. Dizziness    10. Syncope and collapse     11. Constipation, unspecified constipation type        Plan:   Hypertension associated with diabetes  -     EKG 12-lead; Future  We'll get EKG.  Blood pressure mildly elevated today, will increase verapamil from 240-300 mg daily.   Continue taking hydralazine 50 mg 3 times daily  Follow-up in one month      Type 2 diabetes mellitus with stage 3 chronic kidney disease, without long-term current use of insulin-last A1c 7.3.  Currently taking Amaryl 1 mg  Stage 3 chronic kidney disease  Encouraged to drink adequate fluids.  We'll continue to monitor kidney functions.  Likely elevation of blood glucose levels could be secondary to steroid      Idiopathic chronic gout of multiple sites with tophus-currently on uloric and steroids     History of kidney cancer  History of nephrectomy, unilateral    Rheumatoid arthritis of multiple sites with negative rheumatoid factor-followed by rheumatology     Vitamin D insufficiency-taking vitamin D     Dizziness  -     US Carotid Bilateral; Future; Expected date: 08/22/2017  -     EKG 12-lead; Future   will get further evaluation to rule out causes for dizziness  Encouraged to eat small frequent meals    Syncope and collapse   -     US Carotid Bilateral; Future; Expected date: 08/22/2017    Constipation, unspecified constipation type  -     polyethylene glycol (GLYCOLAX) 17 gram/dose powder; Take 17 g by mouth once daily.  Dispense: 1 Bottle; Refill: 0   MiraLAX prescribed today  Encouraged to drink adequate fluids and eat fiber rich diet    Other orders  -     verapamil (VERELAN PM) 300 mg 24 hr capsule; Take 300 mg by mouth every evening.  Dispense: 30 capsule; Refill: 1

## 2017-08-23 ENCOUNTER — TELEPHONE (OUTPATIENT)
Dept: RADIOLOGY | Facility: HOSPITAL | Age: 77
End: 2017-08-23

## 2017-08-24 ENCOUNTER — HOSPITAL ENCOUNTER (OUTPATIENT)
Dept: RADIOLOGY | Facility: HOSPITAL | Age: 77
Discharge: HOME | End: 2017-08-24
Attending: FAMILY MEDICINE
Payer: MEDICARE

## 2017-08-24 ENCOUNTER — TELEPHONE (OUTPATIENT)
Dept: INTERNAL MEDICINE | Facility: CLINIC | Age: 77
End: 2017-08-24

## 2017-08-24 DIAGNOSIS — R42 DIZZINESS: ICD-10-CM

## 2017-08-24 DIAGNOSIS — R55 SYNCOPE AND COLLAPSE: ICD-10-CM

## 2017-08-24 DIAGNOSIS — I65.21 STENOSIS OF RIGHT CAROTID ARTERY: Primary | ICD-10-CM

## 2017-08-24 PROCEDURE — 93880 EXTRACRANIAL BILAT STUDY: CPT | Mod: TC,PO

## 2017-08-24 PROCEDURE — 93880 EXTRACRANIAL BILAT STUDY: CPT | Mod: 26,,, | Performed by: RADIOLOGY

## 2017-08-24 NOTE — TELEPHONE ENCOUNTER
Will refer to vascular surgery secondary to 50-69% stenosis on the right side, likely causing dizziness.  Patient to be advised to take aspirin 81 mg daily

## 2017-08-30 NOTE — TELEPHONE ENCOUNTER
Spoke to pt. informed pt of test results and recommendations. Pt states he will take a 81mg aspirin daily but he will think about see vascular surgery and call back to schedule.

## 2017-09-05 ENCOUNTER — OFFICE VISIT (OUTPATIENT)
Dept: RHEUMATOLOGY | Facility: CLINIC | Age: 77
End: 2017-09-05
Payer: MEDICARE

## 2017-09-05 VITALS
HEART RATE: 84 BPM | DIASTOLIC BLOOD PRESSURE: 78 MMHG | SYSTOLIC BLOOD PRESSURE: 142 MMHG | WEIGHT: 226.19 LBS | BODY MASS INDEX: 29.84 KG/M2

## 2017-09-05 DIAGNOSIS — M06.09 RHEUMATOID ARTHRITIS OF MULTIPLE SITES WITH NEGATIVE RHEUMATOID FACTOR: Primary | ICD-10-CM

## 2017-09-05 DIAGNOSIS — M1A.09X1 IDIOPATHIC CHRONIC GOUT OF MULTIPLE SITES WITH TOPHUS: ICD-10-CM

## 2017-09-05 DIAGNOSIS — Z79.899 LONG-TERM CURRENT USE OF HIGH RISK MEDICATION OTHER THAN ANTICOAGULANT: ICD-10-CM

## 2017-09-05 PROCEDURE — 1157F ADVNC CARE PLAN IN RCRD: CPT | Mod: S$GLB,,, | Performed by: INTERNAL MEDICINE

## 2017-09-05 PROCEDURE — 3008F BODY MASS INDEX DOCD: CPT | Mod: S$GLB,,, | Performed by: INTERNAL MEDICINE

## 2017-09-05 PROCEDURE — 3078F DIAST BP <80 MM HG: CPT | Mod: S$GLB,,, | Performed by: INTERNAL MEDICINE

## 2017-09-05 PROCEDURE — 99214 OFFICE O/P EST MOD 30 MIN: CPT | Mod: S$GLB,,, | Performed by: INTERNAL MEDICINE

## 2017-09-05 PROCEDURE — 1159F MED LIST DOCD IN RCRD: CPT | Mod: S$GLB,,, | Performed by: INTERNAL MEDICINE

## 2017-09-05 PROCEDURE — 3077F SYST BP >= 140 MM HG: CPT | Mod: S$GLB,,, | Performed by: INTERNAL MEDICINE

## 2017-09-05 PROCEDURE — 99499 UNLISTED E&M SERVICE: CPT | Mod: S$GLB,,, | Performed by: INTERNAL MEDICINE

## 2017-09-05 PROCEDURE — 99999 PR PBB SHADOW E&M-EST. PATIENT-LVL III: CPT | Mod: PBBFAC,,, | Performed by: INTERNAL MEDICINE

## 2017-09-05 PROCEDURE — 1126F AMNT PAIN NOTED NONE PRSNT: CPT | Mod: S$GLB,,, | Performed by: INTERNAL MEDICINE

## 2017-09-05 RX ORDER — VERAPAMIL HYDROCHLORIDE 300 MG/1
300 CAPSULE, EXTENDED RELEASE ORAL NIGHTLY
Qty: 30 CAPSULE | Refills: 6 | Status: SHIPPED | OUTPATIENT
Start: 2017-09-05 | End: 2017-12-14
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
## 2017-09-10 DIAGNOSIS — F51.04 CHRONIC INSOMNIA: ICD-10-CM

## 2017-09-11 RX ORDER — ZOLPIDEM TARTRATE 5 MG/1
TABLET ORAL
Qty: 90 TABLET | Refills: 0 | Status: SHIPPED | OUTPATIENT
Start: 2017-09-11 | End: 2017-12-09 | Stop reason: SDUPTHER

## 2017-09-19 ENCOUNTER — TELEPHONE (OUTPATIENT)
Dept: INTERNAL MEDICINE | Facility: CLINIC | Age: 77
End: 2017-09-19

## 2017-09-19 DIAGNOSIS — K59.00 CONSTIPATION, UNSPECIFIED CONSTIPATION TYPE: Primary | ICD-10-CM

## 2017-09-19 RX ORDER — BISACODYL 5 MG
5 TABLET, DELAYED RELEASE (ENTERIC COATED) ORAL DAILY PRN
Qty: 30 TABLET | Refills: 0 | Status: SHIPPED | OUTPATIENT
Start: 2017-09-19 | End: 2017-12-14

## 2017-09-19 NOTE — TELEPHONE ENCOUNTER
----- Message from Sadie East sent at 9/19/2017 12:54 PM CDT -----  Contact: PT  Pt request Nurse to call back. He is in pain ..[phone] (home) [phone] (work)

## 2017-09-19 NOTE — TELEPHONE ENCOUNTER
Spoke  To pt. He states that glycolax isnt working and he hasnt had a bowel moment in 3 days and he is constipated. Pt is requesting something stronger. Please advise

## 2017-09-19 NOTE — TELEPHONE ENCOUNTER
Spoke to pt. Pt is complaining of constipation even after taking mirlax. Pt is complaining of abdomen pain . I recommended pt to go an urgent care for abdomen pain. Pt verbalized understanding

## 2017-09-19 NOTE — TELEPHONE ENCOUNTER
----- Message from Aysha Muse sent at 9/19/2017  1:40 PM CDT -----  Contact: Pt  Pt requesting to speak to the nurse. Pt has not had a bowel movement in three days. Pt has been taking otc products and Miralax, but it's not working. Pt is requesting to have an rx. Pls call pt back at [phone] (spouse, Mayda).

## 2017-09-19 NOTE — TELEPHONE ENCOUNTER
Try bisacodyl out in addition to GlycoLax for constipation and drink adequate fluids and eat fiber rich diet, if no response patient should go to ER for possible disimpaction

## 2017-10-08 DIAGNOSIS — E11.22 TYPE 2 DIABETES MELLITUS WITH STAGE 3 CHRONIC KIDNEY DISEASE, WITHOUT LONG-TERM CURRENT USE OF INSULIN: Chronic | ICD-10-CM

## 2017-10-08 DIAGNOSIS — N18.30 TYPE 2 DIABETES MELLITUS WITH STAGE 3 CHRONIC KIDNEY DISEASE, WITHOUT LONG-TERM CURRENT USE OF INSULIN: Chronic | ICD-10-CM

## 2017-10-09 RX ORDER — GLIMEPIRIDE 1 MG/1
TABLET ORAL
Qty: 90 TABLET | Refills: 0 | Status: SHIPPED | OUTPATIENT
Start: 2017-10-09 | End: 2018-01-08 | Stop reason: SDUPTHER

## 2017-10-18 ENCOUNTER — TELEPHONE (OUTPATIENT)
Dept: RHEUMATOLOGY | Facility: CLINIC | Age: 77
End: 2017-10-18

## 2017-10-18 DIAGNOSIS — M1A.09X1 IDIOPATHIC CHRONIC GOUT OF MULTIPLE SITES WITH TOPHUS: ICD-10-CM

## 2017-10-18 DIAGNOSIS — M06.09 RHEUMATOID ARTHRITIS OF MULTIPLE SITES WITH NEGATIVE RHEUMATOID FACTOR: Primary | ICD-10-CM

## 2017-10-18 RX ORDER — TRAMADOL HYDROCHLORIDE 50 MG/1
50 TABLET ORAL
Qty: 30 TABLET | Refills: 0 | Status: SHIPPED | OUTPATIENT
Start: 2017-10-18 | End: 2018-01-22 | Stop reason: SDUPTHER

## 2017-10-18 NOTE — TELEPHONE ENCOUNTER
Patient is requesting a refill of Tramadol. This is not on his Med list.  Last seen 9-5  rtc 12-14.

## 2017-10-18 NOTE — TELEPHONE ENCOUNTER
Reviewed patients records and ran [employer] system. He revcieved tramadol from [facility] in 07/2017 for gout flare up. He also has RA. Will give once daily tramadol to take as needed . Please inform patient not to take tramadol every day when not in pain since it is habit forming and leadds to addiction problems in future. Thanks.

## 2017-10-30 DIAGNOSIS — E55.9 VITAMIN D INSUFFICIENCY: ICD-10-CM

## 2017-10-30 DIAGNOSIS — M1A.09X1 IDIOPATHIC CHRONIC GOUT OF MULTIPLE SITES WITH TOPHUS: ICD-10-CM

## 2017-10-30 DIAGNOSIS — M19.90 INFLAMMATORY ARTHRITIS: ICD-10-CM

## 2017-10-30 RX ORDER — ERGOCALCIFEROL 1.25 MG/1
CAPSULE ORAL
Qty: 12 CAPSULE | Refills: 0 | OUTPATIENT
Start: 2017-10-30

## 2017-10-30 RX ORDER — PREDNISONE 5 MG/1
5 TABLET ORAL DAILY
Qty: 60 TABLET | Refills: 0 | Status: SHIPPED | OUTPATIENT
Start: 2017-10-30 | End: 2017-12-01 | Stop reason: SDUPTHER

## 2017-10-30 NOTE — TELEPHONE ENCOUNTER
Patient reportedly change PCP to Dr. Eller.  However last vitamin D level may.  Started with high-dose supplement.  Needs level rechecked for any additional refills.

## 2017-10-31 DIAGNOSIS — M1A.09X1 IDIOPATHIC CHRONIC GOUT OF MULTIPLE SITES WITH TOPHUS: ICD-10-CM

## 2017-10-31 DIAGNOSIS — M19.90 INFLAMMATORY ARTHRITIS: ICD-10-CM

## 2017-10-31 RX ORDER — PREDNISONE 10 MG/1
TABLET ORAL
Qty: 60 TABLET | Refills: 0 | OUTPATIENT
Start: 2017-10-31

## 2017-11-01 DIAGNOSIS — E55.9 VITAMIN D INSUFFICIENCY: ICD-10-CM

## 2017-11-01 NOTE — TELEPHONE ENCOUNTER
Spoke with pt and pt is only taking half of a 10 mg tablet of prednisone for a total of 5 mg prednisone. Advised patient that RX for prednisone 5 mg daily was sent in. Verbalized understanding.      Spoke with [facility] pharmacy and 5 mg prednsione tablets are ready for pt to .

## 2017-11-02 RX ORDER — ERGOCALCIFEROL 1.25 MG/1
CAPSULE ORAL
Qty: 12 CAPSULE | Refills: 0 | OUTPATIENT
Start: 2017-11-02

## 2017-11-13 DIAGNOSIS — M06.09 RHEUMATOID ARTHRITIS OF MULTIPLE SITES WITH NEGATIVE RHEUMATOID FACTOR: ICD-10-CM

## 2017-11-14 RX ORDER — METHOTREXATE 2.5 MG/1
TABLET ORAL
Qty: 20 TABLET | Refills: 1 | Status: SHIPPED | OUTPATIENT
Start: 2017-11-14 | End: 2018-01-29 | Stop reason: SDUPTHER

## 2017-12-01 DIAGNOSIS — M19.90 INFLAMMATORY ARTHRITIS: ICD-10-CM

## 2017-12-01 DIAGNOSIS — M1A.09X1 IDIOPATHIC CHRONIC GOUT OF MULTIPLE SITES WITH TOPHUS: ICD-10-CM

## 2017-12-04 RX ORDER — PREDNISONE 5 MG/1
TABLET ORAL
Qty: 60 TABLET | Refills: 0 | Status: SHIPPED | OUTPATIENT
Start: 2017-12-04 | End: 2017-12-14

## 2017-12-07 ENCOUNTER — LAB VISIT (OUTPATIENT)
Dept: LAB | Facility: HOSPITAL | Age: 77
End: 2017-12-07
Attending: INTERNAL MEDICINE
Payer: MEDICARE

## 2017-12-07 DIAGNOSIS — M06.09 RHEUMATOID ARTHRITIS OF MULTIPLE SITES WITH NEGATIVE RHEUMATOID FACTOR: ICD-10-CM

## 2017-12-07 LAB
ALBUMIN SERPL BCP-MCNC: 4 G/DL
ALP SERPL-CCNC: 66 U/L
ALT SERPL W/O P-5'-P-CCNC: 19 U/L
ANION GAP SERPL CALC-SCNC: 8 MMOL/L
AST SERPL-CCNC: 18 U/L
BASOPHILS # BLD AUTO: 0.05 K/UL
BASOPHILS NFR BLD: 0.5 %
BILIRUB SERPL-MCNC: 0.7 MG/DL
BUN SERPL-MCNC: 33 MG/DL
CALCIUM SERPL-MCNC: 10.8 MG/DL
CHLORIDE SERPL-SCNC: 108 MMOL/L
CO2 SERPL-SCNC: 28 MMOL/L
CREAT SERPL-MCNC: 1.6 MG/DL
CRP SERPL-MCNC: 3.3 MG/L
DIFFERENTIAL METHOD: ABNORMAL
EOSINOPHIL # BLD AUTO: 0.3 K/UL
EOSINOPHIL NFR BLD: 2.9 %
ERYTHROCYTE [DISTWIDTH] IN BLOOD BY AUTOMATED COUNT: 15.9 %
ERYTHROCYTE [SEDIMENTATION RATE] IN BLOOD BY WESTERGREN METHOD: 15 MM/HR
EST. GFR  (AFRICAN AMERICAN): 48 ML/MIN/1.73 M^2
EST. GFR  (NON AFRICAN AMERICAN): 41.5 ML/MIN/1.73 M^2
GLUCOSE SERPL-MCNC: 85 MG/DL
HCT VFR BLD AUTO: 43.4 %
HGB BLD-MCNC: 13.9 G/DL
LYMPHOCYTES # BLD AUTO: 1.3 K/UL
LYMPHOCYTES NFR BLD: 13.7 %
MCH RBC QN AUTO: 29.4 PG
MCHC RBC AUTO-ENTMCNC: 32 G/DL
MCV RBC AUTO: 92 FL
MONOCYTES # BLD AUTO: 0.7 K/UL
MONOCYTES NFR BLD: 7.7 %
NEUTROPHILS # BLD AUTO: 7.3 K/UL
NEUTROPHILS NFR BLD: 75.2 %
PLATELET # BLD AUTO: 384 K/UL
PMV BLD AUTO: 10.5 FL
POTASSIUM SERPL-SCNC: 4.5 MMOL/L
PROT SERPL-MCNC: 7.7 G/DL
RBC # BLD AUTO: 4.73 M/UL
SODIUM SERPL-SCNC: 144 MMOL/L
WBC # BLD AUTO: 9.67 K/UL

## 2017-12-07 PROCEDURE — 36415 COLL VENOUS BLD VENIPUNCTURE: CPT | Mod: PO

## 2017-12-07 PROCEDURE — 85025 COMPLETE CBC W/AUTO DIFF WBC: CPT | Mod: PO

## 2017-12-07 PROCEDURE — 85651 RBC SED RATE NONAUTOMATED: CPT | Mod: PO

## 2017-12-07 PROCEDURE — 86140 C-REACTIVE PROTEIN: CPT

## 2017-12-07 PROCEDURE — 80053 COMPREHEN METABOLIC PANEL: CPT

## 2017-12-09 DIAGNOSIS — F51.04 CHRONIC INSOMNIA: ICD-10-CM

## 2017-12-11 RX ORDER — ZOLPIDEM TARTRATE 5 MG/1
5 TABLET ORAL NIGHTLY PRN
Qty: 90 TABLET | Refills: 0 | Status: SHIPPED | OUTPATIENT
Start: 2017-12-11 | End: 2018-02-27 | Stop reason: SDUPTHER

## 2017-12-14 ENCOUNTER — OFFICE VISIT (OUTPATIENT)
Dept: RHEUMATOLOGY | Facility: CLINIC | Age: 77
End: 2017-12-14
Payer: MEDICARE

## 2017-12-14 VITALS
HEART RATE: 92 BPM | WEIGHT: 226.19 LBS | BODY MASS INDEX: 29.84 KG/M2 | DIASTOLIC BLOOD PRESSURE: 79 MMHG | SYSTOLIC BLOOD PRESSURE: 148 MMHG

## 2017-12-14 DIAGNOSIS — M77.11 LATERAL EPICONDYLITIS OF RIGHT ELBOW: Primary | ICD-10-CM

## 2017-12-14 DIAGNOSIS — M1A.09X1 IDIOPATHIC CHRONIC GOUT OF MULTIPLE SITES WITH TOPHUS: ICD-10-CM

## 2017-12-14 DIAGNOSIS — M06.09 RHEUMATOID ARTHRITIS OF MULTIPLE SITES WITH NEGATIVE RHEUMATOID FACTOR: ICD-10-CM

## 2017-12-14 DIAGNOSIS — Z79.899 LONG-TERM CURRENT USE OF HIGH RISK MEDICATION OTHER THAN ANTICOAGULANT: ICD-10-CM

## 2017-12-14 PROCEDURE — 99499 UNLISTED E&M SERVICE: CPT | Mod: S$GLB,,, | Performed by: INTERNAL MEDICINE

## 2017-12-14 PROCEDURE — 99999 PR PBB SHADOW E&M-EST. PATIENT-LVL III: CPT | Mod: PBBFAC,,, | Performed by: INTERNAL MEDICINE

## 2017-12-14 PROCEDURE — 99215 OFFICE O/P EST HI 40 MIN: CPT | Mod: S$GLB,,, | Performed by: INTERNAL MEDICINE

## 2017-12-14 RX ORDER — PREDNISONE 10 MG/1
10 TABLET ORAL DAILY
COMMUNITY
End: 2017-12-14 | Stop reason: DRUGHIGH

## 2017-12-14 RX ORDER — LISINOPRIL 5 MG/1
TABLET ORAL
COMMUNITY
Start: 2017-10-12 | End: 2017-12-14 | Stop reason: DRUGHIGH

## 2017-12-14 RX ORDER — LISINOPRIL 20 MG/1
20 TABLET ORAL DAILY
COMMUNITY
Start: 2017-10-31

## 2017-12-14 RX ORDER — HYDROCHLOROTHIAZIDE 12.5 MG/1
12.5 CAPSULE ORAL DAILY
COMMUNITY
Start: 2017-12-09 | End: 2019-05-29

## 2017-12-14 RX ORDER — PREDNISONE 5 MG/1
7.5 TABLET ORAL DAILY
Qty: 45 TABLET | Refills: 3 | Status: SHIPPED | OUTPATIENT
Start: 2017-12-14 | End: 2018-05-10 | Stop reason: SDUPTHER

## 2017-12-14 RX ORDER — NIFEDIPINE 60 MG/1
60 TABLET, EXTENDED RELEASE ORAL DAILY
COMMUNITY
Start: 2017-11-20

## 2017-12-14 NOTE — ASSESSMENT & PLAN NOTE
Stable on methotrexate and prednisone.  Reduce prednisone dose to 7.5 Mg Daily.  Safety labs as advised.

## 2017-12-14 NOTE — ASSESSMENT & PLAN NOTE
Hold methotrexate infection.  Safety labs from last week shows normal liver functions.  No neutropenia.

## 2017-12-14 NOTE — PATIENT INSTRUCTIONS
Understanding Lateral Epicondylitis    Tendons are strong bands of tissue that connect muscles to bones. Lateral epicondylitis affects the tendons that connect muscles in the forearm to the lateral epicondyle. This is the bony knob on the outer side of the elbow. The condition occurs if the extensor tendons of the wrist become red and swollen (irritated). This can cause pain in the elbow, forearm, and wrist. Because the condition is sometimes caused by playing tennis, it is also known as tennis elbow.  How to say it  LA-tuhr-suzie ja-qt-DZX-duh-LY-tis   What causes lateral epicondylitis?  The condition most often occurs because of overuse. This can be from any activity that repeatedly puts stress on the forearm extensor muscles or tendons and wrist. For instance, playing tennis, lifting weights, cutting meat, painting, and typing can all cause the condition. Wear and tear of the tendons from aging or an injury to the tendons can also cause the condition.  Symptoms of lateral epicondylitis  The most common symptom is pain. You may feel it on the outer side of the elbow and down the back of the forearm. It may be worse when moving or using the elbow, forearm, or wrist. You may also feel pain when gripping or lifting things.  Treatment for lateral epicondylitis  Treatments may include:  · Resting the elbow, forearm, and wrist. Youll need to avoid movements that can make your symptoms worse. You also may need to avoid certain sports and types of work for a time. This helps relieve symptoms and prevent further damage to the tendons.  · Changing the action that caused the problem. For instance, if the tendons were damaged from playing tennis, it may help to change your playing technique or use different equipment. This helps prevent further damage to the tendons.  · Using cold packs. Putting an ice pack on the injured area can help reduce pain and swelling.  · Taking pain medicines. Taking prescription or  over-the-counter pain medicines may help reduce pain and swelling.    · Wearing a brace. This helps reduce strain on the muscles and tendons in the forearm, which may relieve symptoms. It is very important to wear the brace properly.  · Doing exercises and physical therapy. These help improve strength and range of motion in the elbow, forearm, and wrist.  · Getting shots of medicine into the injured area. These may help relieve symptoms for a time.  · Having surgery. This may be an option if other treatments fail to relieve symptoms. In many cases, the surgeon removes the damaged tissue.  Possible complications of lateral epicondylitis  If the tendons involved dont heal properly, symptoms may return or get worse. To help prevent this, follow your treatment plan as directed.  When to call your healthcare provider  Call your healthcare provider right away if you have any of these:  · Fever of 100.4°F (38°C) or higher, or as directed  · Redness, swelling, or warmth in the elbow or forearm that gets worse  · Symptoms that dont get better with treatment, or get worse  · New symptoms   Date Last Reviewed: 3/10/2016  © 9814-9410 [employer] [address] [place] [id number]. All rights reserved. This information is not intended as a substitute for professional medical care. Always follow your healthcare professional's instructions.
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
## 2017-12-14 NOTE — ASSESSMENT & PLAN NOTE
Significant improvement since being on uloric.  No flareups since last visit.  Check levels and titrate dose of uloric in future.  Continue colchicine 3 days a week-renally dosed.

## 2017-12-17 DIAGNOSIS — M06.09 RHEUMATOID ARTHRITIS OF MULTIPLE SITES WITH NEGATIVE RHEUMATOID FACTOR: ICD-10-CM

## 2017-12-18 RX ORDER — FOLIC ACID 1 MG/1
TABLET ORAL
Qty: 30 TABLET | Refills: 3 | Status: SHIPPED | OUTPATIENT
Start: 2017-12-18 | End: 2018-05-10 | Stop reason: SDUPTHER

## 2017-12-28 ENCOUNTER — OFFICE VISIT (OUTPATIENT)
Dept: INTERNAL MEDICINE | Facility: CLINIC | Age: 77
End: 2017-12-28
Payer: MEDICARE

## 2017-12-28 ENCOUNTER — HOSPITAL ENCOUNTER (OUTPATIENT)
Dept: RADIOLOGY | Facility: HOSPITAL | Age: 77
Discharge: HOME | End: 2017-12-28
Attending: FAMILY MEDICINE
Payer: MEDICARE

## 2017-12-28 VITALS
HEART RATE: 84 BPM | DIASTOLIC BLOOD PRESSURE: 80 MMHG | WEIGHT: 223.13 LBS | OXYGEN SATURATION: 99 % | TEMPERATURE: 96 F | BODY MASS INDEX: 29.57 KG/M2 | SYSTOLIC BLOOD PRESSURE: 134 MMHG | HEIGHT: 73 IN

## 2017-12-28 DIAGNOSIS — J40 SINOBRONCHITIS: ICD-10-CM

## 2017-12-28 DIAGNOSIS — M1A.09X1 IDIOPATHIC CHRONIC GOUT OF MULTIPLE SITES WITH TOPHUS: Chronic | ICD-10-CM

## 2017-12-28 DIAGNOSIS — M06.09 RHEUMATOID ARTHRITIS OF MULTIPLE SITES WITH NEGATIVE RHEUMATOID FACTOR: ICD-10-CM

## 2017-12-28 DIAGNOSIS — E11.22 TYPE 2 DIABETES MELLITUS WITH STAGE 3 CHRONIC KIDNEY DISEASE, WITHOUT LONG-TERM CURRENT USE OF INSULIN: Chronic | ICD-10-CM

## 2017-12-28 DIAGNOSIS — I15.2 HYPERTENSION ASSOCIATED WITH DIABETES: Chronic | ICD-10-CM

## 2017-12-28 DIAGNOSIS — E11.59 HYPERTENSION ASSOCIATED WITH DIABETES: Chronic | ICD-10-CM

## 2017-12-28 DIAGNOSIS — J32.9 SINOBRONCHITIS: Primary | ICD-10-CM

## 2017-12-28 DIAGNOSIS — N18.30 TYPE 2 DIABETES MELLITUS WITH STAGE 3 CHRONIC KIDNEY DISEASE, WITHOUT LONG-TERM CURRENT USE OF INSULIN: Chronic | ICD-10-CM

## 2017-12-28 DIAGNOSIS — J40 SINOBRONCHITIS: Primary | ICD-10-CM

## 2017-12-28 DIAGNOSIS — J32.9 SINOBRONCHITIS: ICD-10-CM

## 2017-12-28 DIAGNOSIS — E78.2 MIXED HYPERLIPIDEMIA: ICD-10-CM

## 2017-12-28 PROCEDURE — 71020 XR CHEST PA AND LATERAL: CPT | Mod: TC,PO

## 2017-12-28 PROCEDURE — 99999 PR PBB SHADOW E&M-EST. PATIENT-LVL III: CPT | Mod: PBBFAC,,, | Performed by: FAMILY MEDICINE

## 2017-12-28 PROCEDURE — 71020 XR CHEST PA AND LATERAL: CPT | Mod: 26,,, | Performed by: RADIOLOGY

## 2017-12-28 PROCEDURE — 99214 OFFICE O/P EST MOD 30 MIN: CPT | Mod: S$GLB,,, | Performed by: FAMILY MEDICINE

## 2017-12-28 PROCEDURE — 99499 UNLISTED E&M SERVICE: CPT | Mod: S$GLB,,, | Performed by: FAMILY MEDICINE

## 2017-12-28 RX ORDER — BENZONATATE 200 MG/1
200 CAPSULE ORAL 3 TIMES DAILY PRN
Qty: 30 CAPSULE | Refills: 0 | Status: SHIPPED | OUTPATIENT
Start: 2017-12-28 | End: 2018-01-07

## 2017-12-28 RX ORDER — ATORVASTATIN CALCIUM 40 MG/1
TABLET, FILM COATED ORAL
COMMUNITY
Start: 2017-12-17

## 2017-12-28 NOTE — PROGRESS NOTES
Subjective:       Patient ID: Pablito Givens Jr. is a 77 y.o. male.    Chief Complaint: Cough (chest congestion)    77-year-old male patient accompanied by his family member with Patient Active Problem List:     Hypertension associated with diabetes     Testosterone deficiency     Osteoarthritis of both knees     Vitamin D insufficiency     Type 2 diabetes mellitus with stage 3 chronic kidney disease     History of kidney cancer     History of total left knee replacement     Neuropathy     Primary osteoarthritis of right elbow     Stage 3 chronic kidney disease     History of nephrectomy, unilateral     Sacroiliitis     Rheumatoid arthritis of multiple sites with negative rheumatoid factor     Idiopathic chronic gout of multiple sites with tophus     Long-term current use of high risk medication other than anticoagulant     Lateral epicondylitis of right elbow  Here with complaint of sore throat with started on 12/24/17 for which he went to urgent care in [place], and was given ceftriaxone injection.  Patient reports that the sore throat has been getting better, still has minimal sore throat but has been coughing a lot , has been taking Mucinex but no significant response noted.  Patient reports that his sugars have been up secondary to being on prednisone for gout and rheumatoid arthritis.   Reports having fatigue but has not been running any fever with chills, reports that his cough has been getting worse, from draining his sinuses  Reports that he had rattling noises in his lungs last night from excessive coughing          Review of Systems   Constitutional: Positive for fatigue. Negative for appetite change, chills and fever.   HENT: Positive for congestion, sinus pressure and sore throat.    Eyes: Negative for visual disturbance.   Respiratory: Positive for cough. Negative for shortness of breath.    Cardiovascular: Negative for chest pain, palpitations and leg swelling.   Gastrointestinal: Negative for  "abdominal pain, nausea and vomiting.   Musculoskeletal: Negative for myalgias.   Skin: Negative for rash.   Neurological: Negative for headaches.   Psychiatric/Behavioral: Negative for sleep disturbance.         /80 (BP Location: Right arm, Patient Position: Sitting)   Pulse 84   Temp 96.2 °F (35.7 °C) (Tympanic)   Ht 6' 1" (1.854 m)   Wt 101.2 kg (223 lb 1.7 oz)   SpO2 99%   BMI 29.44 kg/m²   Objective:      Physical Exam   Constitutional: He is oriented to person, place, and time. He appears well-developed and well-nourished.   HENT:   Head: Normocephalic and atraumatic.   Right Ear: External ear normal.   Left Ear: External ear normal.   Mouth/Throat: No oropharyngeal exudate.   Posterior pharyngeal erythema noted.  No exudates   Neck: Neck supple.   Cardiovascular: Normal rate, regular rhythm and normal heart sounds.    No murmur heard.  Pulmonary/Chest: Effort normal and breath sounds normal. No respiratory distress. He has no wheezes.   Abdominal: Soft. Bowel sounds are normal. There is no tenderness.   Musculoskeletal: He exhibits no edema.   Neurological: He is alert and oriented to person, place, and time.   Skin: Skin is warm and dry. No rash noted.   Psychiatric: He has a normal mood and affect.         Assessment:       1. Sinobronchitis    2. Hypertension associated with diabetes    3. Type 2 diabetes mellitus with stage 3 chronic kidney disease, without long-term current use of insulin    4. Idiopathic chronic gout of multiple sites with tophus    5. Rheumatoid arthritis of multiple sites with negative rheumatoid factor    6. Mixed hyperlipidemia        Plan:   Sinobronchitis  -     X-Ray Chest PA And Lateral; Future; Expected date: 12/28/2017  -     benzonatate (TESSALON) 200 MG capsule; Take 1 capsule (200 mg total) by mouth 3 (three) times daily as needed.  Dispense: 30 capsule; Refill: 0  Likely secondary to sinobronchitis.   Tessalon Perles prescribed today  Patient recently received " ceftriaxone injection  Will check chest x-ray and if positive for infection will consider treating with antibiotics  Tessalon Perles prescribed today for symptomatic relief to suppress cough  Continue over-the-counter Mucinex  Drink adequate fluids  Salt water gargles recommended for ongoing sore throat    Hypertension associated with diabetes-blood pressure stable today currently taking hydrochlorothiazide 12.5 mg lisinopril 20 mg and Procardia 60 mg, followed by Dr. Grimes cardiology in [place]    Type 2 diabetes mellitus with stage 3 chronic kidney disease, without long-term current use of insulin-taking Amaryl 1 mg daily    Idiopathic chronic gout of multiple sites with tophus  Rheumatoid arthritis of multiple sites with negative rheumatoid factor  Followed by rheumatology currently on colchicine 3 times a week, uloric 40 mg daily and prednisone 7.5 mg daily  Takes methotrexate, folic acid and tramadol as needed for rheumatoid arthritis    Mixed hyperlipidemia-currently taking Lipitor 40 mg daily

## 2018-01-08 DIAGNOSIS — E11.22 TYPE 2 DIABETES MELLITUS WITH STAGE 3 CHRONIC KIDNEY DISEASE, WITHOUT LONG-TERM CURRENT USE OF INSULIN: Chronic | ICD-10-CM

## 2018-01-08 DIAGNOSIS — N18.30 TYPE 2 DIABETES MELLITUS WITH STAGE 3 CHRONIC KIDNEY DISEASE, WITHOUT LONG-TERM CURRENT USE OF INSULIN: Chronic | ICD-10-CM

## 2018-01-08 RX ORDER — GLIMEPIRIDE 1 MG/1
1 TABLET ORAL
Qty: 90 TABLET | Refills: 0 | Status: SHIPPED | OUTPATIENT
Start: 2018-01-08 | End: 2018-04-10 | Stop reason: SDUPTHER

## 2018-01-08 NOTE — TELEPHONE ENCOUNTER
Approved but patient needs a follow up appointment in A1c testing.  Please call and schedule with myself or Dr. Eller, patient choice

## 2018-01-22 DIAGNOSIS — M06.09 RHEUMATOID ARTHRITIS OF MULTIPLE SITES WITH NEGATIVE RHEUMATOID FACTOR: ICD-10-CM

## 2018-01-22 DIAGNOSIS — M1A.09X1 IDIOPATHIC CHRONIC GOUT OF MULTIPLE SITES WITH TOPHUS: ICD-10-CM

## 2018-01-22 RX ORDER — TRAMADOL HYDROCHLORIDE 50 MG/1
50 TABLET ORAL
Qty: 30 TABLET | Refills: 0 | Status: SHIPPED | OUTPATIENT
Start: 2018-01-22 | End: 2018-02-28 | Stop reason: SDUPTHER

## 2018-01-23 ENCOUNTER — TELEPHONE (OUTPATIENT)
Dept: RHEUMATOLOGY | Facility: CLINIC | Age: 78
End: 2018-01-23

## 2018-01-23 NOTE — TELEPHONE ENCOUNTER
Left message for Mayda advising her that the application was refaxed and to call back with any additional questions.

## 2018-01-23 NOTE — TELEPHONE ENCOUNTER
----- Message from Mook Roberts sent at 1/23/2018  9:02 AM CST -----  Contact: Waldo OharaLqofodw-[phone]   Pt Would like refills called in on Rx medication Uloric, Pharmacy requesting a new application for this year in order to get refills.  Please call back at [phone]. Thx-

## 2018-01-29 DIAGNOSIS — M06.09 RHEUMATOID ARTHRITIS OF MULTIPLE SITES WITH NEGATIVE RHEUMATOID FACTOR: ICD-10-CM

## 2018-01-29 RX ORDER — METHOTREXATE 2.5 MG/1
TABLET ORAL
Qty: 20 TABLET | Refills: 1 | Status: SHIPPED | OUTPATIENT
Start: 2018-01-29 | End: 2018-02-27 | Stop reason: SDUPTHER

## 2018-02-02 ENCOUNTER — TELEPHONE (OUTPATIENT)
Dept: INTERNAL MEDICINE | Facility: CLINIC | Age: 78
End: 2018-02-02

## 2018-02-02 NOTE — TELEPHONE ENCOUNTER
Spoke to pt. He wanted to know if he can get the flu shot at the pharmacy. Informed pt that would be fine. Pt verbalized understanding

## 2018-02-02 NOTE — TELEPHONE ENCOUNTER
----- Message from Grecia Mathew sent at 2/2/2018  9:03 AM CST -----  Contact: pt  The pt request a call concerning his 2/22 appt, the pt can be reached at [phone]///[id number]

## 2018-02-08 DIAGNOSIS — M1A.09X1 IDIOPATHIC CHRONIC GOUT OF MULTIPLE SITES WITH TOPHUS: ICD-10-CM

## 2018-02-08 RX ORDER — FEBUXOSTAT 40 MG/1
40 TABLET, FILM COATED ORAL DAILY
Qty: 14 TABLET | Refills: 0 | Status: SHIPPED | OUTPATIENT
Start: 2018-02-08 | End: 2019-02-04 | Stop reason: SDUPTHER

## 2018-02-08 NOTE — TELEPHONE ENCOUNTER
----- Message from William Norton sent at 2/8/2018  4:15 PM CST -----  Contact: Mayda, pt's spouse  She's calling in regards to the pt's RX medication: Uloric 40 mg, pt is needing about 2 weeks worth sent to his local pharmacy ([facility] in [facility]) until his RX medication comes in from the mail order, please advise, [phone] (home)

## 2018-02-22 ENCOUNTER — OFFICE VISIT (OUTPATIENT)
Dept: INTERNAL MEDICINE | Facility: CLINIC | Age: 78
End: 2018-02-22
Payer: MEDICARE

## 2018-02-22 ENCOUNTER — LAB VISIT (OUTPATIENT)
Dept: LAB | Facility: HOSPITAL | Age: 78
End: 2018-02-22
Attending: FAMILY MEDICINE
Payer: MEDICARE

## 2018-02-22 VITALS
TEMPERATURE: 97 F | HEART RATE: 76 BPM | SYSTOLIC BLOOD PRESSURE: 134 MMHG | WEIGHT: 227.94 LBS | OXYGEN SATURATION: 98 % | DIASTOLIC BLOOD PRESSURE: 80 MMHG | BODY MASS INDEX: 30.21 KG/M2 | HEIGHT: 73 IN

## 2018-02-22 DIAGNOSIS — M06.09 RHEUMATOID ARTHRITIS OF MULTIPLE SITES WITH NEGATIVE RHEUMATOID FACTOR: ICD-10-CM

## 2018-02-22 DIAGNOSIS — M1A.09X1 IDIOPATHIC CHRONIC GOUT OF MULTIPLE SITES WITH TOPHUS: ICD-10-CM

## 2018-02-22 DIAGNOSIS — I77.9 RIGHT-SIDED CAROTID ARTERY DISEASE: ICD-10-CM

## 2018-02-22 DIAGNOSIS — E78.2 MIXED HYPERLIPIDEMIA: ICD-10-CM

## 2018-02-22 DIAGNOSIS — E11.22 TYPE 2 DIABETES MELLITUS WITH STAGE 3 CHRONIC KIDNEY DISEASE, WITHOUT LONG-TERM CURRENT USE OF INSULIN: Chronic | ICD-10-CM

## 2018-02-22 DIAGNOSIS — N18.30 TYPE 2 DIABETES MELLITUS WITH STAGE 3 CHRONIC KIDNEY DISEASE, WITHOUT LONG-TERM CURRENT USE OF INSULIN: Chronic | ICD-10-CM

## 2018-02-22 DIAGNOSIS — M19.021 PRIMARY OSTEOARTHRITIS OF RIGHT ELBOW: ICD-10-CM

## 2018-02-22 DIAGNOSIS — Z90.5 HISTORY OF NEPHRECTOMY, UNILATERAL: ICD-10-CM

## 2018-02-22 DIAGNOSIS — M25.531 CHRONIC WRIST PAIN, RIGHT: Primary | ICD-10-CM

## 2018-02-22 DIAGNOSIS — I15.2 HYPERTENSION ASSOCIATED WITH DIABETES: Chronic | ICD-10-CM

## 2018-02-22 DIAGNOSIS — E66.9 OBESITY (BMI 30.0-34.9): ICD-10-CM

## 2018-02-22 DIAGNOSIS — G89.29 CHRONIC WRIST PAIN, RIGHT: ICD-10-CM

## 2018-02-22 DIAGNOSIS — E55.9 VITAMIN D DEFICIENCY: ICD-10-CM

## 2018-02-22 DIAGNOSIS — G89.29 CHRONIC PAIN OF RIGHT ELBOW: ICD-10-CM

## 2018-02-22 DIAGNOSIS — L84 CALLUS OF FOOT: ICD-10-CM

## 2018-02-22 DIAGNOSIS — M25.521 CHRONIC PAIN OF RIGHT ELBOW: ICD-10-CM

## 2018-02-22 DIAGNOSIS — M25.531 CHRONIC WRIST PAIN, RIGHT: ICD-10-CM

## 2018-02-22 DIAGNOSIS — B35.1 ONYCHOMYCOSIS OF MULTIPLE TOENAILS WITH TYPE 2 DIABETES MELLITUS: ICD-10-CM

## 2018-02-22 DIAGNOSIS — E11.59 HYPERTENSION ASSOCIATED WITH DIABETES: Chronic | ICD-10-CM

## 2018-02-22 DIAGNOSIS — E11.69 ONYCHOMYCOSIS OF MULTIPLE TOENAILS WITH TYPE 2 DIABETES MELLITUS: ICD-10-CM

## 2018-02-22 DIAGNOSIS — G89.29 CHRONIC WRIST PAIN, RIGHT: Primary | ICD-10-CM

## 2018-02-22 DIAGNOSIS — Z85.528 HISTORY OF KIDNEY CANCER: ICD-10-CM

## 2018-02-22 PROBLEM — E78.5 DYSLIPIDEMIA: Status: ACTIVE | Noted: 2017-11-14

## 2018-02-22 PROBLEM — E78.5 DYSLIPIDEMIA: Status: RESOLVED | Noted: 2017-11-14 | Resolved: 2018-02-22

## 2018-02-22 LAB
25(OH)D3+25(OH)D2 SERPL-MCNC: 32 NG/ML
ANION GAP SERPL CALC-SCNC: 12 MMOL/L
BASOPHILS # BLD AUTO: 0.04 K/UL
BASOPHILS NFR BLD: 0.6 %
BUN SERPL-MCNC: 24 MG/DL
CALCIUM SERPL-MCNC: 10.3 MG/DL
CHLORIDE SERPL-SCNC: 103 MMOL/L
CHOLEST SERPL-MCNC: 112 MG/DL
CHOLEST/HDLC SERPL: 2.5 {RATIO}
CO2 SERPL-SCNC: 28 MMOL/L
CREAT SERPL-MCNC: 1.5 MG/DL
CRP SERPL-MCNC: 2.7 MG/L
DIFFERENTIAL METHOD: ABNORMAL
EOSINOPHIL # BLD AUTO: 0.2 K/UL
EOSINOPHIL NFR BLD: 2.7 %
ERYTHROCYTE [DISTWIDTH] IN BLOOD BY AUTOMATED COUNT: 15.3 %
ERYTHROCYTE [SEDIMENTATION RATE] IN BLOOD BY WESTERGREN METHOD: 4 MM/HR
EST. GFR  (AFRICAN AMERICAN): 51.9 ML/MIN/1.73 M^2
EST. GFR  (NON AFRICAN AMERICAN): 44.9 ML/MIN/1.73 M^2
ESTIMATED AVG GLUCOSE: 120 MG/DL
GLUCOSE SERPL-MCNC: 87 MG/DL
HBA1C MFR BLD HPLC: 5.8 %
HCT VFR BLD AUTO: 43.7 %
HDLC SERPL-MCNC: 44 MG/DL
HDLC SERPL: 39.3 %
HGB BLD-MCNC: 13.8 G/DL
IMM GRANULOCYTES # BLD AUTO: 0.03 K/UL
IMM GRANULOCYTES NFR BLD AUTO: 0.5 %
LDLC SERPL CALC-MCNC: 46.2 MG/DL
LYMPHOCYTES # BLD AUTO: 1 K/UL
LYMPHOCYTES NFR BLD: 14.9 %
MCH RBC QN AUTO: 30.1 PG
MCHC RBC AUTO-ENTMCNC: 31.6 G/DL
MCV RBC AUTO: 95 FL
MONOCYTES # BLD AUTO: 0.7 K/UL
MONOCYTES NFR BLD: 10.2 %
NEUTROPHILS # BLD AUTO: 4.7 K/UL
NEUTROPHILS NFR BLD: 71.1 %
NONHDLC SERPL-MCNC: 68 MG/DL
NRBC BLD-RTO: 0 /100 WBC
PLATELET # BLD AUTO: 280 K/UL
PMV BLD AUTO: 10 FL
POTASSIUM SERPL-SCNC: 4.1 MMOL/L
RBC # BLD AUTO: 4.59 M/UL
SODIUM SERPL-SCNC: 143 MMOL/L
TRIGL SERPL-MCNC: 109 MG/DL
URATE SERPL-MCNC: 6.2 MG/DL
WBC # BLD AUTO: 6.65 K/UL

## 2018-02-22 PROCEDURE — 3008F BODY MASS INDEX DOCD: CPT | Mod: S$GLB,,, | Performed by: FAMILY MEDICINE

## 2018-02-22 PROCEDURE — 83036 HEMOGLOBIN GLYCOSYLATED A1C: CPT

## 2018-02-22 PROCEDURE — 99999 PR PBB SHADOW E&M-EST. PATIENT-LVL IV: CPT | Mod: PBBFAC,,, | Performed by: FAMILY MEDICINE

## 2018-02-22 PROCEDURE — 99214 OFFICE O/P EST MOD 30 MIN: CPT | Mod: 25,S$GLB,, | Performed by: FAMILY MEDICINE

## 2018-02-22 PROCEDURE — 84550 ASSAY OF BLOOD/URIC ACID: CPT

## 2018-02-22 PROCEDURE — 1125F AMNT PAIN NOTED PAIN PRSNT: CPT | Mod: S$GLB,,, | Performed by: FAMILY MEDICINE

## 2018-02-22 PROCEDURE — 96372 THER/PROPH/DIAG INJ SC/IM: CPT | Mod: 59,S$GLB,, | Performed by: FAMILY MEDICINE

## 2018-02-22 PROCEDURE — 80061 LIPID PANEL: CPT

## 2018-02-22 PROCEDURE — 90662 IIV NO PRSV INCREASED AG IM: CPT | Mod: S$GLB,,, | Performed by: FAMILY MEDICINE

## 2018-02-22 PROCEDURE — 99499 UNLISTED E&M SERVICE: CPT | Mod: S$GLB,,, | Performed by: FAMILY MEDICINE

## 2018-02-22 PROCEDURE — 85025 COMPLETE CBC W/AUTO DIFF WBC: CPT

## 2018-02-22 PROCEDURE — 36415 COLL VENOUS BLD VENIPUNCTURE: CPT | Mod: PO

## 2018-02-22 PROCEDURE — 86140 C-REACTIVE PROTEIN: CPT

## 2018-02-22 PROCEDURE — 1159F MED LIST DOCD IN RCRD: CPT | Mod: S$GLB,,, | Performed by: FAMILY MEDICINE

## 2018-02-22 PROCEDURE — G0008 ADMIN INFLUENZA VIRUS VAC: HCPCS | Mod: S$GLB,,, | Performed by: FAMILY MEDICINE

## 2018-02-22 PROCEDURE — 85651 RBC SED RATE NONAUTOMATED: CPT | Mod: PO

## 2018-02-22 PROCEDURE — 82306 VITAMIN D 25 HYDROXY: CPT

## 2018-02-22 PROCEDURE — 80048 BASIC METABOLIC PNL TOTAL CA: CPT

## 2018-02-22 RX ORDER — KETOROLAC TROMETHAMINE 30 MG/ML
30 INJECTION, SOLUTION INTRAMUSCULAR; INTRAVENOUS
Status: COMPLETED | OUTPATIENT
Start: 2018-02-22 | End: 2018-02-22

## 2018-02-22 RX ADMIN — KETOROLAC TROMETHAMINE 30 MG: 30 INJECTION, SOLUTION INTRAMUSCULAR; INTRAVENOUS at 12:02

## 2018-02-22 NOTE — PROGRESS NOTES
Subjective:       Patient ID: Pablito Givens Jr. is a 77 y.o. male.    Chief Complaint: Follow-up (A1C)    77-year-old male patient accompanied by his wife with Patient Active Problem List:     Hypertension associated with diabetes     Testosterone deficiency     Osteoarthritis of both knees     Vitamin D insufficiency     Type 2 diabetes mellitus with stage 3 chronic kidney disease     History of kidney cancer     History of total left knee replacement     Neuropathy     Primary osteoarthritis of right elbow     Stage 3 chronic kidney disease     History of nephrectomy, unilateral     Sacroiliitis     Rheumatoid arthritis of multiple sites with negative rheumatoid factor     Idiopathic chronic gout of multiple sites with tophus     Long-term current use of high risk medication other than anticoagulant     Lateral epicondylitis of right elbow     Mixed hyperlipidemia     Obesity (BMI 30.0-34.9)     Right-sided carotid artery disease  Here with complaint of severe pain to his right wrist, right elbow and right shoulder, reports pain as 10/10, reports that he has been taking tramadol daily but no significant response noted  Patient denies of any injury or trauma  Denies of any dizziness secondary to right-sided carotid artery disease  Denies of any recent gout exacerbations and has been taking his gout medication regularly  Sugars have been stable  Denies of any polyuria polydipsia polyphagia, but concerned about severe ongoing joint pains.         Review of Systems   Constitutional: Negative for appetite change and fatigue.   Eyes: Negative for visual disturbance.   Respiratory: Negative for shortness of breath.    Cardiovascular: Negative for chest pain, palpitations and leg swelling.   Gastrointestinal: Negative for abdominal pain, nausea and vomiting.   Endocrine: Negative for polydipsia, polyphagia and polyuria.   Musculoskeletal: Positive for arthralgias and myalgias. Negative for joint swelling.   Skin: Negative  "for rash.   Neurological: Positive for numbness. Negative for weakness and headaches.   Psychiatric/Behavioral: Negative for sleep disturbance.         /80 (BP Location: Right arm, Patient Position: Sitting)   Pulse 76   Temp 97.2 °F (36.2 °C) (Tympanic)   Ht 6' 1" (1.854 m)   Wt 103.4 kg (227 lb 15.3 oz)   SpO2 98%   BMI 30.08 kg/m²   Objective:      Physical Exam   Constitutional: He is oriented to person, place, and time. He appears well-developed and well-nourished.   HENT:   Head: Normocephalic and atraumatic.   Mouth/Throat: Oropharynx is clear and moist.   Cardiovascular: Normal rate, regular rhythm and normal heart sounds.    No murmur heard.  Pulses:       Dorsalis pedis pulses are 1+ on the right side, and 1+ on the left side.   Pulmonary/Chest: Effort normal and breath sounds normal. He has no wheezes.   Abdominal: Soft. Bowel sounds are normal. There is no tenderness.   Musculoskeletal: He exhibits tenderness. He exhibits no edema.   Positive for tenderness to the right wrist radially and in the middle dorsally, right elbow posteriorly and right shoulder posteriorly on palpation.  No erythema or warmth noted   Feet:   Right Foot:   Protective Sensation: 10 sites tested. 10 sites sensed.   Skin Integrity: Positive for dry skin.   Left Foot:   Protective Sensation: 10 sites tested. 10 sites sensed.   Skin Integrity: Positive for callus. Negative for dry skin.   Neurological: He is alert and oriented to person, place, and time.   Skin: Skin is warm and dry. No rash noted.   Psychiatric: He has a normal mood and affect.         Assessment:       1. Chronic wrist pain, right    2. Chronic pain of right elbow    3. Primary osteoarthritis of right elbow    4. Rheumatoid arthritis of multiple sites with negative rheumatoid factor    5. Idiopathic chronic gout of multiple sites with tophus    6. Hypertension associated with diabetes    7. Type 2 diabetes mellitus with stage 3 chronic kidney disease, " without long-term current use of insulin    8. Mixed hyperlipidemia    9. Obesity (BMI 30.0-34.9)    10. History of kidney cancer    11. History of nephrectomy, unilateral    12. Right-sided carotid artery disease    13. Callus of foot    14. Onychomycosis of multiple toenails with type 2 diabetes mellitus    15. Vitamin D deficiency        Plan:   Chronic wrist pain, right  -     CBC auto differential; Future; Expected date: 02/22/2018  -     Sedimentation rate, manual; Future; Expected date: 02/22/2018  -     C-reactive protein; Future; Expected date: 02/22/2018  -     ketorolac injection 30 mg; Inject 1 mL (30 mg total) into the muscle one time.  Chronic pain of right elbow  -     CBC auto differential; Future; Expected date: 02/22/2018  -     Sedimentation rate, manual; Future; Expected date: 02/22/2018  -     C-reactive protein; Future; Expected date: 02/22/2018  -     ketorolac injection 30 mg; Inject 1 mL (30 mg total) into the muscle one time.  Primary osteoarthritis of right elbow  Rheumatoid arthritis of multiple sites with negative rheumatoid factor  -     CBC auto differential; Future; Expected date: 02/22/2018  -     Sedimentation rate, manual; Future; Expected date: 02/22/2018  -     ketorolac injection 30 mg; Inject 1 mL (30 mg total) into the muscle one time.  Idiopathic chronic gout of multiple sites with tophus  -     Uric acid; Future; Expected date: 02/22/2018    Secondary to severe acute and chronic multiple joint pains to the right wrist, elbow and shoulder, Toradol shot given today in the clinic  Patient was advised to take Tylenol/tramadol as needed for pain continue methotrexate and prednisone and follow-up with rheumatology sooner for ongoing pains, could be secondary to exacerbation  Patient also taking colchicine as needed and Uloric 40 mg for gout  Drink adequate fluids      Hypertension associated with diabetes  -     Basic metabolic panel; Future; Expected date: 02/22/2018  -     Lipid  panel; Future; Expected date: 02/22/2018  Blood pressures stable today currently on hydrochlorothiazide 12.5 mg, lisinopril 20 mg daily and nifedipine 60 mg daily  Restrict salt intake and eat low-fat and low-cholesterol diet    Type 2 diabetes mellitus with stage 3 chronic kidney disease, without long-term current use of insulin  -     Basic metabolic panel; Future; Expected date: 02/22/2018  -     Lipid panel; Future; Expected date: 02/22/2018  -     Hemoglobin A1c; Future; Expected date: 02/22/2018  -     Cancel: Microalbumin/creatinine urine ratio; Future; Expected date: 02/22/2018  -     Microalbumin/creatinine urine ratio; Future; Expected date: 02/22/2018  Currently on Amaryl 1 mg daily diabetic foot exam done today, will refer to podiatry for callus and onychomycosis  Strict lifestyle changes recommended with 1800 ADA low-fat and low-cholesterol diet and exercise 30 minutes daily    patient was advised to keep up-to-date with eye exams    Mixed hyperlipidemia-currently on Lipitor 40 mg , will check fasting labs     Obesity (BMI 30.0-34.9)-lifestyle modifications recommended to lose weight with BMI 30     History of kidney cancer  History of nephrectomy, unilateral   stable and asymptomatic    Right-sided carotid artery disease-last carotid Doppler done showed 50-69% stenosis , clinically asymptomatic and to be followed by vascular     Callus of foot  -     Ambulatory referral to Podiatry  Onychomycosis of multiple toenails with type 2 diabetes mellitus  -     Ambulatory referral to Podiatry   patient to follow-up with podiatry for callus removal and discuss further about onychomycosis    Vitamin D deficiency  -     Vitamin D; Future; Expected date: 02/22/2018  Currently on vitamin D by prescription weekly     Other orders  -     Influenza - High Dose (65+) (PF) (IM)  Flu shot given today

## 2018-02-26 ENCOUNTER — TELEPHONE (OUTPATIENT)
Dept: RHEUMATOLOGY | Facility: CLINIC | Age: 78
End: 2018-02-26

## 2018-02-26 NOTE — TELEPHONE ENCOUNTER
Spoke with pt and he is having severe pain in both his arms. States that the right side is worse. Rates his pain at  90 on a scale of 0 to 10. Pt saw his PCP on 2.22.18 and was given a Toradol injection which did not provide any relief. Pt states that this has been going on for a few months now. Currently on 7.5 mg of prednisone daily,10 mg Methotrexate weekly, colchicine 0.6 mg daily and uloric 40 mg daily. States that he has severe pain when using his his arm or even bumping it. Would like to know what else could be done and if the rheumatoid nodules could be removed surgically. Please Advise

## 2018-02-26 NOTE — TELEPHONE ENCOUNTER
----- Message from Brigitte Burris sent at 2/26/2018 11:21 AM CST -----  Contact: pt  He's calling stating that he is having pain in both arms and wants to know what he can do, please advise [phone] (home)

## 2018-02-26 NOTE — TELEPHONE ENCOUNTER
Spoke with pt and scheduled appointment with Lilly Foley PA-C for 2.27.18 at 10.30 am. Pt verbalized understanding.

## 2018-02-27 ENCOUNTER — OFFICE VISIT (OUTPATIENT)
Dept: RHEUMATOLOGY | Facility: CLINIC | Age: 78
End: 2018-02-27
Payer: MEDICARE

## 2018-02-27 VITALS
WEIGHT: 227.5 LBS | BODY MASS INDEX: 30.15 KG/M2 | HEIGHT: 73 IN | HEART RATE: 75 BPM | SYSTOLIC BLOOD PRESSURE: 151 MMHG | DIASTOLIC BLOOD PRESSURE: 88 MMHG

## 2018-02-27 DIAGNOSIS — M1A.09X1 IDIOPATHIC CHRONIC GOUT OF MULTIPLE SITES WITH TOPHUS: ICD-10-CM

## 2018-02-27 DIAGNOSIS — M06.9 RHEUMATOID ARTHRITIS FLARE: ICD-10-CM

## 2018-02-27 DIAGNOSIS — F51.04 CHRONIC INSOMNIA: ICD-10-CM

## 2018-02-27 DIAGNOSIS — M06.09 RHEUMATOID ARTHRITIS OF MULTIPLE SITES WITH NEGATIVE RHEUMATOID FACTOR: Primary | ICD-10-CM

## 2018-02-27 PROCEDURE — 99214 OFFICE O/P EST MOD 30 MIN: CPT | Mod: 25,S$GLB,, | Performed by: PHYSICIAN ASSISTANT

## 2018-02-27 PROCEDURE — 1159F MED LIST DOCD IN RCRD: CPT | Mod: S$GLB,,, | Performed by: PHYSICIAN ASSISTANT

## 2018-02-27 PROCEDURE — 96372 THER/PROPH/DIAG INJ SC/IM: CPT | Mod: S$GLB,,, | Performed by: INTERNAL MEDICINE

## 2018-02-27 PROCEDURE — 99999 PR PBB SHADOW E&M-EST. PATIENT-LVL IV: CPT | Mod: PBBFAC,,, | Performed by: PHYSICIAN ASSISTANT

## 2018-02-27 PROCEDURE — 3008F BODY MASS INDEX DOCD: CPT | Mod: S$GLB,,, | Performed by: PHYSICIAN ASSISTANT

## 2018-02-27 PROCEDURE — 99499 UNLISTED E&M SERVICE: CPT | Mod: S$GLB,,, | Performed by: PHYSICIAN ASSISTANT

## 2018-02-27 PROCEDURE — 1125F AMNT PAIN NOTED PAIN PRSNT: CPT | Mod: S$GLB,,, | Performed by: PHYSICIAN ASSISTANT

## 2018-02-27 RX ORDER — BETAMETHASONE SODIUM PHOSPHATE AND BETAMETHASONE ACETATE 3; 3 MG/ML; MG/ML
6 INJECTION, SUSPENSION INTRA-ARTICULAR; INTRALESIONAL; INTRAMUSCULAR; SOFT TISSUE
Status: COMPLETED | OUTPATIENT
Start: 2018-02-27 | End: 2018-02-27

## 2018-02-27 RX ORDER — ZOLPIDEM TARTRATE 5 MG/1
TABLET ORAL
Qty: 90 TABLET | Refills: 0 | Status: SHIPPED | OUTPATIENT
Start: 2018-02-27 | End: 2018-06-03 | Stop reason: SDUPTHER

## 2018-02-27 RX ORDER — METHOTREXATE 2.5 MG/1
15 TABLET ORAL
Qty: 30 TABLET | Refills: 2 | Status: SHIPPED | OUTPATIENT
Start: 2018-02-27 | End: 2018-07-25 | Stop reason: SDUPTHER

## 2018-02-27 RX ADMIN — BETAMETHASONE SODIUM PHOSPHATE AND BETAMETHASONE ACETATE 6 MG: 3; 3 INJECTION, SUSPENSION INTRA-ARTICULAR; INTRALESIONAL; INTRAMUSCULAR; SOFT TISSUE at 11:02

## 2018-02-27 ASSESSMENT — ROUTINE ASSESSMENT OF PATIENT INDEX DATA (RAPID3): MDHAQ FUNCTION SCORE: 0

## 2018-02-27 NOTE — PATIENT INSTRUCTIONS
Increase methotrexate to 15mg/week (6 pills), with daily folic acid    Steroid shot today in the muscle    Give two days and let us know if any better    REST    Continue prednisone 7.5mg/day     No ibuprofen, no aleve, no motrin    Tylenol is ok, tramadol is ok

## 2018-02-27 NOTE — PROGRESS NOTES
Administered 1cc Betamethasone 6mg/cc to  RUOQ GluteaL. Pt. Tolerated well. No acute reaction noted to site. Pt. Instructed on S/S to report. Pt. Verbalized understanding. Pt waited 15 minutes.    Lot: [id number]  Exp: 07/19

## 2018-02-27 NOTE — PROGRESS NOTES
"Subjective:       Patient ID: Pablito Givens Jr. is a 77 y.o. male.    Chief Complaint: Rheumatoid Arthritis    Caprice is a 78 y/o patient of Dr. Castro seen for chronic gout and seronegative RA. He is on mtx 10mg/week and prednisone 7.5mg/d (lowered from 10mg/day last visit) for RA. He takes uloric 40mg/d for chronic gout.  He saw Dr. ALFONSO 2 months ago with pain in his right elbow, conservative therapy was rec'd for lateral epicondylitis. Hasn't improved. He is very active and does lots of work with his hands, this doesn't help his pain.     He is here today urgently with severe pain in his right shoulder, right elbow, right wrist that has just worsened since last seen.  Also pain in his left elbow and shoulder. Pain is 10/10. He saw his pcp for pain in his right wrist, elbow and shoulder.  He was given a toradol shot without relief. Labs checked and inflammatory markers were normal.  He is taking tramadol for pain without relief. No nsaids due to ckd.       Review of Systems   Constitutional: Negative for chills, fatigue, fever and unexpected weight change.   HENT: Negative for congestion, mouth sores and rhinorrhea.    Eyes: Negative for pain, discharge and redness.   Respiratory: Negative for cough and shortness of breath.    Cardiovascular: Negative for chest pain and leg swelling.   Gastrointestinal: Negative for abdominal pain, diarrhea, nausea and vomiting.   Endocrine: Negative for cold intolerance and heat intolerance.   Genitourinary: Negative for dysuria.   Musculoskeletal: Positive for arthralgias. Negative for joint swelling and myalgias.   Skin: Negative for rash.   Allergic/Immunologic: Negative for immunocompromised state.   Neurological: Negative for weakness and headaches.   Psychiatric/Behavioral: The patient is not nervous/anxious.          Objective:   BP (!) 151/88   Pulse 75   Ht 6' 1" (1.854 m)   Wt 103.2 kg (227 lb 8.2 oz)   BMI 30.02 kg/m²      Physical Exam   Constitutional: He is oriented " to person, place, and time and well-developed, well-nourished, and in no distress.   HENT:   Head: Normocephalic and atraumatic.   Eyes: Pupils are equal, round, and reactive to light. Right eye exhibits no discharge.   Neck: Normal range of motion.   Cardiovascular: Normal rate, regular rhythm and normal heart sounds.  Exam reveals no friction rub.    Pulmonary/Chest: Effort normal and breath sounds normal. No respiratory distress.   Abdominal: Soft. He exhibits no distension. There is no tenderness.   Lymphadenopathy:     He has no cervical adenopathy.   Neurological: He is alert and oriented to person, place, and time.   Skin: No rash noted. No erythema.     Psychiatric: Mood normal.   Musculoskeletal: Normal range of motion. He exhibits no edema or deformity.   Right shoulder tender, no effusion, full range of motion neg impingement sing  Right elbow lateral epicondyle tenderness, mild synovitis  Right wrist mild synovitis, +tenderness dorsal aspect radial styloid  Left wrist mild synovitis +tenderness dorsally  Left elbow +tender +mild effusion  Left shoulder tender, full rom, no effusion     rod knees replaced, normal  rod ankles no effusion warmth or tenderness           Recent Results (from the past 672 hour(s))   Microalbumin/creatinine urine ratio    Collection Time: 02/22/18 12:09 PM   Result Value Ref Range    Microalbum.,U,Random 88.0 ug/mL    Creatinine, Random Ur 99.0 23.0 - 375.0 mg/dL    Microalb Creat Ratio 88.9 (H) 0.0 - 30.0 ug/mg   CBC auto differential    Collection Time: 02/22/18 12:25 PM   Result Value Ref Range    WBC 6.65 3.90 - 12.70 K/uL    RBC 4.59 (L) 4.60 - 6.20 M/uL    Hemoglobin 13.8 (L) 14.0 - 18.0 g/dL    Hematocrit 43.7 40.0 - 54.0 %    MCV 95 82 - 98 fL    MCH 30.1 27.0 - 31.0 pg    MCHC 31.6 (L) 32.0 - 36.0 g/dL    RDW 15.3 (H) 11.5 - 14.5 %    Platelets 280 150 - 350 K/uL    MPV 10.0 9.2 - 12.9 fL    Immature Granulocytes 0.5 0.0 - 0.5 %    Gran # (ANC) 4.7 1.8 - 7.7 K/uL     Immature Grans (Abs) 0.03 0.00 - 0.04 K/uL    Lymph # 1.0 1.0 - 4.8 K/uL    Mono # 0.7 0.3 - 1.0 K/uL    Eos # 0.2 0.0 - 0.5 K/uL    Baso # 0.04 0.00 - 0.20 K/uL    nRBC 0 0 /100 WBC    Gran% 71.1 38.0 - 73.0 %    Lymph% 14.9 (L) 18.0 - 48.0 %    Mono% 10.2 4.0 - 15.0 %    Eosinophil% 2.7 0.0 - 8.0 %    Basophil% 0.6 0.0 - 1.9 %    Differential Method Automated    Sedimentation rate, manual    Collection Time: 02/22/18 12:25 PM   Result Value Ref Range    Sed Rate 4 0 - 10 mm/Hr   C-reactive protein    Collection Time: 02/22/18 12:25 PM   Result Value Ref Range    CRP 2.7 0.0 - 8.2 mg/L   Basic metabolic panel    Collection Time: 02/22/18 12:25 PM   Result Value Ref Range    Sodium 143 136 - 145 mmol/L    Potassium 4.1 3.5 - 5.1 mmol/L    Chloride 103 95 - 110 mmol/L    CO2 28 23 - 29 mmol/L    Glucose 87 70 - 110 mg/dL    BUN, Bld 24 (H) 8 - 23 mg/dL    Creatinine 1.5 (H) 0.5 - 1.4 mg/dL    Calcium 10.3 8.7 - 10.5 mg/dL    Anion Gap 12 8 - 16 mmol/L    eGFR if African American 51.9 (A) >60 mL/min/1.73 m^2    eGFR if non African American 44.9 (A) >60 mL/min/1.73 m^2   Lipid panel    Collection Time: 02/22/18 12:25 PM   Result Value Ref Range    Cholesterol 112 (L) 120 - 199 mg/dL    Triglycerides 109 30 - 150 mg/dL    HDL 44 40 - 75 mg/dL    LDL Cholesterol 46.2 (L) 63.0 - 159.0 mg/dL    HDL/Chol Ratio 39.3 20.0 - 50.0 %    Total Cholesterol/HDL Ratio 2.5 2.0 - 5.0    Non-HDL Cholesterol 68 mg/dL   Hemoglobin A1c    Collection Time: 02/22/18 12:25 PM   Result Value Ref Range    Hemoglobin A1C 5.8 (H) 4.0 - 5.6 %    Estimated Avg Glucose 120 68 - 131 mg/dL   Uric acid    Collection Time: 02/22/18 12:25 PM   Result Value Ref Range    Uric Acid 6.2 3.4 - 7.0 mg/dL   Vitamin D    Collection Time: 02/22/18 12:25 PM   Result Value Ref Range    Vit D, 25-Hydroxy 32 30 - 96 ng/mL       Assessment:       1. Rheumatoid arthritis of multiple sites with negative rheumatoid factor    2. Idiopathic chronic gout of multiple sites  with tophus    3. Rheumatoid arthritis flare        Impression:  Right shoulder pain, right elbow, right wrist pain: due to RA exacerbation and overlapping OA likely in the shoulder, prev injections in right elbow    Seronegative RA: with acute exacerbation of rod elbow and wrist,  with lower dose of prednisoine 7.5, mtx 10mg/week, 4 swollen joints, 6 tender joints cdai 24, mhaq 0    Chronic gout: not the issue today, stable on uloric    Plan:       IM celestone today 6mg  Increase mtx to 15mg/week, no higher due to kidney disease   Cont prednisone 7.5mg/day if no improvement will increase back to 10mg/day   Consider adding low dose ssz 500mg bid  Ok for tramadol prn   No nsaids   Consider injecting shoulder and wrist if pain continues, would avoid lat epicondyle injections due to potential for tendon injury, has had injections in the past    Case presented to Dr Zepeda He was present in the exam room. PE done independently. Impression and Plan done in collaboration    rtc in 5 wks for routine visit

## 2018-02-28 DIAGNOSIS — M1A.09X1 IDIOPATHIC CHRONIC GOUT OF MULTIPLE SITES WITH TOPHUS: ICD-10-CM

## 2018-02-28 DIAGNOSIS — M06.09 RHEUMATOID ARTHRITIS OF MULTIPLE SITES WITH NEGATIVE RHEUMATOID FACTOR: ICD-10-CM

## 2018-02-28 RX ORDER — TRAMADOL HYDROCHLORIDE 50 MG/1
TABLET ORAL
Qty: 30 TABLET | Refills: 0 | Status: SHIPPED | OUTPATIENT
Start: 2018-02-28 | End: 2018-07-18 | Stop reason: SDUPTHER

## 2018-03-01 NOTE — TELEPHONE ENCOUNTER
----- Message from Mook Roberts sent at 3/1/2018  1:47 PM CST -----  Contact: [phone]   Pt would like to consult with the nurse, personal.  Please call  Back at [phone].  Thx-AH

## 2018-03-05 ENCOUNTER — TELEPHONE (OUTPATIENT)
Dept: RHEUMATOLOGY | Facility: CLINIC | Age: 78
End: 2018-03-05

## 2018-03-05 NOTE — TELEPHONE ENCOUNTER
Unable to call on Thursday but states that in the injection did not help at all. States that at times it seems like it is worse than before, especially the right shoulder. Would like to know what else to do. Pt is unable to come in for same day appointment due to transportation. Please Advise.

## 2018-03-05 NOTE — TELEPHONE ENCOUNTER
----- Message from Abdullahi Giang sent at 3/5/2018  9:32 AM CST -----  Contact: Pt  Pt states he is returning the nurse call please contact the pt at [phone]

## 2018-03-06 ENCOUNTER — PROCEDURE VISIT (OUTPATIENT)
Dept: RHEUMATOLOGY | Facility: CLINIC | Age: 78
End: 2018-03-06
Payer: MEDICARE

## 2018-03-06 DIAGNOSIS — M06.09 RHEUMATOID ARTHRITIS OF MULTIPLE SITES WITH NEGATIVE RHEUMATOID FACTOR: Primary | ICD-10-CM

## 2018-03-06 PROCEDURE — 20605 DRAIN/INJ JOINT/BURSA W/O US: CPT | Mod: 51,RT,S$GLB, | Performed by: PHYSICIAN ASSISTANT

## 2018-03-06 PROCEDURE — 20610 DRAIN/INJ JOINT/BURSA W/O US: CPT | Mod: RT,S$GLB,, | Performed by: PHYSICIAN ASSISTANT

## 2018-03-06 RX ORDER — METHYLPREDNISOLONE ACETATE 80 MG/ML
40 INJECTION, SUSPENSION INTRA-ARTICULAR; INTRALESIONAL; INTRAMUSCULAR; SOFT TISSUE
Status: COMPLETED | OUTPATIENT
Start: 2018-03-06 | End: 2018-03-06

## 2018-03-06 RX ORDER — BETAMETHASONE SODIUM PHOSPHATE AND BETAMETHASONE ACETATE 3; 3 MG/ML; MG/ML
1.5 INJECTION, SUSPENSION INTRA-ARTICULAR; INTRALESIONAL; INTRAMUSCULAR; SOFT TISSUE
Status: COMPLETED | OUTPATIENT
Start: 2018-03-06 | End: 2018-03-06

## 2018-03-06 RX ORDER — METHYLPREDNISOLONE ACETATE 80 MG/ML
80 INJECTION, SUSPENSION INTRA-ARTICULAR; INTRALESIONAL; INTRAMUSCULAR; SOFT TISSUE
Status: COMPLETED | OUTPATIENT
Start: 2018-03-06 | End: 2018-03-06

## 2018-03-06 RX ORDER — BETAMETHASONE SODIUM PHOSPHATE AND BETAMETHASONE ACETATE 3; 3 MG/ML; MG/ML
3 INJECTION, SUSPENSION INTRA-ARTICULAR; INTRALESIONAL; INTRAMUSCULAR; SOFT TISSUE
Status: COMPLETED | OUTPATIENT
Start: 2018-03-06 | End: 2018-03-06

## 2018-03-06 RX ADMIN — METHYLPREDNISOLONE ACETATE 80 MG: 80 INJECTION, SUSPENSION INTRA-ARTICULAR; INTRALESIONAL; INTRAMUSCULAR; SOFT TISSUE at 02:03

## 2018-03-06 RX ADMIN — BETAMETHASONE SODIUM PHOSPHATE AND BETAMETHASONE ACETATE 3 MG: 3; 3 INJECTION, SUSPENSION INTRA-ARTICULAR; INTRALESIONAL; INTRAMUSCULAR; SOFT TISSUE at 02:03

## 2018-03-06 RX ADMIN — BETAMETHASONE SODIUM PHOSPHATE AND BETAMETHASONE ACETATE 1.5 MG: 3; 3 INJECTION, SUSPENSION INTRA-ARTICULAR; INTRALESIONAL; INTRAMUSCULAR; SOFT TISSUE at 02:03

## 2018-03-06 RX ADMIN — METHYLPREDNISOLONE ACETATE 40 MG: 80 INJECTION, SUSPENSION INTRA-ARTICULAR; INTRALESIONAL; INTRAMUSCULAR; SOFT TISSUE at 02:03

## 2018-03-06 NOTE — PROCEDURES
Procedures     Procedure note: After verbal consent was obtained.  The right shoulder was prepared with sterile prep.  The skin was anesthetized with 1% ethyl chloride.  The shoulder joint was injected with 2.5 cc of 1% lidocaine to anesthetize the joint.  The shoulder joint was then injected with 3mg celestone/soluspan, 80mg  Depo-Medrol and 1.0 mL of 1% lidocaine.  Hemostasis was obtained.  The patient tolerated procedure well with no complications.  Patient discharged with icing instructions      Procedure note: After verbal consent was obtained.  The right wrist was prepared with sterile prep.  The skin was anesthetized with 1% ethyl chloride.  The wrists joint was injected with 0.5 mL of 1% lidocaine to anesthetize the area.  The joint was then injected with 0.25 mL celestone/soluspan 6mg/ mL, 0.5.0 mL Depo-Medrol 80 mg/mL and 0.5 mL of 1% lidocaine.  Hemostasis was obtained.  The patient tolerated procedure well with no complications.  Patient diischarged with icing instructions

## 2018-03-12 ENCOUNTER — TELEPHONE (OUTPATIENT)
Dept: RHEUMATOLOGY | Facility: CLINIC | Age: 78
End: 2018-03-12

## 2018-03-12 DIAGNOSIS — M77.11 LATERAL EPICONDYLITIS OF RIGHT ELBOW: ICD-10-CM

## 2018-03-12 DIAGNOSIS — M06.09 RHEUMATOID ARTHRITIS OF MULTIPLE SITES WITH NEGATIVE RHEUMATOID FACTOR: Primary | ICD-10-CM

## 2018-03-12 NOTE — TELEPHONE ENCOUNTER
Lilly Foley PA-C   You 14 minutes ago (11:49 AM)     Ortho and pt referral in (Routing comment)

## 2018-03-12 NOTE — TELEPHONE ENCOUNTER
----- Message from Roxanna Vaz sent at 3/12/2018 10:18 AM CDT -----  Contact: patient  Calling concerning a reaction from the shot taken. Please call patient ASAP @ [phone]. Thanks,phi

## 2018-03-12 NOTE — TELEPHONE ENCOUNTER
Spoke with pt and advised him of appointment scheduled with Dr. Hu in [place] for 3-21-18 at 10.15 am. Pt verbalized understanding.

## 2018-03-16 DIAGNOSIS — M25.521 RIGHT ELBOW PAIN: Primary | ICD-10-CM

## 2018-03-21 ENCOUNTER — OFFICE VISIT (OUTPATIENT)
Dept: FAMILY MEDICINE | Facility: CLINIC | Age: 78
End: 2018-03-21
Payer: MEDICARE

## 2018-03-21 ENCOUNTER — HOSPITAL ENCOUNTER (OUTPATIENT)
Dept: RADIOLOGY | Facility: HOSPITAL | Age: 78
Discharge: HOME | End: 2018-03-21
Attending: ORTHOPAEDIC SURGERY
Payer: MEDICARE

## 2018-03-21 VITALS
SYSTOLIC BLOOD PRESSURE: 150 MMHG | BODY MASS INDEX: 29.95 KG/M2 | WEIGHT: 226 LBS | HEIGHT: 73 IN | TEMPERATURE: 98 F | DIASTOLIC BLOOD PRESSURE: 93 MMHG | HEART RATE: 89 BPM

## 2018-03-21 DIAGNOSIS — M06.09 RHEUMATOID ARTHRITIS OF MULTIPLE SITES WITH NEGATIVE RHEUMATOID FACTOR: ICD-10-CM

## 2018-03-21 DIAGNOSIS — I77.9 RIGHT-SIDED CAROTID ARTERY DISEASE: ICD-10-CM

## 2018-03-21 DIAGNOSIS — E78.2 MIXED HYPERLIPIDEMIA: ICD-10-CM

## 2018-03-21 DIAGNOSIS — M19.021 PRIMARY OSTEOARTHRITIS OF RIGHT ELBOW: ICD-10-CM

## 2018-03-21 DIAGNOSIS — E34.9 TESTOSTERONE DEFICIENCY: ICD-10-CM

## 2018-03-21 DIAGNOSIS — M1A.09X1 IDIOPATHIC CHRONIC GOUT OF MULTIPLE SITES WITH TOPHUS: ICD-10-CM

## 2018-03-21 DIAGNOSIS — E11.22 TYPE 2 DIABETES MELLITUS WITH STAGE 3 CHRONIC KIDNEY DISEASE, WITHOUT LONG-TERM CURRENT USE OF INSULIN: Chronic | ICD-10-CM

## 2018-03-21 DIAGNOSIS — I15.2 HYPERTENSION ASSOCIATED WITH DIABETES: Chronic | ICD-10-CM

## 2018-03-21 DIAGNOSIS — N18.30 TYPE 2 DIABETES MELLITUS WITH STAGE 3 CHRONIC KIDNEY DISEASE, WITHOUT LONG-TERM CURRENT USE OF INSULIN: Chronic | ICD-10-CM

## 2018-03-21 DIAGNOSIS — M25.521 RIGHT ELBOW PAIN: ICD-10-CM

## 2018-03-21 DIAGNOSIS — Z85.528 HISTORY OF KIDNEY CANCER: ICD-10-CM

## 2018-03-21 DIAGNOSIS — E11.59 HYPERTENSION ASSOCIATED WITH DIABETES: Chronic | ICD-10-CM

## 2018-03-21 DIAGNOSIS — L84 CALLUS: Primary | ICD-10-CM

## 2018-03-21 DIAGNOSIS — N18.30 STAGE 3 CHRONIC KIDNEY DISEASE: Chronic | ICD-10-CM

## 2018-03-21 DIAGNOSIS — Z90.5 HISTORY OF NEPHRECTOMY, UNILATERAL: ICD-10-CM

## 2018-03-21 DIAGNOSIS — E11.69 ONYCHOMYCOSIS OF MULTIPLE TOENAILS WITH TYPE 2 DIABETES MELLITUS: ICD-10-CM

## 2018-03-21 DIAGNOSIS — Z79.899 LONG-TERM CURRENT USE OF HIGH RISK MEDICATION OTHER THAN ANTICOAGULANT: ICD-10-CM

## 2018-03-21 DIAGNOSIS — E55.9 VITAMIN D INSUFFICIENCY: Chronic | ICD-10-CM

## 2018-03-21 DIAGNOSIS — M17.0 OSTEOARTHRITIS OF BOTH KNEES, UNSPECIFIED OSTEOARTHRITIS TYPE: Chronic | ICD-10-CM

## 2018-03-21 DIAGNOSIS — M46.1 SACROILIITIS: ICD-10-CM

## 2018-03-21 DIAGNOSIS — B35.1 ONYCHOMYCOSIS OF MULTIPLE TOENAILS WITH TYPE 2 DIABETES MELLITUS: ICD-10-CM

## 2018-03-21 DIAGNOSIS — Z96.652 HISTORY OF TOTAL LEFT KNEE REPLACEMENT: ICD-10-CM

## 2018-03-21 DIAGNOSIS — G62.9 NEUROPATHY: Chronic | ICD-10-CM

## 2018-03-21 PROCEDURE — 96160 PT-FOCUSED HLTH RISK ASSMT: CPT | Mod: S$GLB,,, | Performed by: NURSE PRACTITIONER

## 2018-03-21 PROCEDURE — 99499 UNLISTED E&M SERVICE: CPT | Mod: S$GLB,,, | Performed by: NURSE PRACTITIONER

## 2018-03-21 PROCEDURE — 99999 PR PBB SHADOW E&M-EST. PATIENT-LVL III: CPT | Mod: PBBFAC,,, | Performed by: NURSE PRACTITIONER

## 2018-03-21 NOTE — ASSESSMENT & PLAN NOTE
Stable and controlled on Lipitor  Continue medication as prescribed  Continue current treatment plan as previously prescribed with your PCP

## 2018-03-21 NOTE — ASSESSMENT & PLAN NOTE
Stable and controlled on Amaryl  Continue medication as prescribed  Continue current treatment plan as previously prescribed with your PCP

## 2018-03-21 NOTE — PROGRESS NOTES
"Pablito Givens presented for a  Medicare AWV and comprehensive Health Risk Assessment today. The following components were reviewed and updated:    · Medical history  · Family History  · Social history  · Allergies and Current Medications  · Health Risk Assessment  · Health Maintenance  · Care Team     ** See Completed Assessments for Annual Wellness Visit within the encounter summary.**       The following assessments were completed:  · Living Situation  · CAGE  · Depression Screening  · Timed Get Up and Go  · Whisper Test  · Cognitive Function Screening  · Nutrition Screening  · ADL Screening  · PAQ Screening    Vitals:    03/21/18 1038   BP: (!) 150/93   Pulse: 89   Temp: 98.1 °F (36.7 °C)   TempSrc: Oral   Weight: 102.5 kg (225 lb 15.5 oz)   Height: 6' 1" (1.854 m)     Body mass index is 29.81 kg/m².  Physical Exam   Constitutional: He is oriented to person, place, and time. He appears well-developed and well-nourished.   HENT:   Head: Normocephalic.   Eyes: Pupils are equal, round, and reactive to light.   Cardiovascular: Normal rate, regular rhythm and normal heart sounds.    Pulmonary/Chest: Effort normal and breath sounds normal. No respiratory distress. He has no decreased breath sounds. He has no wheezes. He has no rhonchi. He has no rales.   Musculoskeletal:        Right wrist: He exhibits tenderness.        Feet:    Positive for tenderness to the right wrist radially and in the middle dorsally, right elbow posteriorly and right shoulder posteriorly on palpation.  No erythema or warmth noted   Feet:   Right Foot:   Skin Integrity: Positive for dry skin.   Left Foot:   Skin Integrity: Positive for callus (callus left 3rd toe).   Lymphadenopathy:     He has no cervical adenopathy.   Neurological: He is alert and oriented to person, place, and time.   Skin: Skin is warm and dry. No rash noted.   Psychiatric: He has a normal mood and affect. His behavior is normal. Judgment and thought content normal.   Vitals " reviewed.        Diagnoses and health risks identified today and associated recommendations/orders:    Mixed hyperlipidemia  Stable and controlled on Lipitor  Continue medication as prescribed  Continue current treatment plan as previously prescribed with your PCP      Type 2 diabetes mellitus with stage 3 chronic kidney disease  Stable and controlled on Amaryl  Continue medication as prescribed  Continue current treatment plan as previously prescribed with your PCP      Vitamin D insufficiency  Stable and controlled   Continue medication as prescribed  Continue current treatment plan as previously prescribed with your PCP      Rheumatoid arthritis of multiple sites with negative rheumatoid factor  Stable on methotrexate and prednisone.  Continue treatment as indicated by rheumatology    Idiopathic chronic gout of multiple sites with tophus  Continue medication as prescribed.  Continue treatment as outlined by rheumatology      Patient has been referred to podiatry for care of left 3rd toe callus.  Instructed to continue all follow ups as recommended  Provided Kenney with a 5-10 year written screening schedule and personal prevention plan. Recommendations were developed using the USPSTF age appropriate recommendations. Education, counseling, and referrals were provided as needed. After Visit Summary printed and given to patient which includes a list of additional screenings\tests needed.    No Follow-up on file.    Derrick Mo NP

## 2018-03-23 PROBLEM — E66.9 OBESITY (BMI 30.0-34.9): Status: RESOLVED | Noted: 2018-02-22 | Resolved: 2018-03-23

## 2018-03-23 NOTE — ASSESSMENT & PLAN NOTE
Stable and controlled   Continue medication as prescribed  Continue current treatment plan as previously prescribed with your PCP

## 2018-03-28 ENCOUNTER — HOSPITAL ENCOUNTER (OUTPATIENT)
Dept: RADIOLOGY | Facility: HOSPITAL | Age: 78
Discharge: HOME | End: 2018-03-28
Attending: ORTHOPAEDIC SURGERY
Payer: MEDICARE

## 2018-03-28 ENCOUNTER — OFFICE VISIT (OUTPATIENT)
Dept: ORTHOPEDICS | Facility: CLINIC | Age: 78
End: 2018-03-28
Payer: MEDICARE

## 2018-03-28 VITALS — BODY MASS INDEX: 29.82 KG/M2 | WEIGHT: 225 LBS | HEIGHT: 73 IN

## 2018-03-28 DIAGNOSIS — M19.021 OSTEOARTHROSIS, ELBOW, RIGHT: Primary | ICD-10-CM

## 2018-03-28 PROCEDURE — 73080 X-RAY EXAM OF ELBOW: CPT | Mod: TC,PO,RT

## 2018-03-28 PROCEDURE — 73080 X-RAY EXAM OF ELBOW: CPT | Mod: 26,RT,, | Performed by: RADIOLOGY

## 2018-03-28 PROCEDURE — 20605 DRAIN/INJ JOINT/BURSA W/O US: CPT | Mod: RT,S$GLB,, | Performed by: ORTHOPAEDIC SURGERY

## 2018-03-28 PROCEDURE — 99203 OFFICE O/P NEW LOW 30 MIN: CPT | Mod: 25,S$GLB,, | Performed by: ORTHOPAEDIC SURGERY

## 2018-03-28 PROCEDURE — 99999 PR PBB SHADOW E&M-EST. PATIENT-LVL II: CPT | Mod: PBBFAC,,, | Performed by: ORTHOPAEDIC SURGERY

## 2018-03-28 RX ORDER — TRIAMCINOLONE ACETONIDE 40 MG/ML
40 INJECTION, SUSPENSION INTRA-ARTICULAR; INTRAMUSCULAR
Status: DISCONTINUED | OUTPATIENT
Start: 2018-03-28 | End: 2018-03-28 | Stop reason: HOSPADM

## 2018-03-28 RX ORDER — DICLOFENAC SODIUM 10 MG/G
2 GEL TOPICAL DAILY
Qty: 1 TUBE | Refills: 2 | Status: SHIPPED | OUTPATIENT
Start: 2018-03-28 | End: 2018-08-20 | Stop reason: SDUPTHER

## 2018-03-28 RX ADMIN — TRIAMCINOLONE ACETONIDE 40 MG: 40 INJECTION, SUSPENSION INTRA-ARTICULAR; INTRAMUSCULAR at 10:03

## 2018-03-28 NOTE — LETTER
March 28, 2018      Lilly Foley PA-C  [address] [facility] [address]           [facility]  [address] [address] [facility] [address]  Phone: [phone]          Patient: Pablito Givens Jr.   MR Number: [id number]   YOB: 1940   Date of Visit: 3/28/2018       Dear Lilly Foley:    Thank you for referring Pablito Givens to me for evaluation. Attached you will find relevant portions of my assessment and plan of care.    If you have questions, please do not hesitate to call me. I look forward to following Pablito Givens along with you.    Sincerely,    Layo Hu MD    Enclosure  CC:  No Recipients    If you would like to receive this communication electronically, please contact [email or web address] or [phone] to request more information on [facility] access.    For providers and/or their staff who would like to refer a patient to [facility], please contact us through our one-stop-shop provider referral line, [facility], at [phone].    If you feel you have received this communication in error or would no longer like to receive these types of communications, please e-mail [email or web address]
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
## 2018-03-28 NOTE — PROCEDURES
Intermediate Joint Aspiration/Injection  Date/Time: 3/28/2018 10:13 AM  Performed by: RAUL HANNA  Authorized by: RAUL HANNA     Consent Done?:  Yes (Verbal)  Indications:  Pain  Site marked: The procedure site was marked    Timeout: Prior to procedure the correct patient, procedure, and site was verified      Location:  Elbow  Site:  R elbow  Prep: Patient was prepped and draped in usual sterile fashion    Ultrasonic Guidance for needle placement: No  Needle size:  22 G  Approach:  Posterolateral  Medications:  40 mg triamcinolone acetonide 40 mg/mL  Patient tolerance:  Patient tolerated the procedure well with no immediate complications

## 2018-04-04 ENCOUNTER — TELEPHONE (OUTPATIENT)
Dept: RHEUMATOLOGY | Facility: CLINIC | Age: 78
End: 2018-04-04

## 2018-04-04 NOTE — TELEPHONE ENCOUNTER
Returned pt call pt wanting to know who his physical therapist was. I told him that it was sent internally and we dont know who called him . He said she called him several times but they would get disconnected. I told him to try to answer her call next time she call him pt verbalized udnerstanding.

## 2018-04-04 NOTE — TELEPHONE ENCOUNTER
----- Message from Eleonora Black sent at 4/4/2018  1:04 PM CDT -----  Contact: Pt  Pt would like to speak with the nurse and would like a callback at [phone] or [phone]  He stated that the therapist did not call yet.

## 2018-04-10 DIAGNOSIS — N18.30 TYPE 2 DIABETES MELLITUS WITH STAGE 3 CHRONIC KIDNEY DISEASE, WITHOUT LONG-TERM CURRENT USE OF INSULIN: Chronic | ICD-10-CM

## 2018-04-10 DIAGNOSIS — E11.22 TYPE 2 DIABETES MELLITUS WITH STAGE 3 CHRONIC KIDNEY DISEASE, WITHOUT LONG-TERM CURRENT USE OF INSULIN: Chronic | ICD-10-CM

## 2018-04-10 RX ORDER — GLIMEPIRIDE 1 MG/1
TABLET ORAL
Qty: 90 TABLET | Refills: 0 | Status: SHIPPED | OUTPATIENT
Start: 2018-04-10 | End: 2018-07-08 | Stop reason: SDUPTHER

## 2018-04-16 ENCOUNTER — PATIENT OUTREACH (OUTPATIENT)
Dept: ADMINISTRATIVE | Facility: HOSPITAL | Age: 78
End: 2018-04-16

## 2018-05-10 DIAGNOSIS — M06.09 RHEUMATOID ARTHRITIS OF MULTIPLE SITES WITH NEGATIVE RHEUMATOID FACTOR: ICD-10-CM

## 2018-05-10 RX ORDER — PREDNISONE 5 MG/1
TABLET ORAL
Qty: 45 TABLET | Refills: 3 | Status: SHIPPED | OUTPATIENT
Start: 2018-05-10 | End: 2018-05-24 | Stop reason: SDUPTHER

## 2018-05-10 RX ORDER — FOLIC ACID 1 MG/1
TABLET ORAL
Qty: 30 TABLET | Refills: 3 | Status: SHIPPED | OUTPATIENT
Start: 2018-05-10 | End: 2018-09-10 | Stop reason: SDUPTHER

## 2018-05-17 ENCOUNTER — TELEPHONE (OUTPATIENT)
Dept: INTERNAL MEDICINE | Facility: CLINIC | Age: 78
End: 2018-05-17

## 2018-05-17 NOTE — TELEPHONE ENCOUNTER
Returned call to pt, stated that he received a phone call from Dr. Eller however I could not find any documentation as to where we may have called him. Advised that I will send a message to Dr. Eller to see if she may have contacted him personally and call him back with a response. DIRK

## 2018-05-17 NOTE — TELEPHONE ENCOUNTER
----- Message from Christine Banuelos sent at 5/17/2018  9:09 AM CDT -----  Contact: self/[phone]  Returning call, please call back at [phone].Thanks/ar

## 2018-05-24 DIAGNOSIS — M06.09 RHEUMATOID ARTHRITIS OF MULTIPLE SITES WITH NEGATIVE RHEUMATOID FACTOR: ICD-10-CM

## 2018-05-24 RX ORDER — PREDNISONE 5 MG/1
5 TABLET ORAL DAILY
Qty: 90 TABLET | Refills: 1 | Status: SHIPPED | OUTPATIENT
Start: 2018-05-24 | End: 2018-11-18 | Stop reason: SDUPTHER

## 2018-05-29 ENCOUNTER — OFFICE VISIT (OUTPATIENT)
Dept: PODIATRY | Facility: CLINIC | Age: 78
End: 2018-05-29
Payer: MEDICARE

## 2018-05-29 VITALS
BODY MASS INDEX: 29.95 KG/M2 | HEART RATE: 88 BPM | WEIGHT: 226 LBS | HEIGHT: 73 IN | DIASTOLIC BLOOD PRESSURE: 70 MMHG | SYSTOLIC BLOOD PRESSURE: 122 MMHG

## 2018-05-29 DIAGNOSIS — B35.1 DERMATOPHYTOSIS OF NAIL: ICD-10-CM

## 2018-05-29 DIAGNOSIS — E11.49 TYPE II DIABETES MELLITUS WITH NEUROLOGICAL MANIFESTATIONS: ICD-10-CM

## 2018-05-29 DIAGNOSIS — G62.9 NEUROPATHY: ICD-10-CM

## 2018-05-29 DIAGNOSIS — E11.9 COMPREHENSIVE DIABETIC FOOT EXAMINATION, TYPE 2 DM, ENCOUNTER FOR: Primary | ICD-10-CM

## 2018-05-29 PROCEDURE — 11721 DEBRIDE NAIL 6 OR MORE: CPT | Mod: Q9,S$GLB,, | Performed by: PODIATRIST

## 2018-05-29 PROCEDURE — 99999 PR PBB SHADOW E&M-EST. PATIENT-LVL III: CPT | Mod: PBBFAC,,, | Performed by: PODIATRIST

## 2018-05-29 PROCEDURE — 99499 UNLISTED E&M SERVICE: CPT | Mod: HCNC,S$GLB,, | Performed by: PODIATRIST

## 2018-05-29 PROCEDURE — 99214 OFFICE O/P EST MOD 30 MIN: CPT | Mod: 25,S$GLB,, | Performed by: PODIATRIST

## 2018-05-29 PROCEDURE — 3078F DIAST BP <80 MM HG: CPT | Mod: CPTII,S$GLB,, | Performed by: PODIATRIST

## 2018-05-29 PROCEDURE — 3074F SYST BP LT 130 MM HG: CPT | Mod: CPTII,S$GLB,, | Performed by: PODIATRIST
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
## 2018-06-03 DIAGNOSIS — F51.04 CHRONIC INSOMNIA: ICD-10-CM

## 2018-06-03 RX ORDER — ZOLPIDEM TARTRATE 5 MG/1
TABLET ORAL
Qty: 90 TABLET | Refills: 0 | Status: SHIPPED | OUTPATIENT
Start: 2018-06-03 | End: 2018-08-28 | Stop reason: SDUPTHER

## 2018-06-29 ENCOUNTER — OFFICE VISIT (OUTPATIENT)
Dept: NEUROLOGY | Facility: CLINIC | Age: 78
End: 2018-06-29
Payer: MEDICARE

## 2018-06-29 VITALS
BODY MASS INDEX: 29.98 KG/M2 | HEIGHT: 73 IN | DIASTOLIC BLOOD PRESSURE: 80 MMHG | SYSTOLIC BLOOD PRESSURE: 160 MMHG | WEIGHT: 226.19 LBS | HEART RATE: 84 BPM

## 2018-06-29 DIAGNOSIS — G20.A1 PARKINSON DISEASE, SYMPTOMATIC: ICD-10-CM

## 2018-06-29 DIAGNOSIS — G56.21 ULNAR NEUROPATHY AT ELBOW OF RIGHT UPPER EXTREMITY: ICD-10-CM

## 2018-06-29 PROCEDURE — 3079F DIAST BP 80-89 MM HG: CPT | Mod: CPTII,S$GLB,, | Performed by: PSYCHIATRY & NEUROLOGY

## 2018-06-29 PROCEDURE — 3077F SYST BP >= 140 MM HG: CPT | Mod: CPTII,S$GLB,, | Performed by: PSYCHIATRY & NEUROLOGY

## 2018-06-29 PROCEDURE — 99999 PR PBB SHADOW E&M-EST. PATIENT-LVL III: CPT | Mod: PBBFAC,,, | Performed by: PSYCHIATRY & NEUROLOGY

## 2018-06-29 PROCEDURE — 99204 OFFICE O/P NEW MOD 45 MIN: CPT | Mod: S$GLB,,, | Performed by: PSYCHIATRY & NEUROLOGY

## 2018-06-29 PROCEDURE — 99499 UNLISTED E&M SERVICE: CPT | Mod: HCNC,S$GLB,, | Performed by: PSYCHIATRY & NEUROLOGY

## 2018-06-29 NOTE — PROGRESS NOTES
This 78-year-old right-handed patient indicates that he has been aware of a tremor in his right hand for the past 5 or 6 months.  In addition to the tremor in the right hand, he has also been aware of a tremor in his chin.  The patient is not aware of any significant gait disturbance other than that caused by his having had bilateral total knee arthroplasty.  He is not certain is his walking motion is slower or more awkward than it has been in the past.  He denies any difficulty with bed mobility.  He is not aware of any difficulty with chewing or swallowing.  He is not aware of any difficulty with articulation or speech volume.    In addition to his tremor, however he is also aware of weakness in the right hand and forearm.  The patient states for example, he finds it difficult to grasp and hold objects with the right hand as compared to that on the left.  He has a sensation of numbness that extends into the 4th and 5th fingers of the right hand.  He is not aware of any pain around the right elbow.  He indicates however that he has had a prior injury to the right elbow and that he has arthritis.  The patient has also had mild neck discomfort and stiffness but the pain does not radiate into the arm or hand.  The patient denies any radiating pain with coughing or sneezing.  He is not aware of any limitation of neck motion.      ROS:  GENERAL: No fever, chills, or weight loss.  SKIN: No rashes, itching or changes in color or texture of skin.  HEAD: No headaches or recent head trauma.  EYES: Visual acuity fine. No photophobia, ocular pain or diplopia.  EARS: Denies ear pain, discharge   NOSE: No loss of smell, no epistaxis or postnasal drip.  MOUTH & THROAT: No hoarseness or change in voice. No excessive gum bleeding.  NODES: Denies swollen glands.  CHEST: Denies HDZ, cyanosis, wheezing, cough and sputum production.  CARDIOVASCULAR: Denies chest pain, PND, orthopnea or reduced exercise tolerance.  ABDOMEN: Appetite  fine. No weight loss. Denies diarrhea, abdominal pain, hematemesis or blood in stool.  URINARY: No flank pain, dysuria or hematuria.  PERIPHERAL VASCULAR: No claudication or cyanosis.  MUSCULOSKELETAL:  The patient is aware of generalized joint stiffness which is worse in the morning but eases as the day goes by.  NEUROLOGIC: No history of seizures, paralysis, alteration of gait or coordination.    Past history:  Surgery:  Hernia repair, bilateral total knee arthroplasty, left upper lobectomy, chest tube insertion, cataract extraction with lens implant left nephrectomy for cancer  Medical:  Diabetes mellitus, gout, hypertension, osteoarthritis, history of kidney cancer, cataracts  Allergies:  Am while the PN, Demerol, oxycodone, opioids, codeine, allopurinol    Family history:  The patient's father  at age 54 of an acute MI.  His mother  age 76 of unknown causes.  There is a history of diabetes in his brother and a history of hypertension in his sister.  There is no known family history of tremor.    Social history:  The patient is retired.  He is  and lives with his wife.  He is a former smoker who smoked 1/2 packs a day for least 40 years and quit in .  The patient has been physically active all of his adult life.    PE:   VITAL SIGNS:  Blood pressure 160/80, pulse 84, weight 102.6 kg, height 73 in, BMI 29.84  APPEARANCE: Well nourished, well developed, in no acute distress.    HEAD: Normocephalic, atraumatic.  Obvious chin tremor is seen.  EYES: PERRL. EOMI.  Non-icteric sclerae.    EARS: TM's intact. Light reflex normal. No retraction or perforation.    NOSE: Mucosa pink. Airway clear.  MOUTH & THROAT: No tonsillar enlargement. No pharyngeal erythema or exudate. No stridor.  NECK: Supple. No bruits.  CHEST: Lungs clear to auscultation.  CARDIOVASCULAR: Regular rhythm without significant murmurs.  ABDOMEN: Bowel sounds normal. Not distended. Soft. No tenderness or masses.  MUSCULOSKELETAL:   There is obvious atrophy in the right hand involving the 1st dorsal interosseous and abductor digiti minimi.  He has changes of joint enlargement of the interphalangeal joints in both hands. He has a negative Tinel sign over the ulnar nerve bilaterally. He denies any pain to palpation over the ulnar groove.  He has a full range of motion of the neck including midline flexion/extension as well as rotation and lateral flexion.  NEUROLOGIC:   Mental Status:  The patient is well oriented to person, time, place, and situation.  The patient is attentive to the environment and cooperative for the exam.  Cranial Nerves: II-XII grossly intact. Fundoscopic exam is normal.  No hemorrhage, exudate or papilledema is present. The extraocular muscles are intact in the cardinal directions of gaze.  No ptosis is present. Facial features are symmetrical.  Speech is normal in fluency, diction, and phrasing.  Tongue protrudes in the midline.    Gait and Station:  Romberg is negative.  Good alternate armswing with normal gait.  Motor:  No downdrift of either arm when held at shoulder level.  Manual muscle testing of proximal and distal muscles of both upper and lower extremities does demonstrate significant weakness of the dorsal interosseous and abductor digiti minimi muscle of the right hand.   strength is diminished in the right hand compared to that on the left as well.  Sensory:  The patient reports diminished appreciation of pinprick over the right 5th finger and medial 1/2 of the 4th finger.  Cerebellar:  Finger to nose done well.  The patient clearly has a resting tremor in the right hand that is a slow type of tremor.  The tremor is accentuated during ambulation.  He has some difficulty with finger tapping and alternating movements of the right hand compared to that on the left.  Reflexes:  Stretch reflexes are 1+ both upper and lower extremities.  Plantar stimulation is flexor bilaterally and no pathological reflexes are  seen      Assessment:  1.  Parkinson's disease, symptomatic  2.  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  History of gout    Recommendations:  1.  Nerve conduction study of right upper extremity for possible ulnar neuropathy versus diabetic polyneuropathy  2.  Treatment of Parkinson's symptoms were discussed but medication was not prescribed at this time.  The patient is of the opinion that the tremor does not interfere with his activities of daily living and he does not show significant signs of Parkinson's rigidity or bradykinesia at this time.  Since tremor is a very difficult Parkinson's symptoms to treat, I would prefer dealing with this ulnar neuropathy 1st and then deciding if medication is going to be utilized for his Parkinson's disease.    This was a 55 min visit with the patient with over 50% time spent counseling the patient regarding the findings on examination and discussion diagnosis and treatment ulnar neuropathy and Parkinson's disease.      This note is generated with speech recognition software and is subject to transcription error and sound alike phrases that may be missed by proofreading.

## 2018-06-29 NOTE — PATIENT INSTRUCTIONS
Common Symptoms of Parkinson Disease  You have Parkinson disease. This disease is caused by a loss of a chemical in your brain needed to help control movement and balance. For reasons that are not clear, cells that make this brain chemical stop working. This causes symptoms. This sheet tells you more about symptoms of Parkinson disease.    How symptoms may affect you  Parkinson disease symptoms vary for each person. You may have many severe symptoms. Or you may have only a few mild ones. Your symptoms may involve only one side of your body. Or they may involve both sides of your body. Also, your symptoms may change over time. And you may have different symptoms at different stages. Your symptoms may also get worse as your disease progresses.  Symptoms that affect movement and balance  These can include:  · Tremor (shaking). This is a very common symptom. Most often, a hand or arm shakes on one or both sides of the body. Tremor may also affect other areas of the body, such as a leg, a foot, or the chin. Shaking may lessen when the affected part is used. It may worsen when at rest.  · Rigidity. This refers to having stiff or tight muscles. This happens because the muscles dont get the signal to relax. Rigidity may cause muscle pain and cramping. It may also cause a stooped posture.  · Problems with balance. This can affect how well you stand and move. This can also increase your risk of falls.  Other symptoms  Other symptoms of Parkinson disease include speaking too softly and in a monotone, writing that gets shaky and smaller across the page, and trouble swallowing. They also include constipation, oily skin, and changes in blood pressure. Memory loss and other problems with thinking can occur later in the disease progression. Bradykinesia--or slow movement--can also occur. This can cause problems with actions such as getting out of chairs and beds. Walking may be limited to short, shuffling steps. You may feel  ""frozen," or unable to move. Blinking, facial expressions, swinging of your arms when walking, and other "unconscious movements" are also slowed down. Some people may have problems with their urination and others may also feel depressed.  Date Last Reviewed: 9/26/2015  © 0890-5213 [employer]. [address] [place] [address], [place] [address]. All rights reserved. This information is not intended as a substitute for professional medical care. Always follow your healthcare professional's instructions.        Understanding Parkinsons Disease    Parkinsons disease is a condition that affects control over your movements. Its caused by a lack of dopamine, a chemical that helps the nerve cells in your brain communicate with each other. When dopamine is missing from certain areas of the brain, the messages that tell your body how to move are lost or distorted. This can lead to symptoms such as shaking, stiffness, and slow movement. Theres no cure for Parkinsons disease. But proper treatment can help ease symptoms and allow you to live a full, active life.  Changes in the brain  Dopamine is produced in a small area of the brain called the substantia nigra. For reasons that arent yet clear, the nerve cells in this region that make dopamine begin to die. This means less dopamine is available to help control your movements. When healthy, the substantia nigra makes enough dopamine to help control your bodys movements.  Symptoms of Parkinsons disease  Parkinsons symptoms often appear gradually. Some may take years to develop. Others you may not have at all. Below are the most common:  · Shaking (resting tremor) can affect the hands, arms, and legs. Most often, the shaking is worse on one side of the body. It usually lessens when the limb is used.  · Slow movement (bradykinesia) can affect the whole body. People may walk with short, shuffling steps. They can also feel frozen and unable to move.  · Stiffness " (rigidity) occurs when muscles dont relax. It can cause muscle aches and stooped posture.  · Other symptoms include balance problems, small handwriting, soft voice volume, constipation, reduced or flat facial expression, and sleep problems. Memory loss or other problems with thinking may also occur later in the progression of the disease.  How is Parkinsons diagnosed?  There is no single test for Parkinsons disease. The diagnosis is based on your symptoms, medical history, and a physical exam. You may also have tests to help rule out other problems. These may include blood tests to look for diseases that cause similar symptoms. They can also include brain-imaging tests, such as an MRI of the brain     Parkinsons disease or Parkinsonism?  Parkinsonism is the name for a group of brain conditions that all have symptoms similar to Parkinsons disease. However, the causes of these symptoms are different. In some cases, Parkinson-type symptoms may result from strokes or head injury. They can also be caused by medicines or other diseases that affect the brain. In general, these conditions cannot be treated as well using the medicines that help people with Parkinsons disease.   Date Last Reviewed: 9/26/2015 © 2000-2017 [employer]. [address] [place] [address], [place] [address]. All rights reserved. This information is not intended as a substitute for professional medical care. Always follow your healthcare professional's instructions.

## 2018-06-29 NOTE — LETTER
June 29, 2018      Daily Eller MD  [address] [facility] [address]           [facility]  [address] [place] [address]  Phone: [phone]  Fax: [phone]          Patient: Pablito Givens Jr.   MR Number: [id number]   YOB: 1940   Date of Visit: 6/29/2018       Dear Dr. Daily Eller:    Thank you for referring Pablito Givens to me for evaluation. Attached you will find relevant portions of my assessment and plan of care.    If you have questions, please do not hesitate to call me. I look forward to following Pablito Givens along with you.    Sincerely,    Huber Askew MD    Enclosure  CC:  No Recipients    If you would like to receive this communication electronically, please contact [email or web address] or [phone] to request more information on [employer] Link access.    For providers and/or their staff who would like to refer a patient to [facility], please contact us through our one-stop-shop provider referral line, [facility], at [phone].    If you feel you have received this communication in error or would no longer like to receive these types of communications, please e-mail [email or web address]

## 2018-07-03 ENCOUNTER — OFFICE VISIT (OUTPATIENT)
Dept: PHYSICAL MEDICINE AND REHAB | Facility: CLINIC | Age: 78
End: 2018-07-03
Payer: MEDICARE

## 2018-07-03 VITALS
SYSTOLIC BLOOD PRESSURE: 145 MMHG | HEIGHT: 73 IN | WEIGHT: 226 LBS | BODY MASS INDEX: 29.95 KG/M2 | RESPIRATION RATE: 14 BRPM | HEART RATE: 94 BPM | DIASTOLIC BLOOD PRESSURE: 82 MMHG

## 2018-07-03 DIAGNOSIS — G56.21 CUBITAL TUNNEL SYNDROME ON RIGHT: Primary | ICD-10-CM

## 2018-07-03 DIAGNOSIS — G56.22 CUBITAL TUNNEL SYNDROME ON LEFT: ICD-10-CM

## 2018-07-03 PROBLEM — E11.9 DIABETES MELLITUS WITHOUT COMPLICATION: Status: ACTIVE | Noted: 2017-10-12

## 2018-07-03 PROCEDURE — 3079F DIAST BP 80-89 MM HG: CPT | Mod: CPTII,S$GLB,, | Performed by: PHYSICAL MEDICINE & REHABILITATION

## 2018-07-03 PROCEDURE — 99204 OFFICE O/P NEW MOD 45 MIN: CPT | Mod: 25,S$GLB,, | Performed by: PHYSICAL MEDICINE & REHABILITATION

## 2018-07-03 PROCEDURE — 3077F SYST BP >= 140 MM HG: CPT | Mod: CPTII,S$GLB,, | Performed by: PHYSICAL MEDICINE & REHABILITATION

## 2018-07-03 PROCEDURE — 99999 PR PBB SHADOW E&M-EST. PATIENT-LVL III: CPT | Mod: PBBFAC,,, | Performed by: PHYSICAL MEDICINE & REHABILITATION

## 2018-07-03 PROCEDURE — 95911 NRV CNDJ TEST 9-10 STUDIES: CPT | Mod: S$GLB,,, | Performed by: PHYSICAL MEDICINE & REHABILITATION

## 2018-07-03 NOTE — LETTER
July 3, 2018      Huber Askew MD  [address] [facility] [place] [address]           [facility] Physiatry  [address] [facility] [place] [address]  Phone: [phone]  Fax: [phone]          Patient: Pablito Givens Jr.   MR Number: [id number]   YOB: 1940   Date of Visit: 7/3/2018       Dear Dr. Huber Askew:    Thank you for referring Pablito Givens to me for evaluation. Attached you will find relevant portions of my assessment and plan of care.    If you have questions, please do not hesitate to call me. I look forward to following Pablito Givens along with you.    Sincerely,    Ashlie Gomez MD    Enclosure  CC:  No Recipients    If you would like to receive this communication electronically, please contact [email or web address] or [phone] to request more information on [employer] Link access.    For providers and/or their staff who would like to refer a patient to [facility], please contact us through our one-stop-shop provider referral line, [facility], at [phone].    If you feel you have received this communication in error or would no longer like to receive these types of communications, please e-mail [email or web address]

## 2018-07-03 NOTE — PROGRESS NOTES
[facility] [address] [place] [address]  Phone: [phone]          Full Name: nyasia blackmon YOB: 1940  Patient ID: [id number]      Visit Date: 7/3/2018 13:21  Age: 78 Years 3 Months Old  Examining Physician: Ashlie Gomez M.D.  Referring Physician:   Reason for Referral: ue pain      Chief Complaint   Patient presents with    Elbow Pain     right        HPI: This is a 78 y.o.  male being seen in clinic today for evaluation of chronic bilateral elbow achy pain and numbness-worse on the right.  With increased activity, he has achy pain at his elbow and numbness in his hand. Rest provides some relief.     History obtained from patient    Past family, medical, social, and surgical history reviewed in chart    Review of Systems:     General- denies lethargy, weight change, fever, chills  Head/neck- denies swallowing difficulties  ENT- denies hearing changes  Cardiovascular-denies chest pain  Pulmonary- denies shortness of breath  GI- denies constipation or bowel incontinence  - denies bladder incontinence  Skin- denies wounds or rashes  Musculoskeletal- denies weakness, denies pain  Neurologic- denies numbness and tingling  Psychiatric- denies depressive or psychotic features, denies anxiety  Lymphatic-denies swelling  Endocrine- denies hypoglycemic symptoms/DM history  All other pertinent systems negative     Physical Examination:  General: Well developed, well nourished male, NAD, +tremor  HEENT:NCAT EOMI bilaterally   Pulmonary:Normal respirations    Spinal Examination: CERVICAL  Active ROM is within normal limits.  Inspection: No deformity of spinal alignment.  Palpation: No vertebral tenderness to percussion.      Spinal Examination: LUMBAR or THORACIC  Active ROM is within normal limits.  Inspection: No deformity of spinal alignment.      Musculoskeletal Tests:  Phalen:   Elbow compression (ulnar): + bilaterally  Tinels at wrist:     Bilateral Upper and  Lower Extremities:  Pulses are 2+ at radial bilaterally.  Shoulder/Elbow/Wrist/Hand ROM wnl  Hip/Knee/Ankle ROM   Bilateral Extremities show normal capillary refill.  No signs of cyanosis, rubor, edema, skin changes, or dysvascular changes of appendages.  Nails appear intact.    Neurological Exam:  Cranial Nerves:  II-XII grossly intact    Manual Muscle Testing: (Motor 5=normal)  5/5 strength bilateral upper extremities except 4+/5 interossei on right    No focal atrophy is noted of either upper  extremity.    Bilateral Reflexes:hypo at bic tric br  Newman's response is absent bilaterally.    Sensation: tested to light touch  - intact in arms except dec at right 5th digit  Gait: Narrow base and good arm swing.      Entire procedure explained to patient prior to proceeding.  Verbal consent obtained    SNC      Nerve / Sites Rec. Site Onset Lat Peak Lat Amp Segments Distance Velocity     ms ms µV  mm m/s   R Median - Digit II (Antidromic)      Wrist Dig II 2.9 3.8 10.3 Wrist - Dig  48   L Median - Digit II (Antidromic)      Wrist Dig II 2.8 3.5 11.2 Wrist - Dig  50   R Ulnar - Digit V (Antidromic)      Wrist Dig V 2.7 3.2 12.3 Wrist - Dig V 140 53   L Ulnar - Digit V (Antidromic)      Wrist Dig V 2.6 3.3 10.0 Wrist - Dig V 140 55   R Radial - Anatomical snuff box (Forearm)      Forearm Wrist 1.9 2.7 9.3 Forearm - Wrist 100 52   L Radial - Anatomical snuff box (Forearm)      Forearm Wrist 1.7 2.6 16.2 Forearm - Wrist 100 60       MNC      Nerve / Sites Muscle Latency Amplitude Duration Rel Amp Segments Distance Lat Diff Velocity     ms mV ms %  mm ms m/s   R Median - APB      Wrist APB 4.0 4.9 6.8 100 Wrist - APB 80        Elbow APB 8.6 3.1 6.9 78.4 Elbow - Wrist 240 4.6 52   L Median - APB      Wrist APB 3.4 7.3 5.9 100 Wrist - APB 80        Elbow APB 7.7 6.7 6.2 91.4 Elbow - Wrist 260 4.3 60   R Ulnar - ADM      Wrist ADM 3.2 5.3 5.7 100 Wrist - ADM 80        B.Elbow ADM 8.4 4.7 6.1 88.2 B.Elbow - Wrist  260 5.2 50      A.Elbow ADM 11.1 4.6 6.5 97.2 A.Elbow - B.Elbow 100 2.7 37         A.Elbow - Wrist  7.9    L Ulnar - ADM      Wrist ADM 2.9 8.0 5.7 100 Wrist - ADM 80        B.Elbow ADM 7.9 7.2 6.4 89.9 B.Elbow - Wrist 260 5.0 52      A.Elbow ADM 9.8 6.5 7.1 90.6 A.Elbow - B.Elbow 100 1.9 52         A.Elbow - Wrist  6.9                                   INTERPRETATION  -Bilateral median motor nerve conduction study showed normal latency, amplitude, and conduction velocity  -Bilateral median sensory nerve conduction study showed normal peak latency and amplitude  -Bilateral ulnar motor nerve conduction study showed normal latency, borderline dec amplitude on the right, and dec conduction velocity across the right elbow  -Bilateral ulnar sensory nerve conduction study showed normal peak latency and amplitude  -Bilateral radial sensory nerve conduction study showed normal peak latency and amplitude      IMPRESSION  1. ABNORMAL study  2. 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 Follow up with referring provider: Dr. Huber Askew  2. Handouts on Cubital tunnel provided. Rec strengthening of hand and body mechanics to avoid nerve compression. Possible OT referral or ortho for transposition eval if not improving  3. This study is good for one year. If symptoms worsen or do not improve, please re-consult.    Ashlie Gomez M.D.  Physical Medicine and Rehab

## 2018-07-03 NOTE — PATIENT INSTRUCTIONS
What Is Cubital Tunnel Syndrome?  Cubital tunnel syndrome is a set of symptoms that may occur if the ulnar nerve in your elbow gets pinched. This may happen if you bend or lean on your elbows often.    Your cubital tunnel  The cubital tunnel is a groove in a bone near your elbow. This narrow groove provides a passage for the ulnar nerve, one of the main nerves in your arm. The ulnar nerve can cause funny bone pain if your elbow gets bumped. Your cubital tunnel helps protect this nerve as it passes through your elbow and down to your fingers.  Compressing the ulnar nerve  Bending your elbow compresses the ulnar nerve inside the cubital tunnel. The nerve can get inflamed (irritated) after constant bending and pinching or after getting hurt. Over time, this can lead to pain or numbness. The pain is often felt in your ring fingers and little fingers.     Bending your elbow as you hold a phone can cause problems over time.    What are its symptoms?  · Numbness or tingling in the ring fingers and little fingers  · Loss of finger or hand strength  · Inability to straighten fingers  · Sharp, sudden pain when elbow is touched  The road to healing  You can keep cubital tunnel syndrome from flaring up. Avoid pinching the ulnar nerve by keeping your arm straight as much as you can, even while sleeping. And use phone headsets and elbow pads. If you still have pain, tell your doctor.   Date Last Reviewed: 9/8/2015  © 6441-1473 [employer]. [address] [place] [address], [place] [address]. All rights reserved. This information is not intended as a substitute for professional medical care. Always follow your healthcare professional's instructions.        Ulnar Nerve Palsy  The ulnar nerve travels down the arm from the shoulder to the hand. It controls movement and feeling in the wrist and hand. Damage to this nerve can cause a condition called ulnar nerve palsy.  A common cause of ulnar nerve palsy is leaning on the  "elbow for long periods of time. Injury to the arm or elbow, such as a fracture, can also damage the ulnar nerve.  Symptoms of ulnar nerve palsy include:  · Numbness, tingling, or burning (often described as "pins and needles")  · Pain  · Weakness in the hand and fingers  The treatment depends on the cause of the nerve damage. In some cases, the problem will go away on its once pressure to the nerve is relieved. Splinting the wrist to limit movement may help with healing. If the problem is due to trauma or injury, physical therapy may be prescribed. Corticosteroids injections into the area may reduce swelling and pressure on the nerve. Surgery to repair the nerve may be needed for chronic symptoms that do not respond to simpler treatments.  Home care  · Rest the wrist and arm until normal feeling and strength return.  · If you were given a splint or sling, wear it as directed.  · Avoid positions leaning on your elbows.  · If medicines were prescribed for pain or nerve sensations, take them as directed.  Follow-up care  Follow up with your healthcare provider or as advised by our staff.  When to seek medical advice  Call your healthcare provider right away if you have any of the following:  · Increasing arm swelling or pain  · Numbness or weakness of the arm  · Symptoms spread to other parts of the body  · Slurred speech, confusion  · Trouble speaking, walking, or seeing  Date Last Reviewed: 9/25/2015 © 2000-2017 [employer]. [address] [place] [address]. All rights reserved. This information is not intended as a substitute for professional medical care. Always follow your healthcare professional's instructions.        "

## 2018-07-06 NOTE — PROGRESS NOTES
Patient states just pick one in [place]. So I choose [facility] unless you refer to another. Thanks

## 2018-07-08 DIAGNOSIS — E11.22 TYPE 2 DIABETES MELLITUS WITH STAGE 3 CHRONIC KIDNEY DISEASE, WITHOUT LONG-TERM CURRENT USE OF INSULIN: Chronic | ICD-10-CM

## 2018-07-08 DIAGNOSIS — N18.30 TYPE 2 DIABETES MELLITUS WITH STAGE 3 CHRONIC KIDNEY DISEASE, WITHOUT LONG-TERM CURRENT USE OF INSULIN: Chronic | ICD-10-CM

## 2018-07-08 RX ORDER — GLIMEPIRIDE 1 MG/1
TABLET ORAL
Qty: 90 TABLET | Refills: 0 | Status: SHIPPED | OUTPATIENT
Start: 2018-07-08 | End: 2018-10-07 | Stop reason: SDUPTHER

## 2018-07-09 ENCOUNTER — TELEPHONE (OUTPATIENT)
Dept: NEUROLOGY | Facility: CLINIC | Age: 78
End: 2018-07-09

## 2018-07-09 ENCOUNTER — TELEPHONE (OUTPATIENT)
Dept: ORTHOPEDICS | Facility: CLINIC | Age: 78
End: 2018-07-09

## 2018-07-09 DIAGNOSIS — G20.A1 PARKINSON DISEASE, SYMPTOMATIC: Primary | ICD-10-CM

## 2018-07-09 PROBLEM — G56.22 CUBITAL TUNNEL SYNDROME ON LEFT: Status: ACTIVE | Noted: 2018-07-09

## 2018-07-09 NOTE — TELEPHONE ENCOUNTER
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

## 2018-07-09 NOTE — TELEPHONE ENCOUNTER
----- Message from Deepti Conteh MA sent at 7/9/2018 12:14 PM CDT -----  Received referral for pt to be seen for cubital tunnel. Pt states he just wants referral to PT and not an appointment. Please place PT order for pt

## 2018-07-09 NOTE — TELEPHONE ENCOUNTER
Called pt to schedule from referral sent in workqueue. Pt states he does not want an appointment he wanted physical therapy. Vocalized understanding. Sent message to referring providers staff.

## 2018-07-18 DIAGNOSIS — M1A.09X1 IDIOPATHIC CHRONIC GOUT OF MULTIPLE SITES WITH TOPHUS: ICD-10-CM

## 2018-07-18 DIAGNOSIS — M06.09 RHEUMATOID ARTHRITIS OF MULTIPLE SITES WITH NEGATIVE RHEUMATOID FACTOR: ICD-10-CM

## 2018-07-19 RX ORDER — TRAMADOL HYDROCHLORIDE 50 MG/1
TABLET ORAL
Qty: 30 TABLET | Refills: 0 | Status: SHIPPED | OUTPATIENT
Start: 2018-07-19 | End: 2018-11-08 | Stop reason: SDUPTHER
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
## 2018-07-20 ENCOUNTER — OFFICE VISIT (OUTPATIENT)
Dept: ORTHOPEDICS | Facility: CLINIC | Age: 78
End: 2018-07-20
Payer: MEDICARE

## 2018-07-20 VITALS
HEART RATE: 77 BPM | WEIGHT: 226 LBS | HEIGHT: 73 IN | BODY MASS INDEX: 29.95 KG/M2 | DIASTOLIC BLOOD PRESSURE: 81 MMHG | SYSTOLIC BLOOD PRESSURE: 138 MMHG | RESPIRATION RATE: 18 BRPM

## 2018-07-20 DIAGNOSIS — M25.521 RIGHT ELBOW PAIN: Primary | ICD-10-CM

## 2018-07-20 DIAGNOSIS — G56.21 CUBITAL TUNNEL SYNDROME ON RIGHT: ICD-10-CM

## 2018-07-20 PROCEDURE — 3079F DIAST BP 80-89 MM HG: CPT | Mod: CPTII,S$GLB,, | Performed by: PHYSICIAN ASSISTANT

## 2018-07-20 PROCEDURE — 99213 OFFICE O/P EST LOW 20 MIN: CPT | Mod: S$GLB,,, | Performed by: PHYSICIAN ASSISTANT

## 2018-07-20 PROCEDURE — 3075F SYST BP GE 130 - 139MM HG: CPT | Mod: CPTII,S$GLB,, | Performed by: PHYSICIAN ASSISTANT

## 2018-07-20 PROCEDURE — 99999 PR PBB SHADOW E&M-EST. PATIENT-LVL IV: CPT | Mod: PBBFAC,,, | Performed by: PHYSICIAN ASSISTANT

## 2018-07-20 NOTE — LETTER
July 26, 2018      Huber Askew MD  [address] [facility] [address]           [facility]  [address] [place] [id number]  Phone: [phone]  Fax: [phone]          Patient: Pablito Givens Jr.   MR Number: [id number]   YOB: 1940   Date of Visit: 7/20/2018       Dear Dr. Huber Askew:    Thank you for referring Pablito Givens to me for evaluation. Attached you will find relevant portions of my assessment and plan of care.    If you have questions, please do not hesitate to call me. I look forward to following Pablito Givens along with you.    Sincerely,    Dann Ozuna PA-C    Enclosure  CC:  No Recipients    If you would like to receive this communication electronically, please contact [email or web address] or [phone] to request more information on [employer] Link access.    For providers and/or their staff who would like to refer a patient to [facility], please contact us through our one-stop-shop provider referral line, [facility], at [phone].    If you feel you have received this communication in error or would no longer like to receive these types of communications, please e-mail [email or web address]

## 2018-07-25 DIAGNOSIS — M06.09 RHEUMATOID ARTHRITIS OF MULTIPLE SITES WITH NEGATIVE RHEUMATOID FACTOR: ICD-10-CM

## 2018-07-25 RX ORDER — METHOTREXATE 2.5 MG/1
TABLET ORAL
Qty: 30 TABLET | Refills: 2 | Status: SHIPPED | OUTPATIENT
Start: 2018-07-25 | End: 2018-11-20 | Stop reason: SDUPTHER

## 2018-08-08 ENCOUNTER — PATIENT OUTREACH (OUTPATIENT)
Dept: ADMINISTRATIVE | Facility: HOSPITAL | Age: 78
End: 2018-08-08

## 2018-08-20 DIAGNOSIS — F51.04 CHRONIC INSOMNIA: ICD-10-CM

## 2018-08-20 RX ORDER — DICLOFENAC SODIUM 10 MG/G
GEL TOPICAL
Qty: 1 TUBE | Refills: 2 | Status: SHIPPED | OUTPATIENT
Start: 2018-08-20

## 2018-08-20 RX ORDER — ZOLPIDEM TARTRATE 5 MG/1
TABLET ORAL
Qty: 90 TABLET | Refills: 0 | OUTPATIENT
Start: 2018-08-20

## 2018-08-21 ENCOUNTER — OFFICE VISIT (OUTPATIENT)
Dept: NEUROLOGY | Facility: CLINIC | Age: 78
End: 2018-08-21
Payer: MEDICARE

## 2018-08-21 VITALS
HEIGHT: 73 IN | WEIGHT: 224.44 LBS | BODY MASS INDEX: 29.74 KG/M2 | SYSTOLIC BLOOD PRESSURE: 118 MMHG | DIASTOLIC BLOOD PRESSURE: 60 MMHG | HEART RATE: 78 BPM

## 2018-08-21 DIAGNOSIS — G20.A1 PARKINSON DISEASE, SYMPTOMATIC: Primary | ICD-10-CM

## 2018-08-21 PROCEDURE — 99999 PR PBB SHADOW E&M-EST. PATIENT-LVL III: CPT | Mod: PBBFAC,,, | Performed by: PSYCHIATRY & NEUROLOGY

## 2018-08-21 PROCEDURE — 99214 OFFICE O/P EST MOD 30 MIN: CPT | Mod: S$GLB,,, | Performed by: PSYCHIATRY & NEUROLOGY

## 2018-08-21 PROCEDURE — 3078F DIAST BP <80 MM HG: CPT | Mod: CPTII,S$GLB,, | Performed by: PSYCHIATRY & NEUROLOGY

## 2018-08-21 PROCEDURE — 3074F SYST BP LT 130 MM HG: CPT | Mod: CPTII,S$GLB,, | Performed by: PSYCHIATRY & NEUROLOGY

## 2018-08-21 NOTE — PROGRESS NOTES
This is a 78-year-old right-handed patient who has a history consistent with Parkinson's disease.  The patient had been referred to physical therapy which he states has been somewhat strenuous for him and has caused him some difficulties.  The patient states that for example he has had several episodes of either syncope or near syncope after his exercise routines.  In fact, the patient recently was at home when he had a sudden loss of consciousness with a fall to the floor but without injury.    With that complaint, he was able to contact his cardiologist who suggested stopping hydrochlorothiazide, but when he did so, he was experiencing profound vertigo.  He restarted the medication in the vertigo stopped.  However, now he has a continuous feeling of lightheadedness.  He states that he cannot bend over without losing his sense of balance and has significant difficulties when he comes from sit to stand with increased lightheadedness to the point that he has a sensation that he is about to faint.    The episode of syncope or near-syncope is not associated with any chest pain, shortness of breath, or palpitations.  However, in review of his medication list, it is noted that he is on 3 different antihypertensive medications.    The patient's Parkinson's tremor continues without any significant change.  He is of the opinion that the tremor does not interfere with activities of daily living.  He has noted a slight improvement in gait with physical therapy but states that he cannot tolerate the intensity of the physical therapy and would like to discontinue.      ROS:  GENERAL: No fever, chills, or weight loss.  SKIN: No rashes, itching or changes in color or texture of skin.  HEAD: No headaches or recent head trauma.  EYES: Visual acuity fine. No photophobia, ocular pain or diplopia.  EARS: Denies ear pain, discharge or vertigo.  NOSE: No loss of smell, no epistaxis or postnasal drip.  MOUTH & THROAT: No hoarseness or  change in voice. No excessive gum bleeding.  NODES: Denies swollen glands.  CHEST: Denies HDZ, cyanosis, wheezing, cough and sputum production.  CARDIOVASCULAR: Denies chest pain, PND, orthopnea or reduced exercise tolerance.  ABDOMEN: Appetite fine. No weight loss. Denies diarrhea, abdominal pain, hematemesis or blood in stool.  URINARY: No flank pain, dysuria or hematuria.  PERIPHERAL VASCULAR: No claudication or cyanosis.  MUSCULOSKELETAL: No joint stiffness or swelling.   NEUROLOGIC: No history of seizures, paralysis, or paresthesia.    The patient's past history, family history social history were reviewed with the patient and his electronic medical record.    PE:   VITAL SIGNS:  Blood pressure 118/60, pulse 78, weight 101.8 kg, height 6 ft 1 in, BMI 29.61  APPEARANCE: Well nourished, well developed, in no acute distress.    HEAD: Normocephalic, atraumatic.  EYES: PERRL. EOMI.  Non-icteric sclerae.    EARS: TM's intact. Light reflex normal. No retraction or perforation.    NOSE: Mucosa pink. Airway clear.  MOUTH & THROAT: No tonsillar enlargement. No pharyngeal erythema or exudate. No stridor.  NECK: Supple. No bruits.  CHEST: Lungs clear to auscultation.  CARDIOVASCULAR: Regular rhythm without significant murmurs.  MUSCULOSKELETAL:  No bony deformity seen.  Muscle tone is normal in both upper and lower extremities. The patient has obvious atrophy in the intrinsic hand muscles in the right hand.  NEUROLOGIC:   Mental Status:  The patient is well oriented to person, time, place, and situation.  The patient is attentive to the environment and cooperative for the exam.  Cranial Nerves: II-XII grossly intact. Fundoscopic exam is normal.  No hemorrhage, exudate or papilledema is present. The extraocular muscles are intact in the cardinal directions of gaze.  No ptosis is present. Facial features are symmetrical.  Speech is normal in fluency, diction, and phrasing.  Tongue protrudes in the midline.    Gait and  Station:  Romberg is negative.  Good alternate arm swing with normal gait.  Motor:  No downdrift of either arm when held at shoulder level.  Manual muscle testing of proximal and distal muscles of both upper and lower extremities is normal.   Sensory:  Patient exhibits diminished appreciation of pinprick in the right 5th finger and medial 1/2 4th finger.  Cerebellar:  Finger to nose done well.  Alternating movements intact. Resting tremor is evident in the right hand while seated that occurs intermittently.  This is atypical Parkinson's like tremor.    Assessment:  1.  Parkinson's disease, symptomatic  2.  Right ulnar neuropathy  3.  Orthostatic hypotension    Recommendations:  1.  The patient will referred back to his primary care physician to adjust and monitor his antihypertensive medications.  The symptoms as described suggest orthostatic hypotension that may be related to his medications.  2.  The patient was advised to discontinue physical therapy as he has reached maximum benefit.  3.  Return to Neurology as needed.    This was a 35 min visit with the patient with over 50% of the time spent counseling the patient regarding the symptoms of orthostatic hypotension and medication management.  Reflexes:  Stretch reflexes are 2+ both upper and lower extremities.  Plantar stimulation is flexor bilaterally and no pathological reflexes are seen        This note is generated with speech recognition software and is subject to transcription error and sound alike phrases that may be missed by proofreading.

## 2018-08-22 ENCOUNTER — HOSPITAL ENCOUNTER (OUTPATIENT)
Dept: RADIOLOGY | Facility: HOSPITAL | Age: 78
Discharge: HOME | End: 2018-08-22
Attending: FAMILY MEDICINE
Payer: MEDICARE

## 2018-08-22 ENCOUNTER — OFFICE VISIT (OUTPATIENT)
Dept: INTERNAL MEDICINE | Facility: CLINIC | Age: 78
End: 2018-08-22
Payer: MEDICARE

## 2018-08-22 VITALS
HEIGHT: 73 IN | WEIGHT: 222.88 LBS | OXYGEN SATURATION: 98 % | TEMPERATURE: 97 F | HEART RATE: 72 BPM | DIASTOLIC BLOOD PRESSURE: 84 MMHG | BODY MASS INDEX: 29.54 KG/M2 | SYSTOLIC BLOOD PRESSURE: 132 MMHG

## 2018-08-22 DIAGNOSIS — Z90.5 HISTORY OF NEPHRECTOMY, UNILATERAL: ICD-10-CM

## 2018-08-22 DIAGNOSIS — I15.2 HYPERTENSION ASSOCIATED WITH DIABETES: Chronic | ICD-10-CM

## 2018-08-22 DIAGNOSIS — E78.2 MIXED HYPERLIPIDEMIA: ICD-10-CM

## 2018-08-22 DIAGNOSIS — I77.9 RIGHT-SIDED CAROTID ARTERY DISEASE: ICD-10-CM

## 2018-08-22 DIAGNOSIS — N18.30 TYPE 2 DIABETES MELLITUS WITH STAGE 3 CHRONIC KIDNEY DISEASE, WITHOUT LONG-TERM CURRENT USE OF INSULIN: Chronic | ICD-10-CM

## 2018-08-22 DIAGNOSIS — G20.A1 PARKINSON DISEASE, SYMPTOMATIC: ICD-10-CM

## 2018-08-22 DIAGNOSIS — R55 SYNCOPE AND COLLAPSE: ICD-10-CM

## 2018-08-22 DIAGNOSIS — Z12.5 ENCOUNTER FOR SCREENING FOR MALIGNANT NEOPLASM OF PROSTATE: ICD-10-CM

## 2018-08-22 DIAGNOSIS — Z00.00 ROUTINE GENERAL MEDICAL EXAMINATION AT A HEALTH CARE FACILITY: Primary | ICD-10-CM

## 2018-08-22 DIAGNOSIS — E11.59 HYPERTENSION ASSOCIATED WITH DIABETES: Chronic | ICD-10-CM

## 2018-08-22 DIAGNOSIS — M17.0 PRIMARY OSTEOARTHRITIS OF BOTH KNEES: Chronic | ICD-10-CM

## 2018-08-22 DIAGNOSIS — N18.30 STAGE 3 CHRONIC KIDNEY DISEASE: Chronic | ICD-10-CM

## 2018-08-22 DIAGNOSIS — E11.22 TYPE 2 DIABETES MELLITUS WITH STAGE 3 CHRONIC KIDNEY DISEASE, WITHOUT LONG-TERM CURRENT USE OF INSULIN: Chronic | ICD-10-CM

## 2018-08-22 DIAGNOSIS — M06.09 RHEUMATOID ARTHRITIS OF MULTIPLE SITES WITH NEGATIVE RHEUMATOID FACTOR: ICD-10-CM

## 2018-08-22 DIAGNOSIS — E55.9 VITAMIN D INSUFFICIENCY: Chronic | ICD-10-CM

## 2018-08-22 DIAGNOSIS — Z85.528 HISTORY OF KIDNEY CANCER: ICD-10-CM

## 2018-08-22 DIAGNOSIS — M1A.09X1 IDIOPATHIC CHRONIC GOUT OF MULTIPLE SITES WITH TOPHUS: ICD-10-CM

## 2018-08-22 DIAGNOSIS — Z96.652 HISTORY OF TOTAL LEFT KNEE REPLACEMENT: ICD-10-CM

## 2018-08-22 PROCEDURE — 99999 PR PBB SHADOW E&M-EST. PATIENT-LVL IV: CPT | Mod: PBBFAC,,, | Performed by: FAMILY MEDICINE

## 2018-08-22 PROCEDURE — 99499 UNLISTED E&M SERVICE: CPT | Mod: HCNC,S$GLB,, | Performed by: FAMILY MEDICINE

## 2018-08-22 PROCEDURE — 3079F DIAST BP 80-89 MM HG: CPT | Mod: CPTII,S$GLB,, | Performed by: FAMILY MEDICINE

## 2018-08-22 PROCEDURE — 93880 EXTRACRANIAL BILAT STUDY: CPT | Mod: TC,PO

## 2018-08-22 PROCEDURE — 99397 PER PM REEVAL EST PAT 65+ YR: CPT | Mod: S$GLB,,, | Performed by: FAMILY MEDICINE

## 2018-08-22 PROCEDURE — 3075F SYST BP GE 130 - 139MM HG: CPT | Mod: CPTII,S$GLB,, | Performed by: FAMILY MEDICINE

## 2018-08-22 PROCEDURE — 93880 EXTRACRANIAL BILAT STUDY: CPT | Mod: 26,,, | Performed by: RADIOLOGY

## 2018-08-22 RX ORDER — NAPROXEN SODIUM 220 MG/1
81 TABLET, FILM COATED ORAL DAILY
COMMUNITY

## 2018-08-22 NOTE — PROGRESS NOTES
Subjective:       Patient ID: Pablito Givens Jr. is a 78 y.o. male.    Chief Complaint: Follow-up    78-year-old male patient with Patient Active Problem List:     Hypertension associated with diabetes     Testosterone deficiency     Osteoarthritis of both knees     Vitamin D insufficiency     Type 2 diabetes mellitus with stage 3 chronic kidney disease     History of kidney cancer     History of total left knee replacement     Neuropathy     Primary osteoarthritis of right elbow     Stage 3 chronic kidney disease     History of nephrectomy, unilateral     Sacroiliitis     Rheumatoid arthritis of multiple sites with negative rheumatoid factor     Idiopathic chronic gout of multiple sites with tophus     Long-term current use of high risk medication other than anticoagulant     Lateral epicondylitis of right elbow     Mixed hyperlipidemia     Right-sided carotid artery disease     Ulnar neuropathy at elbow of right upper extremity     Parkinson disease, symptomatic     Diabetes mellitus without complication     Cubital tunnel syndrome on left  Here for routine annual physicals.  Patient reports that he has been taking his medications regularly and had occasional syncopal episodes in the past most recent was a week ago.   Reports that he has been having faint episodes of syncope lasting few seconds  Denies any worsening gout exacerbations  Patient has been followed by Neurology secondary to Parkinson's disease and continues to have right hand tremor  Denies any chest pain or difficulty breathing, changes in bowel movements or trouble with urine        Review of Systems   Constitutional: Negative for appetite change and fatigue.   Eyes: Negative for visual disturbance.   Respiratory: Negative for shortness of breath.    Cardiovascular: Negative for chest pain, palpitations and leg swelling.   Gastrointestinal: Negative for abdominal pain, nausea and vomiting.   Musculoskeletal: Negative for myalgias.   Skin: Negative  "for rash.   Neurological: Positive for tremors and syncope. Negative for dizziness, weakness, light-headedness, numbness and headaches.   Psychiatric/Behavioral: Negative for sleep disturbance.         /84 (BP Location: Right arm, Patient Position: Sitting)   Pulse 72   Temp 96.7 °F (35.9 °C) (Tympanic)   Ht 6' 1" (1.854 m)   Wt 101.1 kg (222 lb 14.2 oz)   SpO2 98%   BMI 29.41 kg/m²   Objective:      Physical Exam   Constitutional: He is oriented to person, place, and time. He appears well-developed and well-nourished.   HENT:   Head: Normocephalic and atraumatic.   Mouth/Throat: Oropharynx is clear and moist.   Cardiovascular: Normal rate, regular rhythm and normal heart sounds.   No murmur heard.  No significant carotid bruit heard on the right side   Pulmonary/Chest: Effort normal and breath sounds normal. He has no wheezes.   Abdominal: Soft. Bowel sounds are normal. There is no tenderness.   Musculoskeletal: He exhibits no edema.   Neurological: He is alert and oriented to person, place, and time.   Positive for tremor   Skin: Skin is warm and dry. No rash noted.   Psychiatric: He has a normal mood and affect.         Assessment:       1. Routine general medical examination at a health care facility    2. Hypertension associated with diabetes    3. Stage 3 chronic kidney disease    4. Mixed hyperlipidemia    5. Type 2 diabetes mellitus with stage 3 chronic kidney disease, without long-term current use of insulin    6. Idiopathic chronic gout of multiple sites with tophus    7. Parkinson disease, symptomatic    8. History of nephrectomy, unilateral    9. History of total left knee replacement    10. History of kidney cancer    11. Vitamin D insufficiency    12. Primary osteoarthritis of both knees    13. Rheumatoid arthritis of multiple sites with negative rheumatoid factor    14. Right-sided carotid artery disease    15. Encounter for screening for malignant neoplasm of prostate     16. Syncope and " collapse         Plan:   Routine general medical examination at a health care facility  -     CBC auto differential; Future; Expected date: 08/22/2018  -     Comprehensive metabolic panel; Future; Expected date: 08/22/2018  -     Lipid panel; Future; Expected date: 08/22/2018  -     TSH; Future; Expected date: 08/22/2018  -     Vitamin D; Future; Expected date: 08/22/2018  -     PSA, Screening; Future; Expected date: 08/22/2018  Vital signs stable today.  Clinical exam stable.  Encouraged to maintain lifestyle modifications with low-fat and low-cholesterol diet and exercise 30 min daily  Will check fasting labs today    Hypertension associated with diabetes  -     Comprehensive metabolic panel; Future; Expected date: 08/22/2018  -     Lipid panel; Future; Expected date: 08/22/2018  -     TSH; Future; Expected date: 08/22/2018  Blood pressure is stable today currently on hydrochlorothiazide 12.5 mg lisinopril 20 mg Procardia 60 mg    Stage 3 chronic kidney disease  -     Comprehensive metabolic panel; Future; Expected date: 08/22/2018  Encouraged to drink adequate fluids and avoid over-the-counter NSAIDs    Mixed hyperlipidemia  -     Lipid panel; Future; Expected date: 08/22/2018  Currently on Lipitor 40 mg daily    Type 2 diabetes mellitus with stage 3 chronic kidney disease, without long-term current use of insulin  -     Comprehensive metabolic panel; Future; Expected date: 08/22/2018  -     Lipid panel; Future; Expected date: 08/22/2018  -     Hemoglobin A1c; Future; Expected date: 08/22/2018  -     Microalbumin/creatinine urine ratio; Future; Expected date: 08/22/2018  -     Ambulatory referral to Optometry  Patient due for eye exam , taking glimepiride 1 mg daily  Up-to-date with diabetic foot exam  Maintain lifestyle modifications with 1800 ADA low-fat and low-cholesterol diet and exercise 30 min daily      Idiopathic chronic gout of multiple sites with tophus  -     Uric acid; Future; Expected date:  08/22/2018  Stable on Uloric 40 mg daily    Parkinson disease, symptomatic-followed by neurology    History of kidney cancer  History of nephrectomy, unilateral      Primary osteoarthritis of both knees   History of total left knee replacement  Vitamin D insufficiency  -     Vitamin D; Future; Expected date: 08/22/2018  Rheumatoid arthritis of multiple sites with negative rheumatoid factor  To be followed by Rheumatology currently taking vitamin-D by prescription, voltaren gel, prednisone, methotrexate and folic acid    Right-sided carotid artery disease  -     US Carotid Bilateral; Future; Expected date: 08/22/2018  Syncope and collapse   -     US Carotid Bilateral; Future; Expected date: 08/22/2018    Secondary to few episodes of presyncope/syncope and with history of right-sided carotid artery disease Will plan to repeat carotid ultrasound  If any worsening will refer to vascular specialist  Currently taking aspirin 81 mg daily and statins    Encounter for screening for malignant neoplasm of prostate   -     PSA, Screening; Future; Expected date: 08/22/2018

## 2018-08-23 DIAGNOSIS — I77.9 RIGHT-SIDED CAROTID ARTERY DISEASE: ICD-10-CM

## 2018-08-23 DIAGNOSIS — R97.20 ELEVATED PSA, LESS THAN 10 NG/ML: Primary | ICD-10-CM

## 2018-08-24 ENCOUNTER — OFFICE VISIT (OUTPATIENT)
Dept: OPHTHALMOLOGY | Facility: CLINIC | Age: 78
End: 2018-08-24
Payer: MEDICARE

## 2018-08-24 ENCOUNTER — OFFICE VISIT (OUTPATIENT)
Dept: NEUROLOGY | Facility: CLINIC | Age: 78
End: 2018-08-24
Payer: MEDICARE

## 2018-08-24 VITALS
BODY MASS INDEX: 29.74 KG/M2 | SYSTOLIC BLOOD PRESSURE: 150 MMHG | HEART RATE: 72 BPM | DIASTOLIC BLOOD PRESSURE: 90 MMHG | HEIGHT: 73 IN | WEIGHT: 224.44 LBS

## 2018-08-24 DIAGNOSIS — Z96.1 PSEUDOPHAKIA OF BOTH EYES: ICD-10-CM

## 2018-08-24 DIAGNOSIS — G20.A1 PARKINSON DISEASE, SYMPTOMATIC: Primary | ICD-10-CM

## 2018-08-24 DIAGNOSIS — G56.21 ULNAR NEUROPATHY AT ELBOW OF RIGHT UPPER EXTREMITY: ICD-10-CM

## 2018-08-24 DIAGNOSIS — E11.9 DIABETES MELLITUS TYPE 2 WITHOUT RETINOPATHY: Primary | ICD-10-CM

## 2018-08-24 DIAGNOSIS — H52.4 BILATERAL PRESBYOPIA: ICD-10-CM

## 2018-08-24 PROCEDURE — 99999 PR PBB SHADOW E&M-EST. PATIENT-LVL III: CPT | Mod: PBBFAC,,, | Performed by: PSYCHIATRY & NEUROLOGY

## 2018-08-24 PROCEDURE — 99999 PR PBB SHADOW E&M-EST. PATIENT-LVL II: CPT | Mod: PBBFAC,,, | Performed by: OPTOMETRIST

## 2018-08-24 PROCEDURE — 3077F SYST BP >= 140 MM HG: CPT | Mod: CPTII,S$GLB,, | Performed by: PSYCHIATRY & NEUROLOGY

## 2018-08-24 PROCEDURE — 99499 UNLISTED E&M SERVICE: CPT | Mod: HCNC,S$GLB,, | Performed by: OPTOMETRIST

## 2018-08-24 PROCEDURE — 92015 DETERMINE REFRACTIVE STATE: CPT | Mod: S$GLB,,, | Performed by: OPTOMETRIST

## 2018-08-24 PROCEDURE — 99212 OFFICE O/P EST SF 10 MIN: CPT | Mod: S$GLB,,, | Performed by: PSYCHIATRY & NEUROLOGY

## 2018-08-24 PROCEDURE — 92014 COMPRE OPH EXAM EST PT 1/>: CPT | Mod: S$GLB,,, | Performed by: OPTOMETRIST

## 2018-08-24 PROCEDURE — 3080F DIAST BP >= 90 MM HG: CPT | Mod: CPTII,S$GLB,, | Performed by: PSYCHIATRY & NEUROLOGY

## 2018-08-24 RX ORDER — CARBIDOPA AND LEVODOPA 25; 100 MG/1; MG/1
1 TABLET ORAL 2 TIMES DAILY
Qty: 60 TABLET | Refills: 11 | Status: SHIPPED | OUTPATIENT
Start: 2018-08-24 | End: 2019-08-24

## 2018-08-24 NOTE — LETTER
August 24, 2018      Daily Eller MD  [address] [facility] [address]           [facility]  [address] [place] [address]  Phone: [phone]  Fax: [phone]          Patient: Pablito Givens Jr.   MR Number: [id number]   YOB: 1940   Date of Visit: 8/24/2018       Dear Dr. Daily Eller:    Thank you for referring Pablito Givens to me for evaluation. Attached you will find relevant portions of my assessment and plan of care.    If you have questions, please do not hesitate to call me. I look forward to following Pablito Givens along with you.    Sincerely,    Josué Cook, OD    Enclosure  CC:  No Recipients    If you would like to receive this communication electronically, please contact [email or web address] or [phone] to request more information on [employer] Link access.    For providers and/or their staff who would like to refer a patient to [facility], please contact us through our one-stop-shop provider referral line, [facility], at [phone].    If you feel you have received this communication in error or would no longer like to receive these types of communications, please e-mail [email or web address]

## 2018-08-24 NOTE — PATIENT INSTRUCTIONS
Diabetes mellitus type 2 without retinopathy     Pseudophakia of both eyes     Bilateral presbyopia        No Background Diabetic Retinopathy     Stable IOL OU.     Dispense Final Rx for glasses.  RTC 1 year  Discussed above and answered questions.

## 2018-08-24 NOTE — PROGRESS NOTES
"This is a 78-year-old patient with known Parkinson's disease and right ulnar neuropathy who returns today indicating that he is not certain why he has been scheduled for a 2nd appointment when he was seen just several days ago.  He is somewhat confused as to his recent visits and is asking for clarification.    It was pointed out to the patient that he has already had nerve conduction study which indicates the presence of an ulnar neuropathy at the elbow.  He has been seen by Orthopedic surgery who recommended a trial physical therapy but then also recommended a follow-up visit to discuss and consider the possibility of ulnar transposition.  Apparently this was not scheduled at the time of that visit however.    The patient is also concerned about the persistence of tremor which is part of his Parkinson's disease syndrome.  In the past, the patient had declined offers of medication feeling that the tremor was not interfering with his activities of daily living and that he was already on "too much medication."  On today's visit however, he is asking if there is medication available.    Brief examination done today continues to show the presence of his Parkinson's tremor in the right hand which is a resting tremor that is aggravated by ambulation.  The patient does not exhibit any festination of gait, shuffling gait, or delay in initiation of gait.  He also exhibits the signs of an ulnar neuropathy in the right hand with diminished appreciation of pinprick in the 4th and 5th fingers of the right hand.    Recommendations:  1.  Schedule follow-up Orthopedic surgery visit to discuss treatment options for all neuropathy  2.  Trial of carbidopa/levodopa 25/100 1 tablet twice a day for his Parkinson's syndrome  3.  Return to Neurology as needed but within 6 months.    This was a 25 min face-to-face visit with the patient with over 50% time spent counseling the patient regarding the treatment of Parkinson's disease and ulnar " neuropathy.  This note is generated with speech recognition software and is subject to transcription error and sound alike phrases that may be missed by proofreading.

## 2018-08-24 NOTE — PROGRESS NOTES
HPI     NIDDM exam.  Patient had to start using reading glasses for small print.  Last eye exam 12/29/2016 TRF.  update glasses RX.    Last edited by Jennie Mathew on 8/24/2018  9:52 AM. (History)            Assessment /Plan     For exam results, see Encounter Report.    Diabetes mellitus type 2 without retinopathy    Pseudophakia of both eyes    Bilateral presbyopia      No Background Diabetic Retinopathy    Stable IOL OU.    Dispense Final Rx for glasses.  RTC 1 year  Discussed above and answered questions.

## 2018-08-28 DIAGNOSIS — F51.04 CHRONIC INSOMNIA: ICD-10-CM

## 2018-08-28 RX ORDER — ZOLPIDEM TARTRATE 5 MG/1
TABLET ORAL
Qty: 90 TABLET | Refills: 0 | Status: SHIPPED | OUTPATIENT
Start: 2018-08-28 | End: 2018-11-18 | Stop reason: SDUPTHER

## 2018-08-28 NOTE — TELEPHONE ENCOUNTER
----- Message from Kayy Fritz sent at 8/28/2018  2:24 PM CDT -----  Contact: pt  1. What is the name of the medication you are requesting? Ambien  2. What is the dose? ?  3. How do you take the medication? Orally, topically, etc? orally  4. How often do you take this medication? ?  5. Do you need a 30 day or 90 day supply? 90  6. How many refills are you requesting? ?  7. What is your preferred pharmacy and location of the pharmacy?   [facility] [address] [place], [place] - [address]  [address]  [place] [address]  Phone: [phone] Fax: [phone]  8. Who can we contact with further questions? Pt at [phone]  Thank you

## 2018-08-28 NOTE — TELEPHONE ENCOUNTER
Spoke with patient about refill on ambien. Informed patient Dr. Eller sent in 90 day supply today 8/28/18 to his preferred pharmacy . Instructed pt to call pharmacy and see if it is ready to be picked up. Pt. Verbalized understanding

## 2018-09-10 DIAGNOSIS — M06.09 RHEUMATOID ARTHRITIS OF MULTIPLE SITES WITH NEGATIVE RHEUMATOID FACTOR: ICD-10-CM

## 2018-09-10 DIAGNOSIS — M25.529 ELBOW PAIN, UNSPECIFIED LATERALITY: Primary | ICD-10-CM

## 2018-09-10 RX ORDER — FOLIC ACID 1 MG/1
TABLET ORAL
Qty: 30 TABLET | Refills: 3 | Status: SHIPPED | OUTPATIENT
Start: 2018-09-10 | End: 2019-01-06 | Stop reason: SDUPTHER

## 2018-10-05 ENCOUNTER — TELEPHONE (OUTPATIENT)
Dept: NEUROLOGY | Facility: CLINIC | Age: 78
End: 2018-10-05

## 2018-10-05 NOTE — TELEPHONE ENCOUNTER
----- Message from Huber Askew MD sent at 10/5/2018 12:32 PM CDT -----  Contact: pt  He may stop the Carbidopa/levodopa  ----- Message -----  From: Vani Monique LPN  Sent: 10/5/2018  11:23 AM  To: Huber Askew MD     Patient having pain in arm stated it is nerve damage and that the Carbidopa/ Levodpopa 25/100 mg you started him on wasn,t working after 12 days he stopped taking it, I  told patient to make an appointment with his pcp for the pain in arm please advise per patient  ----- Message -----  From: Brigitte Burris  Sent: 10/5/2018   9:31 AM  To: Azar RACHEL Staff    He's calling stating that he is in severe pain in his right arm, wanted to speak to someone, please [phone]

## 2018-10-07 DIAGNOSIS — N18.30 TYPE 2 DIABETES MELLITUS WITH STAGE 3 CHRONIC KIDNEY DISEASE, WITHOUT LONG-TERM CURRENT USE OF INSULIN: Chronic | ICD-10-CM

## 2018-10-07 DIAGNOSIS — E11.22 TYPE 2 DIABETES MELLITUS WITH STAGE 3 CHRONIC KIDNEY DISEASE, WITHOUT LONG-TERM CURRENT USE OF INSULIN: Chronic | ICD-10-CM

## 2018-10-07 RX ORDER — GLIMEPIRIDE 1 MG/1
TABLET ORAL
Qty: 90 TABLET | Refills: 0 | Status: SHIPPED | OUTPATIENT
Start: 2018-10-07 | End: 2019-01-06 | Stop reason: SDUPTHER

## 2018-10-07 RX ORDER — COLCHICINE 0.6 MG/1
TABLET, FILM COATED ORAL
Qty: 30 TABLET | Refills: 10 | Status: SHIPPED | OUTPATIENT
Start: 2018-10-07

## 2018-10-09 ENCOUNTER — OFFICE VISIT (OUTPATIENT)
Dept: INTERNAL MEDICINE | Facility: CLINIC | Age: 78
End: 2018-10-09
Payer: MEDICARE

## 2018-10-09 VITALS
DIASTOLIC BLOOD PRESSURE: 82 MMHG | SYSTOLIC BLOOD PRESSURE: 130 MMHG | HEART RATE: 86 BPM | TEMPERATURE: 98 F | WEIGHT: 220.69 LBS | BODY MASS INDEX: 29.25 KG/M2 | OXYGEN SATURATION: 97 % | HEIGHT: 73 IN

## 2018-10-09 DIAGNOSIS — I65.21 STENOSIS OF RIGHT CAROTID ARTERY: ICD-10-CM

## 2018-10-09 DIAGNOSIS — E78.2 MIXED HYPERLIPIDEMIA: ICD-10-CM

## 2018-10-09 DIAGNOSIS — N18.30 TYPE 2 DIABETES MELLITUS WITH STAGE 3 CHRONIC KIDNEY DISEASE, WITHOUT LONG-TERM CURRENT USE OF INSULIN: Chronic | ICD-10-CM

## 2018-10-09 DIAGNOSIS — G89.29 CHRONIC PAIN OF RIGHT ELBOW: Primary | ICD-10-CM

## 2018-10-09 DIAGNOSIS — M19.021 PRIMARY OSTEOARTHRITIS OF RIGHT ELBOW: ICD-10-CM

## 2018-10-09 DIAGNOSIS — M25.521 CHRONIC PAIN OF RIGHT ELBOW: Primary | ICD-10-CM

## 2018-10-09 DIAGNOSIS — E11.59 HYPERTENSION ASSOCIATED WITH DIABETES: Chronic | ICD-10-CM

## 2018-10-09 DIAGNOSIS — E11.22 TYPE 2 DIABETES MELLITUS WITH STAGE 3 CHRONIC KIDNEY DISEASE, WITHOUT LONG-TERM CURRENT USE OF INSULIN: Chronic | ICD-10-CM

## 2018-10-09 DIAGNOSIS — I15.2 HYPERTENSION ASSOCIATED WITH DIABETES: Chronic | ICD-10-CM

## 2018-10-09 DIAGNOSIS — G20.A1 PARKINSON DISEASE, SYMPTOMATIC: ICD-10-CM

## 2018-10-09 DIAGNOSIS — G56.21 ULNAR NEUROPATHY AT ELBOW OF RIGHT UPPER EXTREMITY: ICD-10-CM

## 2018-10-09 PROBLEM — E11.9 DIABETES MELLITUS WITHOUT COMPLICATION: Status: RESOLVED | Noted: 2017-10-12 | Resolved: 2018-10-09

## 2018-10-09 PROBLEM — M77.11 LATERAL EPICONDYLITIS OF RIGHT ELBOW: Status: RESOLVED | Noted: 2017-12-14 | Resolved: 2018-10-09

## 2018-10-09 PROCEDURE — 96372 THER/PROPH/DIAG INJ SC/IM: CPT | Mod: PBBFAC,PO

## 2018-10-09 PROCEDURE — 99213 OFFICE O/P EST LOW 20 MIN: CPT | Mod: PBBFAC,PO | Performed by: FAMILY MEDICINE

## 2018-10-09 PROCEDURE — 3075F SYST BP GE 130 - 139MM HG: CPT | Mod: CPTII,,, | Performed by: FAMILY MEDICINE

## 2018-10-09 PROCEDURE — 1101F PT FALLS ASSESS-DOCD LE1/YR: CPT | Mod: CPTII,,, | Performed by: FAMILY MEDICINE

## 2018-10-09 PROCEDURE — 99214 OFFICE O/P EST MOD 30 MIN: CPT | Mod: 25,S$PBB,, | Performed by: FAMILY MEDICINE

## 2018-10-09 PROCEDURE — 99999 PR PBB SHADOW E&M-EST. PATIENT-LVL III: CPT | Mod: PBBFAC,,, | Performed by: FAMILY MEDICINE

## 2018-10-09 PROCEDURE — 3079F DIAST BP 80-89 MM HG: CPT | Mod: CPTII,,, | Performed by: FAMILY MEDICINE

## 2018-10-09 RX ORDER — KETOROLAC TROMETHAMINE 30 MG/ML
30 INJECTION, SOLUTION INTRAMUSCULAR; INTRAVENOUS
Status: COMPLETED | OUTPATIENT
Start: 2018-10-09 | End: 2018-10-09

## 2018-10-09 RX ADMIN — KETOROLAC TROMETHAMINE 30 MG: 30 INJECTION, SOLUTION INTRAMUSCULAR; INTRAVENOUS at 11:10

## 2018-10-09 NOTE — PROGRESS NOTES
Subjective:       Patient ID: Pablito Givens Jr. is a 78 y.o. male.    Chief Complaint: right elbow pain (radiates down right arm to wrist. pins and needles sensation r wrist) and weakness (r hand trouble lifting things)    78-year-old male patient with Patient Active Problem List:     Hypertension associated with diabetes     Testosterone deficiency     Osteoarthritis of both knees     Vitamin D insufficiency     Type 2 diabetes mellitus with stage 3 chronic kidney disease     History of kidney cancer     History of total left knee replacement     Neuropathy     Primary osteoarthritis of right elbow     Stage 3 chronic kidney disease     History of nephrectomy, unilateral     Sacroiliitis     Rheumatoid arthritis of multiple sites with negative rheumatoid factor     Idiopathic chronic gout of multiple sites with tophus     Long-term current use of high risk medication other than anticoagulant     Mixed hyperlipidemia     Right-sided carotid artery disease     Ulnar neuropathy at elbow of right upper extremity     Parkinson disease, symptomatic     Cubital tunnel syndrome on left  Here with complaint of severe right elbow pain, for the past 4-5 months which has been getting worse lately and reports pain as 9/10 today.   Patient had EMG study done showing ulnar neuropathy  Reports that he has been taking tramadol but no significant relief noted  Patient has been to orthopedic physician and has been getting physical therapy but no response noted  Reports that he is in severe pain and would like to know whether they could be anything else done for him  Reports tingling and numbness sensation radiating down to the fingers on the right side  Also complains of occasional pain shooting up to the right shoulder  No recent injury reported  Denies any fever with chills      Review of Systems   Constitutional: Negative for fever.   Eyes: Negative for visual disturbance.   Respiratory: Negative for shortness of breath.   "  Cardiovascular: Negative for chest pain, palpitations and leg swelling.   Gastrointestinal: Negative for abdominal pain, nausea and vomiting.   Musculoskeletal: Positive for arthralgias and myalgias. Negative for joint swelling.   Skin: Negative for color change and rash.   Neurological: Positive for numbness. Negative for weakness and headaches.   Psychiatric/Behavioral: Negative for sleep disturbance.         /82 (BP Location: Left arm, Patient Position: Sitting)   Pulse 86   Temp 98.1 °F (36.7 °C) (Tympanic)   Ht 6' 1" (1.854 m)   Wt 100.1 kg (220 lb 10.9 oz)   SpO2 97%   BMI 29.12 kg/m²   Objective:      Physical Exam   Constitutional: He is oriented to person, place, and time. He appears well-developed and well-nourished.   HENT:   Head: Normocephalic and atraumatic.   Mouth/Throat: Oropharynx is clear and moist.   Cardiovascular: Normal rate, regular rhythm and normal heart sounds.   No murmur heard.  Pulmonary/Chest: Effort normal and breath sounds normal. He has no wheezes.   Abdominal: Soft. Bowel sounds are normal. There is no tenderness.   Musculoskeletal: He exhibits tenderness. He exhibits no edema.   Positive for tenderness to the right elbow laterally on exam today with restricted range of motion with supination and pronation   Neurological: He is alert and oriented to person, place, and time.   Skin: Skin is warm and dry. No rash noted. No erythema.   Psychiatric: He has a normal mood and affect.       Xray of the right elbow-     Extensive degenerative changes noted with faint visualization of the fat pads anteriorly and posteriorly.  Marginal osteophyte formation throughout the elbow.  Prominent narrowing of the joint spaces..    Assessment:       1. Chronic pain of right elbow    2. Ulnar neuropathy at elbow of right upper extremity    3. Primary osteoarthritis of right elbow    4. Hypertension associated with diabetes    5. Parkinson disease, symptomatic    6. Type 2 diabetes " mellitus with stage 3 chronic kidney disease, without long-term current use of insulin    7. Stenosis of right carotid artery    8. Mixed hyperlipidemia        Plan:   Chronic pain of right elbow  Ulnar neuropathy at elbow of right upper extremity  Primary osteoarthritis of right elbow  -     Ambulatory referral to Orthopedics  -     ketorolac injection 30 mg; Inject 1 mL (30 mg total) into the muscle one time.  Toradol 30 mg IM x1 given today in the clinic  Reviewed x-rays done in March of 2018 showing arthritis changes in the right elbow  Secondary to underlying ulnar neuropathy and with significant arthritis changes in the right elbow patient was advised to go back to see orthopedic physician for any possible surgical option    Hypertension associated with diabetes-blood pressure stable today currently on hydrochlorothiazide 12.5 mg lisinopril 20 mg Procardia 60 mg daily    Parkinson disease, symptomatic-followed by Neurology on Sinemet    Type 2 diabetes mellitus with stage 3 chronic kidney disease, without long-term current use of insulin-currently taking Amaryl 1 mg daily    Stenosis of right carotid artery-continue aspirin and statins    Mixed hyperlipidemia-currently on Lipitor 40 mg daily

## 2018-10-10 ENCOUNTER — OFFICE VISIT (OUTPATIENT)
Dept: ORTHOPEDICS | Facility: CLINIC | Age: 78
End: 2018-10-10
Payer: MEDICARE

## 2018-10-10 VITALS
RESPIRATION RATE: 19 BRPM | DIASTOLIC BLOOD PRESSURE: 79 MMHG | HEART RATE: 80 BPM | BODY MASS INDEX: 29.25 KG/M2 | HEIGHT: 73 IN | SYSTOLIC BLOOD PRESSURE: 124 MMHG | WEIGHT: 220.69 LBS

## 2018-10-10 DIAGNOSIS — M25.521 RIGHT ELBOW PAIN: Primary | ICD-10-CM

## 2018-10-10 DIAGNOSIS — M77.11 RIGHT TENNIS ELBOW: ICD-10-CM

## 2018-10-10 DIAGNOSIS — G56.21 CUBITAL TUNNEL SYNDROME ON RIGHT: ICD-10-CM

## 2018-10-10 PROCEDURE — 3078F DIAST BP <80 MM HG: CPT | Mod: CPTII,,, | Performed by: PHYSICIAN ASSISTANT

## 2018-10-10 PROCEDURE — 99213 OFFICE O/P EST LOW 20 MIN: CPT | Mod: 25,S$PBB,, | Performed by: PHYSICIAN ASSISTANT

## 2018-10-10 PROCEDURE — 99999 PR PBB SHADOW E&M-EST. PATIENT-LVL IV: CPT | Mod: PBBFAC,,, | Performed by: PHYSICIAN ASSISTANT

## 2018-10-10 PROCEDURE — 99214 OFFICE O/P EST MOD 30 MIN: CPT | Mod: PBBFAC | Performed by: PHYSICIAN ASSISTANT

## 2018-10-10 PROCEDURE — 20605 DRAIN/INJ JOINT/BURSA W/O US: CPT | Mod: S$PBB,RT,, | Performed by: PHYSICIAN ASSISTANT

## 2018-10-10 PROCEDURE — 20605 DRAIN/INJ JOINT/BURSA W/O US: CPT | Mod: PBBFAC,RT | Performed by: PHYSICIAN ASSISTANT

## 2018-10-10 PROCEDURE — 3074F SYST BP LT 130 MM HG: CPT | Mod: CPTII,,, | Performed by: PHYSICIAN ASSISTANT

## 2018-10-10 PROCEDURE — 1101F PT FALLS ASSESS-DOCD LE1/YR: CPT | Mod: CPTII,,, | Performed by: PHYSICIAN ASSISTANT

## 2018-10-10 RX ORDER — METHYLPREDNISOLONE ACETATE 80 MG/ML
80 INJECTION, SUSPENSION INTRA-ARTICULAR; INTRALESIONAL; INTRAMUSCULAR; SOFT TISSUE
Status: COMPLETED | OUTPATIENT
Start: 2018-10-10 | End: 2018-10-10

## 2018-10-10 RX ADMIN — METHYLPREDNISOLONE ACETATE 80 MG: 80 INJECTION, SUSPENSION INTRALESIONAL; INTRAMUSCULAR; INTRASYNOVIAL; SOFT TISSUE at 09:10

## 2018-10-10 NOTE — LETTER
October 19, 2018      Daily Eller MD  [address] [facility] [address]           [facility]  [address] [place] [address]  Phone: [phone]  Fax: [phone]          Patient: Pablito Givens Jr.   MR Number: [id number]   YOB: 1940   Date of Visit: 10/10/2018       Dear Dr. Daily Eller:    Thank you for referring Pablito Givens to me for evaluation. Attached you will find relevant portions of my assessment and plan of care.    If you have questions, please do not hesitate to call me. I look forward to following Pablito Givens along with you.    Sincerely,    Dann Ozuna PA-C    Enclosure  CC:  No Recipients    If you would like to receive this communication electronically, please contact [email or web address] or [phone] to request more information on [employer] Link access.    For providers and/or their staff who would like to refer a patient to [facility], please contact us through our one-stop-shop provider referral line, [facility], at [phone].    If you feel you have received this communication in error or would no longer like to receive these types of communications, please e-mail [email or web address]
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
## 2018-10-19 ENCOUNTER — HOSPITAL ENCOUNTER (OUTPATIENT)
Dept: RADIOLOGY | Facility: HOSPITAL | Age: 78
Discharge: HOME | End: 2018-10-19
Attending: ORTHOPAEDIC SURGERY
Payer: MEDICARE

## 2018-10-19 ENCOUNTER — OFFICE VISIT (OUTPATIENT)
Dept: ORTHOPEDICS | Facility: CLINIC | Age: 78
End: 2018-10-19
Payer: MEDICARE

## 2018-10-19 VITALS
HEART RATE: 81 BPM | DIASTOLIC BLOOD PRESSURE: 88 MMHG | BODY MASS INDEX: 29.16 KG/M2 | SYSTOLIC BLOOD PRESSURE: 154 MMHG | WEIGHT: 220 LBS | HEIGHT: 73 IN

## 2018-10-19 DIAGNOSIS — R52 PAIN: ICD-10-CM

## 2018-10-19 DIAGNOSIS — M25.511 RIGHT SHOULDER PAIN, UNSPECIFIED CHRONICITY: ICD-10-CM

## 2018-10-19 DIAGNOSIS — M25.511 RIGHT SHOULDER PAIN, UNSPECIFIED CHRONICITY: Primary | ICD-10-CM

## 2018-10-19 DIAGNOSIS — R52 PAIN: Primary | ICD-10-CM

## 2018-10-19 DIAGNOSIS — G56.21 CUBITAL TUNNEL SYNDROME ON RIGHT: ICD-10-CM

## 2018-10-19 PROCEDURE — 99999 PR PBB SHADOW E&M-EST. PATIENT-LVL IV: CPT | Mod: PBBFAC,,, | Performed by: ORTHOPAEDIC SURGERY

## 2018-10-19 PROCEDURE — 73030 X-RAY EXAM OF SHOULDER: CPT | Mod: TC,RT

## 2018-10-19 PROCEDURE — 1101F PT FALLS ASSESS-DOCD LE1/YR: CPT | Mod: CPTII,S$GLB,, | Performed by: ORTHOPAEDIC SURGERY

## 2018-10-19 PROCEDURE — 99214 OFFICE O/P EST MOD 30 MIN: CPT | Mod: S$GLB,,, | Performed by: ORTHOPAEDIC SURGERY

## 2018-10-19 PROCEDURE — 3079F DIAST BP 80-89 MM HG: CPT | Mod: CPTII,S$GLB,, | Performed by: ORTHOPAEDIC SURGERY

## 2018-10-19 PROCEDURE — 73030 X-RAY EXAM OF SHOULDER: CPT | Mod: 26,RT,, | Performed by: RADIOLOGY

## 2018-10-19 PROCEDURE — 73080 X-RAY EXAM OF ELBOW: CPT | Mod: TC,RT

## 2018-10-19 PROCEDURE — 3077F SYST BP >= 140 MM HG: CPT | Mod: CPTII,S$GLB,, | Performed by: ORTHOPAEDIC SURGERY

## 2018-10-19 PROCEDURE — 73080 X-RAY EXAM OF ELBOW: CPT | Mod: 26,RT,, | Performed by: RADIOLOGY

## 2018-10-19 NOTE — LETTER
October 22, 2018      Dann Ozuna PA-C  [address] [place] [address] [address] Torsten [place] [id number]           [facility]  [address] [place] [address] [address] [address]  Phone: [phone]  Fax: [phone]          Patient: Pablito Givens Jr.   MR Number: [id number]   YOB: 1940   Date of Visit: 10/19/2018       Dear Dann Ozuna:    Thank you for referring Pablito Givesn to me for evaluation. Attached you will find relevant portions of my assessment and plan of care.    If you have questions, please do not hesitate to call me. I look forward to following Pablito Givens along with you.    Sincerely,    Rudolph Hunter MD    Enclosure  CC:  No Recipients    If you would like to receive this communication electronically, please contact [email or web address] or [phone] to request more information on [employer] access.    For providers and/or their staff who would like to refer a patient to [facility], please contact us through our one-stop-shop provider referral line, [facility], at [phone].    If you feel you have received this communication in error or would no longer like to receive these types of communications, please e-mail [email or web address]
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
## 2018-10-29 ENCOUNTER — HOSPITAL ENCOUNTER (OUTPATIENT)
Dept: RADIOLOGY | Facility: HOSPITAL | Age: 78
Discharge: HOME | End: 2018-10-29
Attending: ORTHOPAEDIC SURGERY
Payer: MEDICARE

## 2018-10-29 DIAGNOSIS — M25.511 RIGHT SHOULDER PAIN, UNSPECIFIED CHRONICITY: ICD-10-CM

## 2018-10-30 ENCOUNTER — TELEPHONE (OUTPATIENT)
Dept: ORTHOPEDICS | Facility: CLINIC | Age: 78
End: 2018-10-30

## 2018-10-30 ENCOUNTER — OFFICE VISIT (OUTPATIENT)
Dept: PODIATRY | Facility: CLINIC | Age: 78
End: 2018-10-30
Payer: MEDICARE

## 2018-10-30 VITALS
HEART RATE: 80 BPM | WEIGHT: 215.63 LBS | HEIGHT: 73 IN | SYSTOLIC BLOOD PRESSURE: 157 MMHG | BODY MASS INDEX: 28.58 KG/M2 | DIASTOLIC BLOOD PRESSURE: 86 MMHG

## 2018-10-30 DIAGNOSIS — L84 CORN OR CALLUS: ICD-10-CM

## 2018-10-30 DIAGNOSIS — B35.1 DERMATOPHYTOSIS OF NAIL: ICD-10-CM

## 2018-10-30 DIAGNOSIS — E11.49 TYPE II DIABETES MELLITUS WITH NEUROLOGICAL MANIFESTATIONS: Primary | ICD-10-CM

## 2018-10-30 PROCEDURE — 99213 OFFICE O/P EST LOW 20 MIN: CPT | Mod: PBBFAC,PO | Performed by: PODIATRIST

## 2018-10-30 PROCEDURE — 11721 DEBRIDE NAIL 6 OR MORE: CPT | Mod: 59,Q9,S$PBB, | Performed by: PODIATRIST

## 2018-10-30 PROCEDURE — 11056 PARNG/CUTG B9 HYPRKR LES 2-4: CPT | Mod: Q9,T2,T5,PBBFAC,PO | Performed by: PODIATRIST

## 2018-10-30 PROCEDURE — 11056 PARNG/CUTG B9 HYPRKR LES 2-4: CPT | Mod: Q9,S$PBB,, | Performed by: PODIATRIST

## 2018-10-30 PROCEDURE — 99999 PR PBB SHADOW E&M-EST. PATIENT-LVL III: CPT | Mod: PBBFAC,,, | Performed by: PODIATRIST

## 2018-10-30 PROCEDURE — 11721 DEBRIDE NAIL 6 OR MORE: CPT | Mod: Q9,PBBFAC,PO | Performed by: PODIATRIST

## 2018-10-30 PROCEDURE — 99499 UNLISTED E&M SERVICE: CPT | Mod: S$PBB,,, | Performed by: PODIATRIST
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
## 2018-10-30 NOTE — TELEPHONE ENCOUNTER
Pt called back returning or call. He stated that he did not want to go through with the MRI due to no medication. I offered the pt that we can reschedule his apt with medication or I can send his order off for an open MRI. Pt didn't want that option. Pt stated he would like a 2nd opinion. Pt states he was in a lot of pain. Pt is now seeing dr. Noe on 11/2. Pt understood.

## 2018-10-30 NOTE — TELEPHONE ENCOUNTER
Left message for pt to call back.      ----- Message from Carly Sanchez sent at 10/30/2018  2:44 PM CDT -----  Contact: Patient  Patient needs to speak to nurse about an MRI that he did not get, please call him back at [phone]. Thank you

## 2018-11-02 ENCOUNTER — OFFICE VISIT (OUTPATIENT)
Dept: ORTHOPEDICS | Facility: CLINIC | Age: 78
End: 2018-11-02
Payer: MEDICARE

## 2018-11-02 ENCOUNTER — OFFICE VISIT (OUTPATIENT)
Dept: PHYSICAL MEDICINE AND REHAB | Facility: CLINIC | Age: 78
End: 2018-11-02
Payer: MEDICARE

## 2018-11-02 VITALS
BODY MASS INDEX: 28.49 KG/M2 | HEIGHT: 73 IN | DIASTOLIC BLOOD PRESSURE: 90 MMHG | HEART RATE: 76 BPM | WEIGHT: 215 LBS | SYSTOLIC BLOOD PRESSURE: 150 MMHG

## 2018-11-02 VITALS
SYSTOLIC BLOOD PRESSURE: 154 MMHG | HEART RATE: 80 BPM | DIASTOLIC BLOOD PRESSURE: 88 MMHG | WEIGHT: 215 LBS | BODY MASS INDEX: 28.49 KG/M2 | HEIGHT: 73 IN

## 2018-11-02 DIAGNOSIS — M75.81 TENDINITIS OF RIGHT ROTATOR CUFF: ICD-10-CM

## 2018-11-02 DIAGNOSIS — G56.21 CUBITAL TUNNEL SYNDROME ON RIGHT: ICD-10-CM

## 2018-11-02 DIAGNOSIS — M54.12 CERVICAL RADICULOPATHY: Primary | ICD-10-CM

## 2018-11-02 DIAGNOSIS — M19.029 ELBOW ARTHRITIS: ICD-10-CM

## 2018-11-02 DIAGNOSIS — M79.18 MYOFASCIAL PAIN: ICD-10-CM

## 2018-11-02 PROBLEM — G56.22 CUBITAL TUNNEL SYNDROME ON LEFT: Status: RESOLVED | Noted: 2018-07-09 | Resolved: 2018-11-02

## 2018-11-02 PROCEDURE — 1101F PT FALLS ASSESS-DOCD LE1/YR: CPT | Mod: CPTII,S$GLB,, | Performed by: ORTHOPAEDIC SURGERY

## 2018-11-02 PROCEDURE — 3077F SYST BP >= 140 MM HG: CPT | Mod: CPTII,S$GLB,, | Performed by: ORTHOPAEDIC SURGERY

## 2018-11-02 PROCEDURE — 3077F SYST BP >= 140 MM HG: CPT | Mod: CPTII,S$GLB,, | Performed by: PHYSICAL MEDICINE & REHABILITATION

## 2018-11-02 PROCEDURE — 99214 OFFICE O/P EST MOD 30 MIN: CPT | Mod: 25,S$GLB,, | Performed by: PHYSICAL MEDICINE & REHABILITATION

## 2018-11-02 PROCEDURE — 3079F DIAST BP 80-89 MM HG: CPT | Mod: CPTII,S$GLB,, | Performed by: ORTHOPAEDIC SURGERY

## 2018-11-02 PROCEDURE — 3078F DIAST BP <80 MM HG: CPT | Mod: CPTII,S$GLB,, | Performed by: PHYSICAL MEDICINE & REHABILITATION

## 2018-11-02 PROCEDURE — 99214 OFFICE O/P EST MOD 30 MIN: CPT | Mod: PBBFAC,PO | Performed by: ORTHOPAEDIC SURGERY

## 2018-11-02 PROCEDURE — 99213 OFFICE O/P EST LOW 20 MIN: CPT | Mod: PBBFAC,27,PO | Performed by: PHYSICAL MEDICINE & REHABILITATION

## 2018-11-02 PROCEDURE — 99999 PR PBB SHADOW E&M-EST. PATIENT-LVL III: CPT | Mod: PBBFAC,,, | Performed by: PHYSICAL MEDICINE & REHABILITATION

## 2018-11-02 PROCEDURE — 99999 PR PBB SHADOW E&M-EST. PATIENT-LVL IV: CPT | Mod: PBBFAC,,, | Performed by: ORTHOPAEDIC SURGERY

## 2018-11-02 PROCEDURE — 99214 OFFICE O/P EST MOD 30 MIN: CPT | Mod: S$GLB,,, | Performed by: ORTHOPAEDIC SURGERY

## 2018-11-02 PROCEDURE — 20552 NJX 1/MLT TRIGGER POINT 1/2: CPT | Mod: S$GLB,,, | Performed by: PHYSICAL MEDICINE & REHABILITATION

## 2018-11-02 PROCEDURE — 1101F PT FALLS ASSESS-DOCD LE1/YR: CPT | Mod: CPTII,S$GLB,, | Performed by: PHYSICAL MEDICINE & REHABILITATION

## 2018-11-02 NOTE — PROGRESS NOTES
PM&R CLINIC NOTE   Chief Complaint   Patient presents with    Shoulder Pain     right        HPI: This is a 78 y.o.  male being seen in clinic today for right shoulder achy pain and tightness. He has seen orthopedics and IPM for his shoulder DJD and cervical radic issues.  He complains of tightness and achy pain near his shoulder blade.  Rest only provides minimal relief.     History obtained from patient    Functional History:  Walking: limited  Transfers: Independent  Assistive devices: No  Power mobility: No  Falls: None    Needs help with:  Nothing - all ADLS normal    Review of Systems:     General- denies lethargy, weight change, fever, chills  Head/neck- denies swallowing difficulties  ENT- denies hearing changes+tinnitus  Cardiovascular-denies chest pain  Pulmonary- denies shortness of breath  GI- denies constipation or bowel incontinence  - denies bladder incontinence  Skin- denies wounds or rashes  Musculoskeletal- denies weakness, +pain  Neurologic- denies numbness and tingling  Psychiatric- denies depressive or psychotic features, denies anxiety  Lymphatic-+swelling  Endocrine- denies hypoglycemic symptoms/DM history    Physical Examination:  General: Well developed, well nourished male, NAD, forward flexed posture-mild kyphosis  HEENT: NCAT EOMI  Pulmonary: Normal respirations    Spinal Examination: CERVICAL  Active ROM is within normal limits.  Inspection: No deformity of spinal alignment.  Tight and tender bands along right rhomboid with mild spams    Spinal Examination: LUMBAR or THORACIC  Active ROM is within normal limits.  Inspection: No deformity of spinal alignment.    Bilateral Upper and Lower Extremities:  Pulses are  2+radial  Shoulder/Elbow/Wrist/Hand ROM wnl, limited full rom at right shoulder  Hip/Knee/Ankle ROM wnl   Bilateral Extremities show normal capillary refill.  No signs of cyanosis, rubor, edema, skin changes, or dysvascular changes of appendages.  Nails appear  intact.    Neurological Exam:  Cranial Nerves:  II-XII grossly intact    Manual Muscle Testing: (Motor 5=normal)  5/5 strength bilateral upper exts  No focal atrophy is noted of either upper extremity.    Bilateral Reflexes:hypo at patellar  No clonus at knee or ankle.    Sensation: tested to light touch  - intact in legs    Gait: mildly antalgic, poor hip flexion-worse on right    Cerebellar:  tandem gait.     IMPRESSION/PLAN: This is a 78 y.o.  male with myofascial pain, cervical, shoulder, and lumbar DJD, cubital tunnel syndrome     1. Pt declines PT at this time, he agrees to HEP around his home. Handouts provided  2. Recommend ice compress 20 min but pt declines ice--states he only like heat  3. Tylenol arthritis prn, topical agents  4. Advised pt to discuss persistent swelling with PCP  5. Fu with IPM  6. Trigger pt injection today    Ashlie Gomez M.D.  Physical Medicine and Re[facility]    PROCEDURE NOTE    Diagnosis: Myofascial pain  Procedure: trigger point injections to right rhomboid     Risks and benefits of procedure explained to patient including risks of infection, bleeding, pain, or damage to surrounding tissues. All questions answered. Informed consent obtained prior to proceeding. Areas marked and prepped in sterile fashion. Using a 27g 1.25inch needle, a3 cc mixture of normal saline and 1% lidocaine was injected evenly into the above mentioned muscles. None to minimal bleeding noted. ER and post injection instructions given.    Ashlie Gomez M.D.

## 2018-11-02 NOTE — PATIENT INSTRUCTIONS
Myofascial Pain Syndrome: Fibrositis  Your pain is caused by a state of chronic muscle tension. This condition is called by various names: myofascial pain, fibrositis and trigger point pain. This can also be due to mechanical stress (such as working at a computer terminal for long periods; or work that requires repetitive motions of the arms or hands) or emotional stress (such as problems on the job or in your personal life). Sometimes there is no obvious cause. The pain can occur in the area of the muscle spasm or at a site distant to it. For example, spasm of a neck muscle can cause headache. Spasm of the muscle near the shoulder blade can cause pain shooting down the arm.  Home Care:  · Try to identify the factors that may be causing your problem and change them:  ¨ If you feel that emotional stress is a cause of your pain, learn methods to deal more effectively with the stress in your life. These may include regular exercise, muscle relaxation techniques, meditation or simply taking time out for yourself. Consult your doctor or go to a local bookstore and review the many books and tapes available on the subject of stress reduction.  ¨ If you feel that physical stress is a cause for your pain, try to modify any poor work habits.  · You may use acetaminophen (Tylenol) or ibuprofen (Motrin, Advil) to control pain, unless another medicine was prescribed. [NOTE: If you have chronic liver or kidney disease or ever had a stomach ulcer or GI bleeding, talk with your doctor before using these medicines.]  · The use of heat to the muscle (hot compress or heating pad) will be helpful to reduce muscle spasm. Some persons get relief with ice packs. Apply an ice pack (crushed or cubed ice in a plastic bag, wrapped in a towel) for 20 minutes at a time as needed. Use the method that feels best to you.  · Massaging the trigger point and stretching out the muscle are an important parts of prevention and treatment. Trigger point  massage can be done by first applying heat to the area to warm and prepare the muscle. Have someone apply steady thumb pressure directly on the knot in the muscle (the most tender point) for 30 seconds. Release the pressure, then massage the surrounding muscle. Repeat the process, applying more pressure to the trigger point each time. Do this up to the limit of pain. With each treatment, the trigger point should become less tender and the pain should decrease. You can apply local pressure to trigger points in the back by lying on the floor with a tennis ball under the trigger point.  Follow Up  with your doctor as advised or if not improving within the next week. It may be necessary for you to receive physical therapy if you do not respond to home treatment alone.  Get Prompt Medical Attention  if any of the following occur:  · If your trigger point is in the chest muscles, observe for pain that becomes more severe, lasts longer, or spreads into your shoulder/arm, neck or back; you develop trouble breathing, sweating, nausea or vomiting in association with chest pain  · If you develop weakness or numbness in an extremity  · If your pain worsens, regardless of its location  © 0968-8572 [employer]. [address] [place] [address]. All rights reserved. This information is not intended as a substitute for professional medical care. Always follow your healthcare professional's instructions.          Trigger Point Injection  The cause of your muscle pain or spasms may be one or more trigger points. Your health care provider may decide to inject the painful spots to relax the muscle. This can help relieve your pain. Relaxing the muscle can also make movement easier. You may then be able to exercise to strengthen the muscle and help it heal.    What is a trigger point?  A trigger point is a tight, painful knot of muscle fiber. It can form where a muscle is strained or injured. The knot can  sometimes be felt under the skin. A trigger point is very tender to the touch. Pain may also spread to other parts of the affected muscle. Muscles around a knee, shoulder blade, or other bones are prone to trigger points. This is because these muscles are more likely to be injured.    About the injections  Any muscle in the body can have one or more trigger points. Several injections may be needed in each trigger point to best relieve pain. These injections may be given in sessions about 2 weeks apart, depending on the preference of your health care provider. In some cases, you may not feel much change in your symptoms until after the third injection.     © 5206-6626 [employer]. [address] [place] [address]. All rights reserved. This information is not intended as a substitute for professional medical care. Always follow your healthcare professional's instructions.            Your Neck Muscles  The muscles in the neck and shoulders need to be strong to hold the neck and head in place. These muscles also help move the neck and shoulders. Your health care provider can recommend exercises to help stretch and strengthen your neck muscles.    © 7891-8618 [employer]. [address] [place] [address]. All rights reserved. This information is not intended as a substitute for professional medical care. Always follow your healthcare professional's instructions.          Neck Problems: Relieving Your Symptoms  The first goal of treatment is to relieve your symptoms. Your health care provider may recommend self-care treatments. These include resting, applying ice and heat, taking medication, and doing exercises. Your health care provider may also recommend that you see a physical therapist, who can teach you ways to care for and strengthen your neck.    Self-Care Treatments  Pain can end quickly or last awhile. Either way, youll want relief as soon as possible. Your health  care provider can tell you which treatments to do at home to help relieve your pain.  · Lying down for a short time takes pressure from the head off the neck.  · Ice and heat can help reduce pain. To bring down swelling, rest an ice pack wrapped in a thin towel on your neck for 15 minutes. To relax sore muscles, apply a warm, wet towel to the area. Or take a warm bath or shower.  · Over-the-counter medications, such as ibuprofen, naproxen, and aspirin, can help reduce pain and swelling. Acetaminophen can help relieve pain. Use these only as directed.  · Exercises can relax muscles and prevent stiffness. To prepare, drape a warm, wet towel around your neck and shoulders for 5 minutes. Remove the towel. Then do any exercises recommended to you by your health care provider.  Physical Therapy  If self-care treatments arent helping relieve neck pain, your health care provider may suggest one or more sessions of physical therapy. Physical therapy is performed by a specialist trained to treat injuries. Your physical therapist (PT) will teach you how to strengthen muscles, improve the spines alignment, and help you move properly. Treatment methods used in physical therapy may include:  · Heat. A special heating pad called a neck pack may be applied to your neck.  · Exercises. Your PT will teach you exercises to help strengthen your neck and improve its range of motion.  · Joint mobilization. The PT gently moves your vertebrae to help restore motion in your neck joints and reduce neck pain.  · Soft tissue mobilization. The PT massages and stretches the muscles in your neck and shoulders.  · Electrical stimulation. Electrical impulses are sent into your neck. This helps reduce soreness and inflammation.  · Education in body mechanics. The PT shows you ways to position and move your body that protect the neck.  Other Treatments  If physical therapy doesnt relieve your neck pain, your health care provider may suggest other  treatments. For example, medications or injections can help relieve pain and swelling. In some cases, surgery may be needed to treat neck problems.  © 7797-1307 [employer]. [address] [place] [address], [place] [address]. All rights reserved. This information is not intended as a substitute for professional medical care. Always follow your healthcare professional's instructions.          Understanding Neck Problems       If you suffer from neck pain, youre not alone. Many people have neck pain at some point in their lives. Problems such as poor posture, injury, and wear and tear can lead to neck pain. Your health care provider will work with you to find the treatment thats best for your neck.  Types of Neck Problems  The following problems can cause pain or injury in your neck:  · Strains and sprains: Strains (stretched or torn muscles) and sprains (stretched or torn ligaments) can cause neck pain. Strains and sprains can occur during an accident, or when you overuse your neck through repetitive motion. They can also cause your muscles and ligaments to become inflamed (swollen and painful).  · Whiplash and other injuries: Whiplash can result when an impact throws your head, forcing your neck too far forward (hyperflexion), then too far backward (hyperextension). When combined, the two motions can cause a painful injury to different parts of your neck, such as muscles, ligaments, or joints. The most common cause of whiplash is a car accident. But it can also happen during a fall or sports injury.  · Weakened disks: A simple action, such as a sneeze or a cough, can cause one of your disks to bulge (herniate). A herniated disk can put pressure on your nerve and cause pain. Over time, disks can also thin out (degenerate). Flattened disks dont cushion vertebrae well and can cause vertebrae to rub together. Rubbing vertebrae can pinch nerves and cause pain.  · Weakened joints: Aging and injury can cause joints  to slowly degenerate. Thinned joints can also cause vertebrae to rub together. This can cause abnormal growths of bone (bone spurs) to form on vertebrae. Bone spurs put pressure on nerves, causing pain.  Common Symptoms  If you have a neck problem, you may have one or more of the following symptoms:  · Muscle tension and spasm: You may not be able to move your neck, arms, or shoulders comfortably if you have muscle tension or stiffness in your neck. If your symptoms arent relieved, you may experience muscle spasms, or knots of contracted tissue (trigger points) in areas of your neck and shoulders.  · Aches and pains: Dull aches in your head or neck, sharp pains, and swelling of the soft tissue of your neck and shoulders are common symptoms. If theres pressure on the nerves in your neck, you may feel pain in your arms or hands (referred pain).  · Numbness or weakness: If you injure the nerves in your neck, you may experience numbness, tingling, or weakness in your shoulders, arms, or hands. These symptoms arise when disks or bone spurs press on the nerves in your neck.  © 2324-3378 [employer]. [address] [place] [address]. All rights reserved. This information is not intended as a substitute for professional medical care. Always follow your healthcare professional's instructions.          Neck Spasm [No Trauma]    Spasm of the neck muscles can occur after a sudden awkward neck movement. Sleeping with your neck in a crooked position can also cause spasm. Some persons respond to emotional stress by tensing the muscles of their neck, shoulders and upper back. If neck spasm lasts long enough, it can cause headache.  The treatment described below will usually help the pain to go away in 5-7 days. Pain that continues may require further evaluation or other types of treatment such as physical therapy.  Home Care:  1. Rest and relax the muscles. Use a comfortable pillow that supports the head  and keeps the spine in a neutral position. The position of the head should not be tilted forward or backward. A rolled up towel may help for a custom fit.  2. Some persons find relief with heat (hot shower, hot bath or heating pad) and massage, while others prefer cold packs (crushed or cubed ice in a plastic bag, wrapped in a towel). Try both and use the method that feels best for 20 minutes several times a day.  3. You may use acetaminophen (Tylenol) or ibuprofen (Motrin, Advil) to control pain, unless another medicine was prescribed. [NOTE: If you have chronic liver or kidney disease or ever had a stomach ulcer or GI bleeding, talk with your doctor before using these medicines.]  Follow Up  with your doctor or this facility if your symptoms do not show signs of improvement after one week. Physical therapy or further evaluation may be needed.  [NOTE: If x-rays were taken, they will be reviewed by a radiologist. You will be notified of any new findings that may affect your care.]  Return Promptly  or contact your doctor if any of the following occurs:  · Pain becomes worse or spreads into one or both arms  · Weakness or numbness in one or both arms  · Increasing headache with nausea or vomiting  · Fever over 100.4ºF (38.0ºC)  © 5742-9977 [employer]. [address] [place] [address], [place] [address]. All rights reserved. This information is not intended as a substitute for professional medical care. Always follow your healthcare professional's instructions.          Know Your Neck: The Cervical Spine  By learning about the parts of the neck, you can better understand your neck problem. The bones of the neck are called cervical vertebrae, commonly identified as C1 through C7. Together, they form a bony column called the spine. Vertebrae also protect the spinal cord, a pathway for messages to reach the brain. Surrounding the spine are soft tissues such as muscles, tendons, and nerves.        Flexibility Is  Key  For the neck to function normally, it has to be flexible enough to move without discomfort. A healthy neck can move easily in six different directions.    © 6307-1903 [employer]. [address] [place] [address], [place] [address]. All rights reserved. This information is not intended as a substitute for professional medical care. Always follow your healthcare professional's instructions.          Protecting Your Neck: Posture and Body Mechanics  Protecting your neck from injuries and pain involves practicing good posture and body mechanics. This may mean correcting bad habits you have related to the way you hold and move your body. The tips below can help you improve your posture and body mechanics.    What Is Posture and Why Does It Matter?  Posture is the way you hold your body. For many of us, this means hunching over, thrusting the chin forward, and slouching the shoulders. But this kind of poor posture keeps muscles from properly supporting the neck and puts stress on muscles, disks, ligaments, and joints in your neck. As a result, injury and pain can occur.  How Is Your Posture?  Use a full-length mirror to check your posture. To begin, stand normally. Then slowly back up against a wall. Is there space between your head and the wall? Do you slouch your shoulders? Is your chin pointing up or down? All these can cause neck pain and injury.  Improving Your Posture  Follow these steps to improve your posture:  · Pull your shoulders back.  · Think of the ears, shoulders, and hips as a series of dots. Now, adjust your body to connect the dots in a straight line.  · Keep your chin level.  What Are Body Mechanics and Why Do They Matter?  The way you move and position your body during daily activities is called body mechanics. Good body mechanics help protect the neck. This means learning the right ways to stand, sit, and even sleep. So do whats best for your neck and practice good body  mechanics.  Standing   To protect your neck while standing:  · Carry objects close to your body.  · Keep your ears and shoulders in a line while standing or walking.  · To lower yourself, bend at the knees with a straight back. Do this instead of looking down and reaching for objects.  · Work at eye level. Dont reach above your head or tilt your head back.  Sitting   To protect your neck while sitting:  · Set up your workstation so your monitor is at eye level. Also, use a document campos when viewing papers or books.  · Keep your knees at or slightly below the level of your hips.  · Sit up straight, with feet flat on the floor. If your feet dont touch the floor, use a footrest.  · Avoid sitting or driving for long periods. Take frequent breaks.  Sleeping   To protect your neck while sleeping:  · Sleep on your back with a pillow under your knees, or on your side with a pillow between bent knees. This helps align the spine.  · Avoid using pillows that are too high or too low. Instead, use a neck roll or pillow under your neck while you sleep to keep the neck straight.  · Sleep on a mattress that supports you, with a pillow under your neck.  © 2240-5100 [employer]. [address] [place] [address], [place] [address]. All rights reserved. This information is not intended as a substitute for professional medical care. Always follow your healthcare professional's instructions.          Exercises at Your Workstation: Eyes, Neck, and Head     Tired eyes? Stiff neck? A few easy moves can help prevent these kinds of problems. Take a few minutes during your day to do these exercises--right at your desk. They'll loosen up your muscles, keep you more alert, and make a big difference in how you work and feel.    For your eyes  Eye cup  · Lean forward with your elbows on your desk.  · Cup your hands and place them lightly over your closed eyes. Hold for a minute, while breathing deeply in and out.  · Slowly uncover and  open your eyes. Repeat 2 times.  Eye roll  · Close your eyes. Slowly roll your eyeballs clockwise all the way around. Repeat 3 times.  · Now slowly roll them all the way around counterclockwise. Repeat 3 times.  Eye rest  · Every 20 minutes, look away from the computer screen. Focus on an object at least 20 feet away. Stay focused on this object for a full 20 seconds.    For your neck and head  Warm-up  · Drop your head gently to your chest. While breathing in, slowly roll your head up to your left shoulder. While breathing out, slowly roll your head back to center. Repeat to the right.  · Repeat 3 times on each side.  Head tilt  · Sit up straight. Tuck in your chin.  · Slowly tip your head to the left. Return to the center. Then, tip your head to the right.  · Repeat 3 times on each side.    Head turn  · Sit up straight.  · Slowly turn your head and look over your left shoulder. Hold for a few seconds. Go back to the center, then repeat to your right.  · Repeat 3 times on each side.  © 9764-6059 [employer]. [address] [place], [place] [id number]. All rights reserved. This information is not intended as a substitute for professional medical care. Always follow your healthcare professional's instructions.          Reach and Hold Exercise    Do this exercise on your hands and knees. Keep your knees under your hips and your hands under your shoulders. Keep your spine in a neutral position (not arched or sagging). Keep your ears in line with your shoulders. Hold for a few seconds before starting the exercise:  4. Tighten your abdominal muscles and raise one arm straight in front of you, palm down. Hold for 5 seconds, then lower. Repeat 5 times.  5. Do the exercise again, this time lifting your arm to the side. Repeat 5 times.  6. Do the exercise again, this time lifting your arm backward, palm up. Repeat 5 times.  Switch sides and do each exercise with the other arm.  © 6531-1054 [employer],  [employer]. [address] [place] [address]. All rights reserved. This information is not intended as a substitute for professional medical care. Always follow your healthcare professional's instructions.        Shoulder and Upper Back Stretch  To start, stand tall with your ears, shoulders, and hips in line. Your feet should be slightly apart, positioned just under your hips. Focus your eyes directly in front of you.  this position for a few seconds before starting your exercise. This helps increase your awareness of proper posture.  Reach overhead and slightly back with both arms. Keep your shoulders and neck aligned and your elbows behind your shoulders:  · With your palms facing the ceiling, turn your fingers inward.  · Take a deep breath. Breathe out, and lower your elbows toward your buttocks. Hold for 5 seconds, then return to starting position.  · Repeat 3 times.    © 5197-1052 [employer]. [address] [place] [address]. All rights reserved. This information is not intended as a substitute for professional medical care. Always follow your healthcare professional's instructions.          Shoulder Clock Exercise  To start, stand tall with your ears, shoulders, and hips in line. Your feet should be slightly apart, positioned just under your hips. Focus your eyes directly in front of you.  this position for a few seconds before starting your exercise. This helps increase your awareness of proper posture.  · Imagine that your right shoulder is the center of a clock. With the outer point of your shoulder, roll it around to slowly trace the outer edge of the clock.  · Move clockwise first, then counterclockwise.  · Repeat 3 times. Switch shoulders.   © 2760-7887 [employer]. [address] [place] [address]. All rights reserved. This information is not intended as a substitute for professional medical care. Always follow your healthcare  professional's instructions.          Shoulder Girdle Stretch     To start, sit in a chair with your feet flat on the floor. Your weight should be slightly forward so that youre balanced evenly on your buttocks. Relax your shoulders and keep your head level. Using a chair with arms may help you keep your balance:  · Place 1 hand on the outside elbow of the other arm.  · Pull the arm across your body. Hold for 30 to 60 seconds. Repeat once.  · Switch sides.    © 5246-7648 [employer]. [address] [place] [address]. All rights reserved. This information is not intended as a substitute for professional medical care. Always follow your healthcare professional's instructions.          Shoulder Exercises      To start, sit in a chair with your feet flat on the floor. Your weight should be slightly forward so that youre balanced evenly on your buttocks. Relax your shoulders and keep your head level. Avoid arching your back or rounding your shoulders. Using a chair with arms may help you keep your balance.  · Raise your arms, elbows bent, to shoulder height.  · Slowly move your forearms together. Hold for 5 seconds.  · Return to starting position. Repeat 5 times.  © 1451-4772 [employer]. [address] [place] [address]. All rights reserved. This information is not intended as a substitute for professional medical care. Always follow your healthcare professional's instructions.        Shoulder Shrug Exercise  To start, sit in a chair with your feet flat on the floor. Shift your weight slightly forward to avoid rounding your back. Relax. Keep your ears, shoulders, and hips aligned:  · Raise both of your shoulders as high as you can, as if you were trying to touch them to your ears. Keep your head and neck still and relaxed.  · Hold for a count of 10. Release.  · Repeat 5 times.    © 6537-6968 [employer]. [address] [place] [address]. All rights  reserved. This information is not intended as a substitute for professional medical care. Always follow your healthcare professional's instructions.          Shoulder Squeeze Exercise     To start, sit in a chair with your feet flat on the floor. Shift your weight slightly forward to avoid rounding your back. Relax. Keep your ears, shoulders, and hips aligned:  · Raise your arms to shoulder height, elbows bent and palms forward.  · Move your arms back, squeezing your shoulder blades together.  · Hold for 10 seconds. Return to starting position.   · Repeat 5 times.     © 3839-4612 [employer]. [address] [place] [address]. All rights reserved. This information is not intended as a substitute for professional medical care. Always follow your healthcare professional's instructions.
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
## 2018-11-08 DIAGNOSIS — M06.09 RHEUMATOID ARTHRITIS OF MULTIPLE SITES WITH NEGATIVE RHEUMATOID FACTOR: ICD-10-CM

## 2018-11-08 DIAGNOSIS — M1A.09X1 IDIOPATHIC CHRONIC GOUT OF MULTIPLE SITES WITH TOPHUS: ICD-10-CM

## 2018-11-08 RX ORDER — TRAMADOL HYDROCHLORIDE 50 MG/1
TABLET ORAL
Qty: 30 TABLET | Refills: 0 | Status: SHIPPED | OUTPATIENT
Start: 2018-11-08 | End: 2019-03-07 | Stop reason: SDUPTHER

## 2018-11-18 DIAGNOSIS — M06.09 RHEUMATOID ARTHRITIS OF MULTIPLE SITES WITH NEGATIVE RHEUMATOID FACTOR: ICD-10-CM

## 2018-11-18 DIAGNOSIS — F51.04 CHRONIC INSOMNIA: ICD-10-CM

## 2018-11-18 RX ORDER — ZOLPIDEM TARTRATE 5 MG/1
TABLET ORAL
Qty: 90 TABLET | Refills: 0 | Status: SHIPPED | OUTPATIENT
Start: 2018-11-18 | End: 2019-02-10 | Stop reason: SDUPTHER

## 2018-11-18 RX ORDER — PREDNISONE 5 MG/1
TABLET ORAL
Qty: 90 TABLET | Refills: 1 | Status: SHIPPED | OUTPATIENT
Start: 2018-11-18 | End: 2019-05-29 | Stop reason: SDUPTHER

## 2018-11-20 ENCOUNTER — TELEPHONE (OUTPATIENT)
Dept: RHEUMATOLOGY | Facility: CLINIC | Age: 78
End: 2018-11-20

## 2018-11-20 ENCOUNTER — LAB VISIT (OUTPATIENT)
Dept: LAB | Facility: HOSPITAL | Age: 78
End: 2018-11-20
Attending: INTERNAL MEDICINE
Payer: MEDICARE

## 2018-11-20 DIAGNOSIS — M06.09 RHEUMATOID ARTHRITIS OF MULTIPLE SITES WITH NEGATIVE RHEUMATOID FACTOR: Primary | ICD-10-CM

## 2018-11-20 DIAGNOSIS — M06.09 RHEUMATOID ARTHRITIS OF MULTIPLE SITES WITH NEGATIVE RHEUMATOID FACTOR: ICD-10-CM

## 2018-11-20 LAB
ALBUMIN SERPL BCP-MCNC: 4.1 G/DL
ALP SERPL-CCNC: 77 U/L
ALT SERPL W/O P-5'-P-CCNC: 23 U/L
ANION GAP SERPL CALC-SCNC: 11 MMOL/L
AST SERPL-CCNC: 21 U/L
BASOPHILS # BLD AUTO: 0.03 K/UL
BASOPHILS NFR BLD: 0.3 %
BILIRUB SERPL-MCNC: 0.4 MG/DL
BUN SERPL-MCNC: 28 MG/DL
CALCIUM SERPL-MCNC: 10.2 MG/DL
CHLORIDE SERPL-SCNC: 105 MMOL/L
CO2 SERPL-SCNC: 26 MMOL/L
CREAT SERPL-MCNC: 1.5 MG/DL
CRP SERPL-MCNC: 7.7 MG/L
DIFFERENTIAL METHOD: ABNORMAL
EOSINOPHIL # BLD AUTO: 0.1 K/UL
EOSINOPHIL NFR BLD: 1.2 %
ERYTHROCYTE [DISTWIDTH] IN BLOOD BY AUTOMATED COUNT: 16.6 %
ERYTHROCYTE [SEDIMENTATION RATE] IN BLOOD BY WESTERGREN METHOD: 18 MM/HR
EST. GFR  (AFRICAN AMERICAN): 51.5 ML/MIN/1.73 M^2
EST. GFR  (NON AFRICAN AMERICAN): 44.6 ML/MIN/1.73 M^2
GLUCOSE SERPL-MCNC: 96 MG/DL
HCT VFR BLD AUTO: 43.2 %
HGB BLD-MCNC: 13.6 G/DL
LYMPHOCYTES # BLD AUTO: 0.8 K/UL
LYMPHOCYTES NFR BLD: 8.3 %
MCH RBC QN AUTO: 30 PG
MCHC RBC AUTO-ENTMCNC: 31.5 G/DL
MCV RBC AUTO: 95 FL
MONOCYTES # BLD AUTO: 0.7 K/UL
MONOCYTES NFR BLD: 7.6 %
NEUTROPHILS # BLD AUTO: 7.6 K/UL
NEUTROPHILS NFR BLD: 82.6 %
PLATELET # BLD AUTO: 326 K/UL
PMV BLD AUTO: 9.7 FL
POTASSIUM SERPL-SCNC: 4.2 MMOL/L
PROT SERPL-MCNC: 7.6 G/DL
RBC # BLD AUTO: 4.53 M/UL
SODIUM SERPL-SCNC: 142 MMOL/L
WBC # BLD AUTO: 9.21 K/UL

## 2018-11-20 PROCEDURE — 85651 RBC SED RATE NONAUTOMATED: CPT | Mod: HCNC,PO

## 2018-11-20 PROCEDURE — 80053 COMPREHEN METABOLIC PANEL: CPT | Mod: HCNC

## 2018-11-20 PROCEDURE — 86140 C-REACTIVE PROTEIN: CPT | Mod: HCNC

## 2018-11-20 PROCEDURE — 36415 COLL VENOUS BLD VENIPUNCTURE: CPT | Mod: HCNC,PO

## 2018-11-20 PROCEDURE — 85025 COMPLETE CBC W/AUTO DIFF WBC: CPT | Mod: HCNC,PO

## 2018-11-20 RX ORDER — METHOTREXATE 2.5 MG/1
TABLET ORAL
Qty: 30 TABLET | Refills: 2 | Status: SHIPPED | OUTPATIENT
Start: 2018-11-20 | End: 2019-06-27 | Stop reason: SDUPTHER

## 2018-11-20 NOTE — TELEPHONE ENCOUNTER
Would like a refill on MTX, Completed labs today and is scheduled for an appointment with Lilly Foley PA-C for 12.4.18. Please advise.

## 2018-11-21 NOTE — TELEPHONE ENCOUNTER
----- Message from Larry Zepeda MD sent at 11/20/2018 11:44 PM CST -----  Labs show stable results. Continue plan as advised during the visit.

## 2018-11-23 RX ORDER — METHOTREXATE 2.5 MG/1
TABLET ORAL
Qty: 30 TABLET | Refills: 2 | Status: SHIPPED | OUTPATIENT
Start: 2018-11-23 | End: 2019-06-27 | Stop reason: SDUPTHER

## 2018-12-03 NOTE — PROGRESS NOTES
"Subjective:       Patient ID: Pablito Givens Jr. is a 78 y.o. male.    Chief Complaint: Rheumatoid Arthritis    Caprice is a 79 y/o patient of Dr. Castro seen for chronic gout and seronegative RA. He is on mtx 15mg/week and prednisone 5mg/d for RA.  He takes uloric 40mg/d for chronic gout. He is very active and does lots of work with his hands, this doesn't help his pain.     He was last seen in feb and has had continued issues with right shoulder, elbow and wrist pain.  He had right shoulder and wrist injections.  He has since seen orthopedics for his elbow pain/swelling.  Sent for an emg that showed moderate demyelinating and axonal ulnar neuropathy (cubital tunnel syndrome).  Ortho rec'd PT which he declined. He was supposed to get an MRI but due to no meds for anxiety he didn't get this done.  He has developed a sig tremor in his right arm/hand, has seen dr. Askew with neuro.    Right now he is frustrated and his elbow is his main issue.  He denies pain or swelling in his hand.  Today his pain is 10/10 in his elbow.        Review of Systems   Constitutional: Negative for chills, fatigue, fever and unexpected weight change.   HENT: Negative for congestion, mouth sores and rhinorrhea.    Eyes: Negative for pain, discharge and redness.   Respiratory: Negative for cough and shortness of breath.    Cardiovascular: Negative for chest pain and leg swelling.   Gastrointestinal: Negative for abdominal pain, diarrhea, nausea and vomiting.   Endocrine: Negative for cold intolerance and heat intolerance.   Genitourinary: Negative for dysuria.   Musculoskeletal: Positive for arthralgias. Negative for joint swelling and myalgias.   Skin: Negative for rash.   Allergic/Immunologic: Negative for immunocompromised state.   Neurological: Positive for tremors and numbness. Negative for weakness and headaches.   Psychiatric/Behavioral: The patient is not nervous/anxious.          Objective:   BP (!) 147/81   Pulse 78   Ht 6' 1" (1.854 " m)   Wt 99 kg (218 lb 4.1 oz)   BMI 28.80 kg/m²      Physical Exam   Constitutional: He is oriented to person, place, and time and well-developed, well-nourished, and in no distress.   HENT:   Head: Normocephalic and atraumatic.   Eyes: Pupils are equal, round, and reactive to light. Right eye exhibits no discharge.   Neck: Normal range of motion.   Cardiovascular: Normal rate, regular rhythm and normal heart sounds.  Exam reveals no friction rub.    Pulmonary/Chest: Effort normal and breath sounds normal. No respiratory distress.   Abdominal: Soft. He exhibits no distension. There is no tenderness.   Lymphadenopathy:     He has no cervical adenopathy.   Neurological: He is alert and oriented to person, place, and time.   Skin: No rash noted. No erythema.     Psychiatric: Mood normal.   Musculoskeletal: Normal range of motion. He exhibits no edema or deformity.   Right 3 pip enlarged from old trauma  Otherwise no synovitis to wrists mcps pips     Right elbow swelling along the lateral epicondyle  Right hand active tremor    rod knees replaced, normal  rod ankles no effusion warmth or tenderness           Recent Results (from the past 672 hour(s))   CBC auto differential    Collection Time: 11/20/18  1:46 PM   Result Value Ref Range    WBC 9.21 3.90 - 12.70 K/uL    RBC 4.53 (L) 4.60 - 6.20 M/uL    Hemoglobin 13.6 (L) 14.0 - 18.0 g/dL    Hematocrit 43.2 40.0 - 54.0 %    MCV 95 82 - 98 fL    MCH 30.0 27.0 - 31.0 pg    MCHC 31.5 (L) 32.0 - 36.0 g/dL    RDW 16.6 (H) 11.5 - 14.5 %    Platelets 326 150 - 350 K/uL    MPV 9.7 9.2 - 12.9 fL    Gran # (ANC) 7.6 1.8 - 7.7 K/uL    Lymph # 0.8 (L) 1.0 - 4.8 K/uL    Mono # 0.7 0.3 - 1.0 K/uL    Eos # 0.1 0.0 - 0.5 K/uL    Baso # 0.03 0.00 - 0.20 K/uL    Gran% 82.6 (H) 38.0 - 73.0 %    Lymph% 8.3 (L) 18.0 - 48.0 %    Mono% 7.6 4.0 - 15.0 %    Eosinophil% 1.2 0.0 - 8.0 %    Basophil% 0.3 0.0 - 1.9 %    Differential Method Automated    Comprehensive metabolic panel    Collection  Time: 11/20/18  1:46 PM   Result Value Ref Range    Sodium 142 136 - 145 mmol/L    Potassium 4.2 3.5 - 5.1 mmol/L    Chloride 105 95 - 110 mmol/L    CO2 26 23 - 29 mmol/L    Glucose 96 70 - 110 mg/dL    BUN, Bld 28 (H) 8 - 23 mg/dL    Creatinine 1.5 (H) 0.5 - 1.4 mg/dL    Calcium 10.2 8.7 - 10.5 mg/dL    Total Protein 7.6 6.0 - 8.4 g/dL    Albumin 4.1 3.5 - 5.2 g/dL    Total Bilirubin 0.4 0.1 - 1.0 mg/dL    Alkaline Phosphatase 77 55 - 135 U/L    AST 21 10 - 40 U/L    ALT 23 10 - 44 U/L    Anion Gap 11 8 - 16 mmol/L    eGFR if African American 51.5 (A) >60 mL/min/1.73 m^2    eGFR if non  44.6 (A) >60 mL/min/1.73 m^2   C-reactive protein    Collection Time: 11/20/18  1:46 PM   Result Value Ref Range    CRP 7.7 0.0 - 8.2 mg/L   Sedimentation rate    Collection Time: 11/20/18  1:46 PM   Result Value Ref Range    Sed Rate 18 (H) 0 - 10 mm/Hr       Assessment:       1. Rheumatoid arthritis of multiple sites with negative rheumatoid factor    2. Idiopathic chronic gout of multiple sites with tophus    3. Long-term current use of high risk medication other than anticoagulant    4. Ulnar neuropathy at elbow of right upper extremity        Impression:    Right elbow cubital tunnel syndrome: main issue, has seen ortho several times, pain, numbness, tremor now developed    Seronegative RA:  Joints are stable prednisone 5, mtx 15mg/week, daily folic acid, no synovitis on exam    Chronic gout: not the issue today, stable on uloric    High risk med use: labs reviewed, no signs of toxicity       Plan:         Cont mtx 15mg/week, no higher due to kidney disease but I dont see any reason to increase, consider lowering dose in future if he can get his elbow issues under control   Cont prednisone 5mg/day for now  toradol shot for pain today 30mg IM once, discussed his lower kidney function and the need to drink lots of water today and next few days   No nsaids   He does need the MRI of his elbow but I think at this  point he needs to see an upper extremity specialist, will refer to dr. Humphreys [facility], his elbow issue is his main issue and starting to affect his quality of life.     Will have him back for routine visit in 4 mos back with dr. ALFONSO

## 2018-12-04 ENCOUNTER — OFFICE VISIT (OUTPATIENT)
Dept: RHEUMATOLOGY | Facility: CLINIC | Age: 78
End: 2018-12-04
Payer: MEDICARE

## 2018-12-04 VITALS
HEART RATE: 78 BPM | SYSTOLIC BLOOD PRESSURE: 147 MMHG | DIASTOLIC BLOOD PRESSURE: 81 MMHG | BODY MASS INDEX: 28.93 KG/M2 | WEIGHT: 218.25 LBS | HEIGHT: 73 IN

## 2018-12-04 DIAGNOSIS — G56.21 ULNAR NEUROPATHY AT ELBOW OF RIGHT UPPER EXTREMITY: ICD-10-CM

## 2018-12-04 DIAGNOSIS — Z79.899 LONG-TERM CURRENT USE OF HIGH RISK MEDICATION OTHER THAN ANTICOAGULANT: ICD-10-CM

## 2018-12-04 DIAGNOSIS — M1A.09X1 IDIOPATHIC CHRONIC GOUT OF MULTIPLE SITES WITH TOPHUS: ICD-10-CM

## 2018-12-04 DIAGNOSIS — M06.09 RHEUMATOID ARTHRITIS OF MULTIPLE SITES WITH NEGATIVE RHEUMATOID FACTOR: Primary | ICD-10-CM

## 2018-12-04 PROCEDURE — 96372 THER/PROPH/DIAG INJ SC/IM: CPT | Mod: HCNC,S$GLB,, | Performed by: PHYSICIAN ASSISTANT

## 2018-12-04 PROCEDURE — 99999 PR PBB SHADOW E&M-EST. PATIENT-LVL V: CPT | Mod: PBBFAC,HCNC,, | Performed by: PHYSICIAN ASSISTANT

## 2018-12-04 PROCEDURE — 1101F PT FALLS ASSESS-DOCD LE1/YR: CPT | Mod: CPTII,HCNC,S$GLB, | Performed by: PHYSICIAN ASSISTANT

## 2018-12-04 PROCEDURE — 99214 OFFICE O/P EST MOD 30 MIN: CPT | Mod: HCNC,25,S$GLB, | Performed by: PHYSICIAN ASSISTANT

## 2018-12-04 PROCEDURE — 3077F SYST BP >= 140 MM HG: CPT | Mod: CPTII,HCNC,S$GLB, | Performed by: PHYSICIAN ASSISTANT

## 2018-12-04 PROCEDURE — 3079F DIAST BP 80-89 MM HG: CPT | Mod: CPTII,HCNC,S$GLB, | Performed by: PHYSICIAN ASSISTANT

## 2018-12-04 RX ORDER — KETOROLAC TROMETHAMINE 30 MG/ML
30 INJECTION, SOLUTION INTRAMUSCULAR; INTRAVENOUS
Status: COMPLETED | OUTPATIENT
Start: 2018-12-04 | End: 2018-12-04

## 2018-12-04 RX ADMIN — KETOROLAC TROMETHAMINE 30 MG: 30 INJECTION, SOLUTION INTRAMUSCULAR; INTRAVENOUS at 11:12

## 2018-12-04 ASSESSMENT — ROUTINE ASSESSMENT OF PATIENT INDEX DATA (RAPID3): MDHAQ FUNCTION SCORE: .6

## 2018-12-04 NOTE — PATIENT INSTRUCTIONS
toradol shot today, drink lots of water     Send you to upper extremity specialist Dr. [facility]    No change to methotrexate or prednisone

## 2018-12-04 NOTE — PROGRESS NOTES
Administered 1cc Betamethasone 6mg/cc to  RUOQ GluteaL. Pt. Tolerated well. No acute reaction noted to site. Pt. Instructed on S/S to report. Pt. Verbalized understanding.    Lot: -[id number]  Exp: 12/01/19

## 2018-12-05 ENCOUNTER — TELEPHONE (OUTPATIENT)
Dept: RHEUMATOLOGY | Facility: CLINIC | Age: 78
End: 2018-12-05

## 2018-12-05 NOTE — TELEPHONE ENCOUNTER
Returned pt call told pt we did not call him it may have been another provider from [facility] but he did tell me to add his home phone number to the list which I did pt verbalized understanding.

## 2018-12-05 NOTE — TELEPHONE ENCOUNTER
----- Message from Amina Musa sent at 12/5/2018 11:00 AM CST -----  returned call...[phone] (home)

## 2018-12-31 ENCOUNTER — LAB VISIT (OUTPATIENT)
Dept: LAB | Facility: HOSPITAL | Age: 78
End: 2018-12-31
Attending: ORTHOPAEDIC SURGERY
Payer: MEDICARE

## 2018-12-31 DIAGNOSIS — Z13.220 SCREENING FOR LIPOID DISORDERS: ICD-10-CM

## 2018-12-31 DIAGNOSIS — C72.0 MALIGNANT NEOPLASM OF SPINAL CORD: Primary | ICD-10-CM

## 2018-12-31 DIAGNOSIS — Z12.5 SPECIAL SCREENING FOR MALIGNANT NEOPLASM OF PROSTATE: ICD-10-CM

## 2018-12-31 LAB
AFP SERPL-MCNC: 2.2 NG/ML
ALBUMIN SERPL BCP-MCNC: 4.2 G/DL
ALP SERPL-CCNC: 73 U/L
ALT SERPL W/O P-5'-P-CCNC: 19 U/L
ANION GAP SERPL CALC-SCNC: 11 MMOL/L
AST SERPL-CCNC: 17 U/L
BASOPHILS # BLD AUTO: 0.07 K/UL
BASOPHILS NFR BLD: 0.8 %
BILIRUB SERPL-MCNC: 0.7 MG/DL
BILIRUB UR QL STRIP: NEGATIVE
BUN SERPL-MCNC: 33 MG/DL
CALCIUM SERPL-MCNC: 10.6 MG/DL
CEA SERPL-MCNC: 1.4 NG/ML
CHLORIDE SERPL-SCNC: 102 MMOL/L
CHOLEST SERPL-MCNC: 160 MG/DL
CHOLEST/HDLC SERPL: 3.5 {RATIO}
CLARITY UR: CLEAR
CO2 SERPL-SCNC: 28 MMOL/L
COLOR UR: YELLOW
COMPLEXED PSA SERPL-MCNC: 5.1 NG/ML
CREAT SERPL-MCNC: 1.7 MG/DL
CRP SERPL-MCNC: 10.4 MG/L
DIFFERENTIAL METHOD: ABNORMAL
EOSINOPHIL # BLD AUTO: 0.3 K/UL
EOSINOPHIL NFR BLD: 3 %
ERYTHROCYTE [DISTWIDTH] IN BLOOD BY AUTOMATED COUNT: 15.4 %
ERYTHROCYTE [SEDIMENTATION RATE] IN BLOOD BY WESTERGREN METHOD: 22 MM/HR
EST. GFR  (AFRICAN AMERICAN): 44.3 ML/MIN/1.73 M^2
EST. GFR  (NON AFRICAN AMERICAN): 38.3 ML/MIN/1.73 M^2
GLUCOSE SERPL-MCNC: 74 MG/DL
GLUCOSE UR QL STRIP: NEGATIVE
HCT VFR BLD AUTO: 45.1 %
HDLC SERPL-MCNC: 46 MG/DL
HDLC SERPL: 28.8 %
HGB BLD-MCNC: 13.8 G/DL
HGB UR QL STRIP: NEGATIVE
IMM GRANULOCYTES # BLD AUTO: 0.03 K/UL
IMM GRANULOCYTES NFR BLD AUTO: 0.3 %
KETONES UR QL STRIP: NEGATIVE
LDH SERPL L TO P-CCNC: 182 U/L
LDLC SERPL CALC-MCNC: 93.8 MG/DL
LEUKOCYTE ESTERASE UR QL STRIP: NEGATIVE
LYMPHOCYTES # BLD AUTO: 1.2 K/UL
LYMPHOCYTES NFR BLD: 13.5 %
MCH RBC QN AUTO: 29.7 PG
MCHC RBC AUTO-ENTMCNC: 30.6 G/DL
MCV RBC AUTO: 97 FL
MONOCYTES # BLD AUTO: 0.8 K/UL
MONOCYTES NFR BLD: 8.8 %
NEUTROPHILS # BLD AUTO: 6.4 K/UL
NEUTROPHILS NFR BLD: 73.6 %
NITRITE UR QL STRIP: NEGATIVE
NONHDLC SERPL-MCNC: 114 MG/DL
NRBC BLD-RTO: 0 /100 WBC
PH UR STRIP: 6 [PH] (ref 5–8)
PLATELET # BLD AUTO: 361 K/UL
PMV BLD AUTO: 10.7 FL
POTASSIUM SERPL-SCNC: 3.9 MMOL/L
PROT 24H UR-MRATE: NORMAL MG/SPEC
PROT SERPL-MCNC: 7.7 G/DL
PROT UR QL STRIP: NEGATIVE
PROT UR-MCNC: 8 MG/DL
RBC # BLD AUTO: 4.64 M/UL
SODIUM SERPL-SCNC: 141 MMOL/L
SP GR UR STRIP: 1.02 (ref 1–1.03)
TRIGL SERPL-MCNC: 101 MG/DL
URATE SERPL-MCNC: 7 MG/DL
URINE COLLECTION DURATION: NORMAL HR
URINE VOLUME: NORMAL ML
URN SPEC COLLECT METH UR: NORMAL
WBC # BLD AUTO: 8.74 K/UL

## 2018-12-31 PROCEDURE — 86334 IMMUNOFIX E-PHORESIS SERUM: CPT | Mod: HCNC

## 2018-12-31 PROCEDURE — 84156 ASSAY OF PROTEIN URINE: CPT | Mod: HCNC

## 2018-12-31 PROCEDURE — 82378 CARCINOEMBRYONIC ANTIGEN: CPT | Mod: HCNC

## 2018-12-31 PROCEDURE — 86334 IMMUNOFIX E-PHORESIS SERUM: CPT | Mod: 26,HCNC,, | Performed by: PATHOLOGY

## 2018-12-31 PROCEDURE — 80061 LIPID PANEL: CPT | Mod: HCNC

## 2018-12-31 PROCEDURE — 84165 PROTEIN E-PHORESIS SERUM: CPT | Mod: HCNC

## 2018-12-31 PROCEDURE — 84165 PATHOLOGIST INTERPRETATION SPE: ICD-10-PCS | Mod: 26,HCNC,, | Performed by: PATHOLOGY

## 2018-12-31 PROCEDURE — 80053 COMPREHEN METABOLIC PANEL: CPT | Mod: HCNC

## 2018-12-31 PROCEDURE — 84153 ASSAY OF PSA TOTAL: CPT | Mod: HCNC

## 2018-12-31 PROCEDURE — 81002 URINALYSIS NONAUTO W/O SCOPE: CPT | Mod: HCNC,PO

## 2018-12-31 PROCEDURE — 82105 ALPHA-FETOPROTEIN SERUM: CPT | Mod: HCNC

## 2018-12-31 PROCEDURE — 84550 ASSAY OF BLOOD/URIC ACID: CPT | Mod: HCNC

## 2018-12-31 PROCEDURE — 36415 COLL VENOUS BLD VENIPUNCTURE: CPT | Mod: HCNC,PO

## 2018-12-31 PROCEDURE — 86334 PATHOLOGIST INTERPRETATION IFE: ICD-10-PCS | Mod: 26,HCNC,, | Performed by: PATHOLOGY

## 2018-12-31 PROCEDURE — 83615 LACTATE (LD) (LDH) ENZYME: CPT | Mod: HCNC

## 2018-12-31 PROCEDURE — 85651 RBC SED RATE NONAUTOMATED: CPT | Mod: HCNC,PO

## 2018-12-31 PROCEDURE — 84166 PROTEIN E-PHORESIS/URINE/CSF: CPT | Mod: HCNC

## 2018-12-31 PROCEDURE — 85025 COMPLETE CBC W/AUTO DIFF WBC: CPT | Mod: HCNC

## 2018-12-31 PROCEDURE — 86140 C-REACTIVE PROTEIN: CPT | Mod: HCNC

## 2018-12-31 PROCEDURE — 84166 PROTEIN E-PHORESIS/URINE/CSF: CPT | Mod: 26,HCNC,, | Performed by: PATHOLOGY

## 2018-12-31 PROCEDURE — 84165 PROTEIN E-PHORESIS SERUM: CPT | Mod: 26,HCNC,, | Performed by: PATHOLOGY

## 2018-12-31 PROCEDURE — 84166 PATHOLOGIST INTERPRETATION UPE: ICD-10-PCS | Mod: 26,HCNC,, | Performed by: PATHOLOGY

## 2019-01-02 LAB
ALBUMIN SERPL ELPH-MCNC: 4.29 G/DL
ALPHA1 GLOB SERPL ELPH-MCNC: 0.4 G/DL
ALPHA2 GLOB SERPL ELPH-MCNC: 1.01 G/DL
B-GLOBULIN SERPL ELPH-MCNC: 0.66 G/DL
GAMMA GLOB SERPL ELPH-MCNC: 0.75 G/DL
PATHOLOGIST INTERPRETATION SPE: NORMAL
PROT SERPL-MCNC: 7.1 G/DL

## 2019-01-03 LAB
INTERPRETATION SERPL IFE-IMP: NORMAL
PATHOLOGIST INTERPRETATION IFE: NORMAL
PATHOLOGIST INTERPRETATION UPE: NORMAL
PROT PATTERN UR ELPH-IMP: NORMAL

## 2019-01-06 DIAGNOSIS — M06.09 RHEUMATOID ARTHRITIS OF MULTIPLE SITES WITH NEGATIVE RHEUMATOID FACTOR: ICD-10-CM

## 2019-01-06 DIAGNOSIS — N18.30 TYPE 2 DIABETES MELLITUS WITH STAGE 3 CHRONIC KIDNEY DISEASE, WITHOUT LONG-TERM CURRENT USE OF INSULIN: Chronic | ICD-10-CM

## 2019-01-06 DIAGNOSIS — E11.22 TYPE 2 DIABETES MELLITUS WITH STAGE 3 CHRONIC KIDNEY DISEASE, WITHOUT LONG-TERM CURRENT USE OF INSULIN: Chronic | ICD-10-CM

## 2019-01-06 RX ORDER — GLIMEPIRIDE 1 MG/1
TABLET ORAL
Qty: 90 TABLET | Refills: 0 | Status: SHIPPED | OUTPATIENT
Start: 2019-01-06

## 2019-01-07 RX ORDER — FOLIC ACID 1 MG/1
TABLET ORAL
Qty: 30 TABLET | Refills: 3 | Status: SHIPPED | OUTPATIENT
Start: 2019-01-07 | End: 2019-05-08 | Stop reason: SDUPTHER

## 2019-02-04 ENCOUNTER — PATIENT MESSAGE (OUTPATIENT)
Dept: RHEUMATOLOGY | Facility: CLINIC | Age: 79
End: 2019-02-04

## 2019-02-04 DIAGNOSIS — M1A.09X1 IDIOPATHIC CHRONIC GOUT OF MULTIPLE SITES WITH TOPHUS: ICD-10-CM

## 2019-02-04 RX ORDER — FEBUXOSTAT 40 MG/1
40 TABLET, FILM COATED ORAL DAILY
Qty: 14 TABLET | Refills: 0 | Status: SHIPPED | OUTPATIENT
Start: 2019-02-04

## 2019-02-05 ENCOUNTER — LAB VISIT (OUTPATIENT)
Dept: LAB | Facility: HOSPITAL | Age: 79
End: 2019-02-05
Attending: INTERNAL MEDICINE
Payer: MEDICARE

## 2019-02-05 DIAGNOSIS — R77.8 ELEVATED TOTAL PROTEIN: Primary | ICD-10-CM

## 2019-02-05 LAB
IGA SERPL-MCNC: 69 MG/DL
IGG SERPL-MCNC: 840 MG/DL
IGM SERPL-MCNC: 5 MG/DL
PROT UR-MCNC: 14 MG/DL

## 2019-02-05 PROCEDURE — 86335 IMMUNFIX E-PHORSIS/URINE/CSF: CPT | Mod: 26,HCNC,, | Performed by: PATHOLOGY

## 2019-02-05 PROCEDURE — 83520 IMMUNOASSAY QUANT NOS NONAB: CPT | Mod: 59,HCNC

## 2019-02-05 PROCEDURE — 84165 PROTEIN E-PHORESIS SERUM: CPT | Mod: HCNC

## 2019-02-05 PROCEDURE — 84156 ASSAY OF PROTEIN URINE: CPT | Mod: HCNC

## 2019-02-05 PROCEDURE — 84165 PROTEIN E-PHORESIS SERUM: CPT | Mod: 26,HCNC,, | Performed by: PATHOLOGY

## 2019-02-05 PROCEDURE — 86335 IMMUNFIX E-PHORSIS/URINE/CSF: CPT | Mod: HCNC

## 2019-02-05 PROCEDURE — 82784 ASSAY IGA/IGD/IGG/IGM EACH: CPT | Mod: 59,HCNC

## 2019-02-05 PROCEDURE — 36415 COLL VENOUS BLD VENIPUNCTURE: CPT | Mod: HCNC,PO

## 2019-02-05 PROCEDURE — 86335 PATHOLOGIST INTERPRETATION UIFE: ICD-10-PCS | Mod: 26,HCNC,, | Performed by: PATHOLOGY

## 2019-02-05 PROCEDURE — 84165 PATHOLOGIST INTERPRETATION SPE: ICD-10-PCS | Mod: 26,HCNC,, | Performed by: PATHOLOGY

## 2019-02-06 LAB
ALBUMIN SERPL ELPH-MCNC: 4.33 G/DL
ALPHA1 GLOB SERPL ELPH-MCNC: 0.43 G/DL
ALPHA2 GLOB SERPL ELPH-MCNC: 1 G/DL
B-GLOBULIN SERPL ELPH-MCNC: 0.66 G/DL
GAMMA GLOB SERPL ELPH-MCNC: 0.78 G/DL
INTERPRETATION UR IFE-IMP: NORMAL
KAPPA LC SER QL IA: 2.34 MG/DL
KAPPA LC/LAMBDA SER IA: 1.83
LAMBDA LC SER QL IA: 1.28 MG/DL
PROT SERPL-MCNC: 7.2 G/DL

## 2019-02-07 LAB
PATHOLOGIST INTERPRETATION SPE: NORMAL
PATHOLOGIST INTERPRETATION UIFE: NORMAL

## 2019-02-08 ENCOUNTER — TELEPHONE (OUTPATIENT)
Dept: RHEUMATOLOGY | Facility: CLINIC | Age: 79
End: 2019-02-08

## 2019-02-08 NOTE — TELEPHONE ENCOUNTER
----- Message from Opal Zbaala LPN sent at 2/7/2019  2:18 PM CST -----  Contact: Mayda/wife [phone]  Rob I spoke to Ms Ohara, Mr Kenney's wife she is requesting you to call her regarding a application for uloric. She said you normally have all the information saved for them. The pharmacy [facility] just waiting on Mr Kenney application, please return a call to Ms Ohara.   Thanks   ----- Message -----  From: Bushra Conroy  Sent: 2/7/2019   2:05 PM  To: Duane Juarez Staff    States that a new application for Uloric 40mg needs to be sent to     [facility] [place] [address]  [address]  [facility] [address]  Phone: [phone] Fax: [phone]    Please call back at [phone]//thank you acc

## 2019-02-10 DIAGNOSIS — F51.04 CHRONIC INSOMNIA: ICD-10-CM

## 2019-02-10 RX ORDER — ZOLPIDEM TARTRATE 5 MG/1
TABLET ORAL
Qty: 90 TABLET | Refills: 0 | Status: SHIPPED | OUTPATIENT
Start: 2019-02-10

## 2019-02-13 ENCOUNTER — TELEPHONE (OUTPATIENT)
Dept: RHEUMATOLOGY | Facility: CLINIC | Age: 79
End: 2019-02-13

## 2019-02-13 NOTE — TELEPHONE ENCOUNTER
----- Message from Carly Sanchez sent at 2/13/2019 12:27 PM CST -----  Contact: patients wife,  Type:  Needs Medical Advice    Who Called:Mayda  Symptoms (please be specific):   How long has patient had these symptoms:    Pharmacy name and phone #:    Would the patient rather a call back or a response via [facility]? call  Best Call Back Number: [phone]  Additional Information: Ms Ohara sent a fax to Rob and needs to know if she received it.

## 2019-02-13 NOTE — TELEPHONE ENCOUNTER
Returned pt wife call pt wife has been tryingtot fax paperwork over for Mr. Kenney but it hasnt been going through we have not received it  Pt wife asked if she could email it so I gave sky [place] email address and she verbalized understanding and will email it to Rob.

## 2019-02-14 ENCOUNTER — DOCUMENTATION ONLY (OUTPATIENT)
Dept: RHEUMATOLOGY | Facility: CLINIC | Age: 79
End: 2019-02-14

## 2019-02-14 NOTE — PROGRESS NOTES
Uloric approved until 12.31.19 through [facility] Patient assistance.

## 2019-03-07 DIAGNOSIS — M1A.09X1 IDIOPATHIC CHRONIC GOUT OF MULTIPLE SITES WITH TOPHUS: ICD-10-CM

## 2019-03-07 DIAGNOSIS — M06.09 RHEUMATOID ARTHRITIS OF MULTIPLE SITES WITH NEGATIVE RHEUMATOID FACTOR: ICD-10-CM

## 2019-03-07 RX ORDER — TRAMADOL HYDROCHLORIDE 50 MG/1
50 TABLET ORAL
Qty: 30 TABLET | Refills: 0 | Status: SHIPPED | OUTPATIENT
Start: 2019-03-07 | End: 2019-05-01 | Stop reason: SDUPTHER

## 2019-05-01 ENCOUNTER — LAB VISIT (OUTPATIENT)
Dept: LAB | Facility: HOSPITAL | Age: 79
End: 2019-05-01
Attending: INTERNAL MEDICINE
Payer: MEDICARE

## 2019-05-01 ENCOUNTER — OFFICE VISIT (OUTPATIENT)
Dept: RHEUMATOLOGY | Facility: CLINIC | Age: 79
End: 2019-05-01
Payer: MEDICARE

## 2019-05-01 VITALS
BODY MASS INDEX: 26.56 KG/M2 | HEIGHT: 73 IN | WEIGHT: 200.38 LBS | DIASTOLIC BLOOD PRESSURE: 55 MMHG | SYSTOLIC BLOOD PRESSURE: 90 MMHG | HEART RATE: 96 BPM

## 2019-05-01 DIAGNOSIS — M06.09 RHEUMATOID ARTHRITIS OF MULTIPLE SITES WITH NEGATIVE RHEUMATOID FACTOR: ICD-10-CM

## 2019-05-01 DIAGNOSIS — M06.09 RHEUMATOID ARTHRITIS OF MULTIPLE SITES WITH NEGATIVE RHEUMATOID FACTOR: Primary | ICD-10-CM

## 2019-05-01 DIAGNOSIS — M48.9 MASS OF THORACIC VERTEBRA: ICD-10-CM

## 2019-05-01 DIAGNOSIS — Z79.899 LONG-TERM CURRENT USE OF HIGH RISK MEDICATION OTHER THAN ANTICOAGULANT: ICD-10-CM

## 2019-05-01 DIAGNOSIS — M1A.09X1 IDIOPATHIC CHRONIC GOUT OF MULTIPLE SITES WITH TOPHUS: ICD-10-CM

## 2019-05-01 LAB
ALBUMIN SERPL BCP-MCNC: 3.9 G/DL (ref 3.5–5.2)
ALP SERPL-CCNC: 66 U/L (ref 55–135)
ALT SERPL W/O P-5'-P-CCNC: <5 U/L (ref 10–44)
ANION GAP SERPL CALC-SCNC: 10 MMOL/L (ref 8–16)
AST SERPL-CCNC: 10 U/L (ref 10–40)
BASOPHILS # BLD AUTO: 0.03 K/UL (ref 0–0.2)
BASOPHILS NFR BLD: 0.4 % (ref 0–1.9)
BILIRUB SERPL-MCNC: 0.5 MG/DL (ref 0.1–1)
BUN SERPL-MCNC: 37 MG/DL (ref 8–23)
CALCIUM SERPL-MCNC: 10.4 MG/DL (ref 8.7–10.5)
CHLORIDE SERPL-SCNC: 104 MMOL/L (ref 95–110)
CO2 SERPL-SCNC: 25 MMOL/L (ref 23–29)
CREAT SERPL-MCNC: 1.7 MG/DL (ref 0.5–1.4)
CRP SERPL-MCNC: 11.7 MG/L (ref 0–8.2)
DIFFERENTIAL METHOD: ABNORMAL
EOSINOPHIL # BLD AUTO: 0.2 K/UL (ref 0–0.5)
EOSINOPHIL NFR BLD: 2 % (ref 0–8)
ERYTHROCYTE [DISTWIDTH] IN BLOOD BY AUTOMATED COUNT: 15.9 % (ref 11.5–14.5)
ERYTHROCYTE [SEDIMENTATION RATE] IN BLOOD BY WESTERGREN METHOD: 16 MM/HR (ref 0–23)
EST. GFR  (AFRICAN AMERICAN): 44 ML/MIN/1.73 M^2
EST. GFR  (NON AFRICAN AMERICAN): 38 ML/MIN/1.73 M^2
GLUCOSE SERPL-MCNC: 134 MG/DL (ref 70–110)
HCT VFR BLD AUTO: 41 % (ref 40–54)
HGB BLD-MCNC: 13 G/DL (ref 14–18)
IMM GRANULOCYTES # BLD AUTO: 0.03 K/UL (ref 0–0.04)
IMM GRANULOCYTES NFR BLD AUTO: 0.4 % (ref 0–0.5)
LYMPHOCYTES # BLD AUTO: 0.7 K/UL (ref 1–4.8)
LYMPHOCYTES NFR BLD: 8.3 % (ref 18–48)
MCH RBC QN AUTO: 30.2 PG (ref 27–31)
MCHC RBC AUTO-ENTMCNC: 31.7 G/DL (ref 32–36)
MCV RBC AUTO: 95 FL (ref 82–98)
MONOCYTES # BLD AUTO: 0.9 K/UL (ref 0.3–1)
MONOCYTES NFR BLD: 10.4 % (ref 4–15)
NEUTROPHILS # BLD AUTO: 6.7 K/UL (ref 1.8–7.7)
NEUTROPHILS NFR BLD: 78.9 % (ref 38–73)
NRBC BLD-RTO: 0 /100 WBC
PLATELET # BLD AUTO: 328 K/UL (ref 150–350)
PMV BLD AUTO: 9.3 FL (ref 9.2–12.9)
POTASSIUM SERPL-SCNC: 3.9 MMOL/L (ref 3.5–5.1)
PROT SERPL-MCNC: 7.1 G/DL (ref 6–8.4)
RBC # BLD AUTO: 4.3 M/UL (ref 4.6–6.2)
SODIUM SERPL-SCNC: 139 MMOL/L (ref 136–145)
WBC # BLD AUTO: 8.52 K/UL (ref 3.9–12.7)

## 2019-05-01 PROCEDURE — 3074F SYST BP LT 130 MM HG: CPT | Mod: HCNC,CPTII,S$GLB, | Performed by: INTERNAL MEDICINE

## 2019-05-01 PROCEDURE — 3078F DIAST BP <80 MM HG: CPT | Mod: HCNC,CPTII,S$GLB, | Performed by: INTERNAL MEDICINE

## 2019-05-01 PROCEDURE — 3074F PR MOST RECENT SYSTOLIC BLOOD PRESSURE < 130 MM HG: ICD-10-PCS | Mod: HCNC,CPTII,S$GLB, | Performed by: INTERNAL MEDICINE

## 2019-05-01 PROCEDURE — 1101F PT FALLS ASSESS-DOCD LE1/YR: CPT | Mod: HCNC,CPTII,S$GLB, | Performed by: INTERNAL MEDICINE

## 2019-05-01 PROCEDURE — 99214 PR OFFICE/OUTPT VISIT, EST, LEVL IV, 30-39 MIN: ICD-10-PCS | Mod: HCNC,S$GLB,, | Performed by: INTERNAL MEDICINE

## 2019-05-01 PROCEDURE — 3078F PR MOST RECENT DIASTOLIC BLOOD PRESSURE < 80 MM HG: ICD-10-PCS | Mod: HCNC,CPTII,S$GLB, | Performed by: INTERNAL MEDICINE

## 2019-05-01 PROCEDURE — 99999 PR PBB SHADOW E&M-EST. PATIENT-LVL III: CPT | Mod: PBBFAC,HCNC,, | Performed by: INTERNAL MEDICINE

## 2019-05-01 PROCEDURE — 86140 C-REACTIVE PROTEIN: CPT | Mod: HCNC

## 2019-05-01 PROCEDURE — 1101F PR PT FALLS ASSESS DOC 0-1 FALLS W/OUT INJ PAST YR: ICD-10-PCS | Mod: HCNC,CPTII,S$GLB, | Performed by: INTERNAL MEDICINE

## 2019-05-01 PROCEDURE — 99499 RISK ADDL DX/OHS AUDIT: ICD-10-PCS | Mod: HCNC,S$GLB,, | Performed by: INTERNAL MEDICINE

## 2019-05-01 PROCEDURE — 99499 UNLISTED E&M SERVICE: CPT | Mod: HCNC,S$GLB,, | Performed by: INTERNAL MEDICINE

## 2019-05-01 PROCEDURE — 99214 OFFICE O/P EST MOD 30 MIN: CPT | Mod: HCNC,S$GLB,, | Performed by: INTERNAL MEDICINE

## 2019-05-01 PROCEDURE — 85652 RBC SED RATE AUTOMATED: CPT | Mod: HCNC

## 2019-05-01 PROCEDURE — 99499 UNLISTED E&M SERVICE: CPT | Mod: HCNC,,, | Performed by: INTERNAL MEDICINE

## 2019-05-01 PROCEDURE — 99999 PR PBB SHADOW E&M-EST. PATIENT-LVL III: ICD-10-PCS | Mod: PBBFAC,HCNC,, | Performed by: INTERNAL MEDICINE

## 2019-05-01 PROCEDURE — 80053 COMPREHEN METABOLIC PANEL: CPT | Mod: HCNC

## 2019-05-01 PROCEDURE — 99499 RISK ADDL DX/OHS AUDIT: ICD-10-PCS | Mod: HCNC,,, | Performed by: INTERNAL MEDICINE

## 2019-05-01 PROCEDURE — 85025 COMPLETE CBC W/AUTO DIFF WBC: CPT | Mod: HCNC

## 2019-05-01 RX ORDER — GABAPENTIN 300 MG/1
300 CAPSULE ORAL 3 TIMES DAILY
Refills: 0 | COMMUNITY
Start: 2019-04-12

## 2019-05-01 RX ORDER — TRAMADOL HYDROCHLORIDE 50 MG/1
50 TABLET ORAL
Qty: 30 TABLET | Refills: 0 | Status: SHIPPED | OUTPATIENT
Start: 2019-05-01

## 2019-05-01 NOTE — TELEPHONE ENCOUNTER
Called patients wife Mayda to inform her that his Tramadol prescription has been filled and sent to his preferred pharmacy. Mayda did not answer phone call. Voice message was left stating that his prescription has been sent in.
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
## 2019-05-01 NOTE — TELEPHONE ENCOUNTER
----- Message from Mayda Celeste sent at 5/1/2019  3:02 PM CDT -----  Contact: wife [phone]  Type: Patient Call Back    Who called: wife   Mayda     What is the request in detail:Pt was told a script for tramadol was going to be called in, they are at the pharmacy and nothing is there.     Best call back number: [phone]    Thanks.......Jeanie Turner call pt [phone]

## 2019-05-01 NOTE — TELEPHONE ENCOUNTER
Advised Pt wife that Dr. ALFONSO has not yet filled the prescription yet but will get to it. PT wife states understanding.

## 2019-05-08 DIAGNOSIS — M06.09 RHEUMATOID ARTHRITIS OF MULTIPLE SITES WITH NEGATIVE RHEUMATOID FACTOR: ICD-10-CM

## 2019-05-08 RX ORDER — FOLIC ACID 1 MG/1
TABLET ORAL
Qty: 30 TABLET | Refills: 0 | Status: SHIPPED | OUTPATIENT
Start: 2019-05-08

## 2019-05-29 ENCOUNTER — PATIENT MESSAGE (OUTPATIENT)
Dept: INTERNAL MEDICINE | Facility: CLINIC | Age: 79
End: 2019-05-29

## 2019-05-29 DIAGNOSIS — M06.09 RHEUMATOID ARTHRITIS OF MULTIPLE SITES WITH NEGATIVE RHEUMATOID FACTOR: ICD-10-CM

## 2019-05-29 RX ORDER — PREDNISONE 5 MG/1
5 TABLET ORAL DAILY
Qty: 90 TABLET | Refills: 1 | Status: SHIPPED | OUTPATIENT
Start: 2019-05-29

## 2019-05-29 NOTE — TELEPHONE ENCOUNTER
Patient Comment: Had a gout attack Sunday 05/26/19. Have taken 2 - 5mg twice a day for Sunday Monday & Tuesday. Out of prednisone now. Wanting to continue 2 - 1 a day through Friday, and start back on 1 a day Saturday if progress continues. Redness is almost gone, swelling is reduced and pain is getting better.

## 2019-06-27 DIAGNOSIS — M06.09 RHEUMATOID ARTHRITIS OF MULTIPLE SITES WITH NEGATIVE RHEUMATOID FACTOR: ICD-10-CM

## 2019-06-27 RX ORDER — METHOTREXATE 2.5 MG/1
TABLET ORAL
Qty: 30 TABLET | Refills: 1 | Status: SHIPPED | OUTPATIENT
Start: 2019-06-27

## 2019-07-29 ENCOUNTER — PES CALL (OUTPATIENT)
Dept: ADMINISTRATIVE | Facility: CLINIC | Age: 79
End: 2019-07-29

## 2019-10-18 ENCOUNTER — TELEPHONE (OUTPATIENT)
Dept: ADMINISTRATIVE | Facility: HOSPITAL | Age: 79
End: 2019-10-18

## 2019-10-18 NOTE — TELEPHONE ENCOUNTER
Attempted to contact patient to schedule annual visit with PCP Dr Daily Eller, voicemail left for patient to call back to schedule annual exam with PCP. PDageronimo

## 2020-03-06 DIAGNOSIS — N18.9 CHRONIC KIDNEY DISEASE, UNSPECIFIED CKD STAGE: ICD-10-CM

## 2020-10-06 ENCOUNTER — PATIENT MESSAGE (OUTPATIENT)
Dept: ADMINISTRATIVE | Facility: HOSPITAL | Age: 80
End: 2020-10-06

## 2020-12-08 NOTE — TELEPHONE ENCOUNTER
----- Message from Eleonora East sent at 7/14/2017  9:47 AM CDT -----  Contact: Alma Delia/wife  Patient bp is 207/102 and she needs to know what is going on with his bp medication, Please call her at [phone]. Patient thought he was getting two prescription on yesterday but instead he got one.    Thanks  td   (2) more than 100 beats/min

## 2021-03-25 ENCOUNTER — PATIENT MESSAGE (OUTPATIENT)
Dept: ADMINISTRATIVE | Facility: HOSPITAL | Age: 81
End: 2021-03-25

## 2023-03-16 NOTE — TELEPHONE ENCOUNTER
----- Message from Christiano Nieto MD sent at 7/14/2017  7:15 AM CDT -----  Electrolytes normal.  Kidney numbers improved on present medication.  BP not at goal.  Stop losartan 100 mg daily.  Change to similar but more potent valsartan 320 mg daily.  Follow-up on blood pressure in one month.   Laceration Repair